# Patient Record
Sex: FEMALE | Race: WHITE | NOT HISPANIC OR LATINO | Employment: FULL TIME | URBAN - METROPOLITAN AREA
[De-identification: names, ages, dates, MRNs, and addresses within clinical notes are randomized per-mention and may not be internally consistent; named-entity substitution may affect disease eponyms.]

---

## 2018-03-08 ENCOUNTER — TRANSFERRED RECORDS (OUTPATIENT)
Dept: HEALTH INFORMATION MANAGEMENT | Facility: CLINIC | Age: 43
End: 2018-03-08

## 2018-03-15 ENCOUNTER — TRANSFERRED RECORDS (OUTPATIENT)
Dept: HEALTH INFORMATION MANAGEMENT | Facility: CLINIC | Age: 43
End: 2018-03-15

## 2018-08-08 ENCOUNTER — HEALTH MAINTENANCE LETTER (OUTPATIENT)
Age: 43
End: 2018-08-08

## 2018-08-15 ENCOUNTER — PRE VISIT (OUTPATIENT)
Dept: NEUROLOGY | Facility: CLINIC | Age: 43
End: 2018-08-15

## 2018-08-15 ENCOUNTER — OFFICE VISIT (OUTPATIENT)
Dept: NEUROLOGY | Facility: CLINIC | Age: 43
End: 2018-08-15
Attending: PSYCHIATRY & NEUROLOGY
Payer: OTHER GOVERNMENT

## 2018-08-15 VITALS
HEIGHT: 68 IN | DIASTOLIC BLOOD PRESSURE: 83 MMHG | SYSTOLIC BLOOD PRESSURE: 126 MMHG | BODY MASS INDEX: 37.28 KG/M2 | WEIGHT: 246 LBS | HEART RATE: 59 BPM

## 2018-08-15 DIAGNOSIS — G93.32 CHRONIC FATIGUE DISORDER: Chronic | ICD-10-CM

## 2018-08-15 DIAGNOSIS — G35 MS (MULTIPLE SCLEROSIS) (H): Primary | ICD-10-CM

## 2018-08-15 PROCEDURE — G0463 HOSPITAL OUTPT CLINIC VISIT: HCPCS | Mod: ZF

## 2018-08-15 RX ORDER — AMANTADINE HYDROCHLORIDE 100 MG/1
100 CAPSULE, GELATIN COATED ORAL 2 TIMES DAILY
Qty: 90 CAPSULE | Refills: 3 | Status: SHIPPED | OUTPATIENT
Start: 2018-08-15 | End: 2018-11-14

## 2018-08-15 RX ORDER — GLATIRAMER 40 MG/ML
INJECTION, SOLUTION SUBCUTANEOUS
COMMUNITY
Start: 2018-04-02 | End: 2018-11-14

## 2018-08-15 RX ORDER — ERGOCALCIFEROL 1.25 MG/1
50000 CAPSULE ORAL WEEKLY
Qty: 30 CAPSULE | Refills: 3 | Status: SHIPPED | OUTPATIENT
Start: 2018-08-15 | End: 2019-09-18

## 2018-08-15 RX ORDER — DESOGESTREL AND ETHINYL ESTRADIOL 0.15-0.03
KIT ORAL
COMMUNITY
End: 2020-01-16

## 2018-08-15 ASSESSMENT — ENCOUNTER SYMPTOMS
HALLUCINATIONS: 0
BRUISES/BLEEDS EASILY: 0
SEIZURES: 0
SWOLLEN GLANDS: 0
BOWEL INCONTINENCE: 1
RECTAL PAIN: 0
WEIGHT LOSS: 0
HEADACHES: 1
INCREASED ENERGY: 1
DIZZINESS: 1
NAUSEA: 0
WEAKNESS: 1
VOMITING: 0
ALTERED TEMPERATURE REGULATION: 0
TINGLING: 1
DECREASED APPETITE: 0
WEIGHT GAIN: 0
LOSS OF CONSCIOUSNESS: 0
BLOOD IN STOOL: 0
POLYDIPSIA: 0
FATIGUE: 1
FEVER: 0
POLYPHAGIA: 0
DIARRHEA: 1
HEARTBURN: 0
CONSTIPATION: 1
NUMBNESS: 1
ABDOMINAL PAIN: 0
MEMORY LOSS: 1
PARALYSIS: 0
NIGHT SWEATS: 1
SPEECH CHANGE: 0
CHILLS: 0
DISTURBANCES IN COORDINATION: 1
JAUNDICE: 0
TREMORS: 0
BLOATING: 0

## 2018-08-15 NOTE — LETTER
Date:August 23, 2018      Patient was self referred, no letter generated. Do not send.        [facility] Health Information

## 2018-08-15 NOTE — MR AVS SNAPSHOT
After Visit Summary   8/15/2018    Chayo Nathan    MRN: [id number]           Patient Information     Date Of Birth          1975        Visit Information        Provider Department      8/15/2018 2:00 PM Maryann Hooks MD [facility] Multiple Sclerosis        Today's Diagnoses     Chronic fatigue disorder    -  1    MS (multiple sclerosis) (H)           Follow-ups after your visit        Follow-up notes from your care team     Return in about 3 months (around 11/15/2018).      Your next 10 appointments already scheduled     Nov 14, 2018 11:00 AM CST   (Arrive by 10:45 AM)   Return Multiple Sclerosis with Maryann Hooks MD   [facility] ([facility] [place])    [address] [place] [address]  [address] [place] [id number] [phone]              Future tests that were ordered for you today     Open Future Orders        Priority Expected Expires Ordered    MRI Cervical spine w & w/o contrast Routine  8/15/2019 8/15/2018    MRI Thoracic spine w & w/o contrast Routine  8/15/2019 8/15/2018    MR Brain w/o Contrast Routine  8/15/2019 8/15/2018            Who to contact     If you have questions or need follow up information about today's clinic visit or your schedule please contact [facility] MULTIPLE SCL[facility]OSIS directly at [phone].  Normal or non-critical lab and imaging results will be communicated to you by MyChart, letter or phone within 4 business days after the clinic has received the results. If you do not hear from us within 7 days, please contact the clinic through riskmethodshart or phone. If you have a critical or abnormal lab result, we will notify you by phone as soon as possible.  Submit refill requests through [employer] or call your pharmacy and they will forward the refill request to us. Please allow 3 business days for your refill to be completed.          Additional Information About Your Visit        [employer]shar[employer] Information     [employer] gives you secure access  "to your electronic health record. If you see a primary care provider, you can also send messages to your care team and make appointments. If you have questions, please call your primary care clinic.  If you do not have a primary care provider, please call [phone] and they will assist you.        Care EveryWhere ID     This is your Care EveryWhere ID. This could be used by other organizations to access your [place] medical records  [id number]        Your Vitals Were     Pulse Height BMI (Body Mass Index)             59 1.727 m (5' 8\") 37.4 kg/m2          Blood Pressure from Last 3 Encounters:   08/15/18 126/83    Weight from Last 3 Encounters:   08/15/18 111.6 kg (246 lb)                 Today's Medication Changes          These changes are accurate as of 8/15/18  3:42 PM.  If you have any questions, ask your nurse or doctor.               Start taking these medicines.        Dose/Directions    amantadine 100 MG capsule   Commonly known as:  SYMMETREL   Used for:  Chronic fatigue disorder, MS (multiple sclerosis) (H)   Started by:  Maryann Hooks MD        Dose:  100 mg   Take 1 capsule (100 mg) by mouth 2 times daily   Quantity:  90 capsule   Refills:  3       ergocalciferol 56225 units capsule   Commonly known as:  ERGOCALCIFEROL   Used for:  MS (multiple sclerosis) (H)   Started by:  Maryann Hooks MD        Dose:  33555 Units   Take 1 capsule (50,000 Units) by mouth once a week   Quantity:  30 capsule   Refills:  3            Where to get your medicines      These medications were sent to [facility] [address] [place] [id number]     Phone:  [phone]     amantadine 100 MG capsule    ergocalciferol 52549 units capsule                Primary Care Provider Fax #    Provider Not In System [phone]                Equal Access to Services     MATEO HUGO AH: Dennis Martin, jessenia ornelas, nicolas raymundo " nikolas workmantoma emanuelpeg guzmánaarashard ah. [facility] [phone].    ATENCIÓN: Si habla gerry, tiene a duke disposición servicios gratuitos de asistencia lingüística. Larissa al [phone].    We comply with applicable federal civil rights laws and [place] laws. We do not discriminate on the basis of race, color, national origin, age, disability, sex, sexual orientation, or gender identity.            Thank you!     Thank you for choosing [facility] MULTIPLE SCLEROSIS  for your care. Our goal is always to provide you with excellent care. Hearing back from our patients is one way we can continue to improve our services. Please take a few minutes to complete the written survey that you may receive in the mail after your visit with us. Thank you!             Your Updated Medication List - Protect others around you: Learn how to safely use, store and throw away your medicines at www.disposemymeds.org.          This list is accurate as of 8/15/18  3:42 PM.  Always use your most recent med list.                   Brand Name Dispense Instructions for use Diagnosis    amantadine 100 MG capsule    SYMMETREL    90 capsule    Take 1 capsule (100 mg) by mouth 2 times daily    Chronic fatigue disorder, MS (multiple sclerosis) (H)       COPAXONE 40 MG/ML Sosy   Generic drug:  Glatiramer Acetate           DESOGEN 0.15-30 MG-MCG per tablet   Generic drug:  desogestrel-ethinyl estradiol           ergocalciferol 31115 units capsule    ERGOCALCIFEROL    30 capsule    Take 1 capsule (50,000 Units) by mouth once a week    MS (multiple sclerosis) (H)

## 2018-08-15 NOTE — LETTER
8/15/2018       RE: Chayo Nathan  [address] [facility] [address]     Dear Colleague,    Thank you for referring your patient, Chayo Nathan, to the [facility]ULT[facility]LE SCL[facility]IS at [facility]. Please see a copy of my visit note below.    THE [facility]LE [facility]CL[facility] CLINIC  NEW PATIENT EVALUATION/CONSULTATION    Referral source:   Self              PRINCIPAL NEUROLOGIC DIAGNOSIS: Multiple Sclerosis    DISEASE SUMMARY  Date of onset: 2013  Date of diagnosis of MS: 2018  Disease course at onset: Relapsing Remitting  Current disease course: Relapsing Remitting  Previous disease therapies: none  Current disease therapy: Copaxone  Most recent MRI brain: 2-18  Most recent MRI cervical spine: 2-18  CSF: OCB +  JCV serology result and date:       HISTORY OF ILLNESS:    An opinion on this 43 year old right handed genetic female  was requested by Dr. Terrell Vasquez for management. The patient was accompanied by no one.       43 YEAR OLD FEMALE WITH NO SIGNIFICANt PMH EXCEPT chronic anemia likely from gastric bypass in 2007 and menometrorrhagia. She was  In her USOH until 2013 when she developed RLE numbness that lasted months and improved some without returning to her normal baseline, it would worsen during walking, she never seeked medical care. In 2014 she developed bilateral vision loss, she thought her contacts were causing an infection, there was significant light sensitivity. She went to the ER and was told it was inflammation that got better with steroid drops. 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     On February 14 she noticed a foot drop, numbness and balance issues, that had never happened before, she was at work in the hospital, by the end of the day it was worse. She saw her PCP who ordered an MRI lumbar and thoracic who she obtained on 2-16, when the results came back she was referred to   [facility] a neurologist who ordered the brain and cervical spine and a spinal tap which was positive for OCB. She underwent a 5 day course of IV solumedrol and saw improvement after 3 days but the side effects were terrible, she could not move, muscle pains, no HTN, insomnia or psychiatric symptoms. She started Copaxone end of March. She was under a lot of stress,     She reports that she does not have any foot weakness now but has balance issues that she thought were from the leg weakness. In march she had a prolonged spasm of the R arm (biceps) that responded to baclofen.    Current Symptoms:  1. Leg numbness bilaterally (toes)   2. Burning in the legs when she gets in water  3. Fatigue        PAST HISTORY:  No past medical history on file.    No past surgical history on file.           Current Outpatient Prescriptions:  Current Outpatient Prescriptions   Medication     Glatiramer Acetate (COPAXONE) 40 MG/ML SOSY     desogestrel-ethinyl estradiol (DESOGEN) 0.15-30 MG-MCG per tablet     No current facility-administered medications for this visit.           ALLERGIES       Allergies   Allergen Reactions     Bees Anaphylaxis     Penicillin G Rash     Sulfa Drugs Rash         Social History    Social History     Social History     Marital status:      Spouse name: N/A     Number of children: N/A     Years of education: N/A     Occupational History     Not on file.     Social History Main Topics     Smoking status: Never Smoker     Smokeless tobacco: Never Used     Alcohol use Not on file     Drug use: Not on file     Sexual activity: Not on file     Other Topics Concern     Not on file     Social History Narrative     No narrative on file         FAMILY HISTORY     No family history on file.      REVIEW OF SYSTEMS:    Comprehensive review of systems otherwise was negative, including constitutional, head and neck, cardiovascular, pulmonary, gastrointestinal, endocrine, urologic, reproductive, rheumatic, hematologic,  immunologic, dermatologic, and psychiatric.    Nutritional concerns: None  Driving issues: None   Safety concerns regarding living situations and safety at home: None  Risk of falls: None  Pain: None    PHYSICAL EXAM:    Hair, skin, nails, and joints were normal. Neck was supple without Lhermitte's phenomenon.  There was no percussion tenderness over the spine.     The patient was alert and oriented to person, place, and time with normal language, attention and concentration, recent and remote memory, praxis, and intellectual function. Affect was normal. The patient did not appear depressed.    Visual acuity:  OD 20/20 OS 20/20   Correction: without    Visual fields were full to confrontation.   Pupils were 3 mm and briskly reactive OU without a relative afferent pupillary defect.  Funduscopic examination was normal without disc edema, erythema, or atrophy.  Extraocular movements: Intact without HORTENCIA  Facial sensation is normal. Normal strength of the muscles of mastication:   Muscles of facial expression were normal  Hearing was normal. Gag reflex and palatal movements were normal. Sternocleidomastoid and trapezius power were normal. Tongue movements were normal. There was no dysarthria.    Motor Examination:   There was no pronator drift.       Motor    Upper      Right Left   Shoulder Abduction 5 5   Elbow Flexion 5 5   Elbow Extension 5 5   Wrist Extension 5 5   Digit Extension 5 5   Digit Flexion 5 5   APB 5 5   Tone 0 0   Lower       Right Left   Hip Flexion 5 5   Knee Extension 5 5   Knee Flexion 5 5   Foot Dorsiflexion 5 5   Foot Plantar Flexion 5 5   EH 5 5   Toe Flexion 5 5   Tone 0 0               Reflexes:     Reflexes       Right  Left   Biceps 1  1   Triceps 1  1   Brachioradialis 1  1   Patellar  1  1   Achilles 1  1   Babinski down  up         Coordination:     Right Left   RRM Normal Normal   LYUDMILA Normal Normal   FTN Normal Normal   RRM Normal mirror movemnts Normal   HKS Normal Normal         Sensory  examination:    Light touch:  Intact in all extremities      Coordination and Gait        Gait Normal   Right Left   Romberg Normal  Heel Normal Normal   Tandem Normal  Toe Normal Normal                   QUANTITATIVE SCORES:    Visual: 0-Normal  Brainstem: 0-Normal  Pyramidal: 1-Signs only  Cerebellar: 0-Normal  Sensory: 2-mild decrease in touch or pain or position sense or moderate decrease in vibration in one or two limbs; and/or mild vibration or figure-writing or temperature decrease alone in more than two limbs  Bladder/Bowel: 0-Normal  Cerebral: 0-Normal  Ambulatory: 0-Unrestricted    EDSS: 1.5- no disability, minimal signs in more than one FS (more than one FS grade 1)            REVIEW OF OUTSIDE RECORDS:  CSF 10 OCB and + increased IgG index    REVIEW OF IMAGING STUDIES:    I personally reviewed the following images:    MRI Brain 2-18 shows only 3-4 lesions atypical for MS in size, shape and location, not enhancing.  MRI cervical spine 2-18 multiple small lesions right, left and ventral, one of them (L) with partial enhancement.  MRI of the thoracic spine also shows multiple lesions, no enhancements     ASSESSMENT:    RRMS predominantly affecting the spinal cord, NMOSD can't be role out given the paucity of brain lesions and  Predominance of spinal cord lesons but smaller but I will review her records and make sure NMO antibody was ordered.    Her records show that her CSF is supportive of the fiagnosis of MS and not NMO     PLAN:    Continue CA for now. Repeat MRIs in November 8 months from starting Copaxone to confirm disease stability from the radiological stand point. She was advised to use [facility] magnet strength and facility for better comparison. She will be started on Amantadine for fatigue, 100 mg po BID    Finally I will follow the patient up in 3 month(s) as long as the patient is doing well. I instructed the patient to call or mychart my office with any concerns or questions.    I spent 75  minutes in this visit, with >50% direct patient time spent counseling about prognosis, treatment options, and coordination of care. She was counseled on the importance of diet, exercise, taking care of herself, decreasing stress and anxiety.    My recommendations will be communicated back to the patient's physician(s) by mail.  Follow-up is expected to be with me.      Maryann Hooks MD  Chief, Multiple Sclerosis Division  Department of Neurology  [facility]      (Chart documentation was completed in part with Dragon voice-recognition software. Even though reviewed, some grammatical, spelling, and word errors may remain.)     Answers for HPI/ROS submitted by the patient on 8/15/2018   General Symptoms: Yes  Skin Symptoms: No  HENT Symptoms: No  EYE SYMPTOMS: No  HEART SYMPTOMS: No  LUNG SYMPTOMS: No  INTESTINAL SYMPTOMS: Yes  URINARY SYMPTOMS: No  GYNECOLOGIC SYMPTOMS: No  BREAST SYMPTOMS: No  SKELETAL SYMPTOMS: No  BLOOD SYMPTOMS: Yes  NERVOUS SYSTEM SYMPTOMS: Yes  MENTAL HEALTH SYMPTOMS: No  Fever: No  Loss of appetite: No  Weight loss: No  Weight gain: No  Fatigue: Yes  Night sweats: Yes  Chills: No  Increased stress: Yes  Excessive hunger: No  Excessive thirst: No  Feeling hot or cold when others believe the temperature is normal: No  Loss of height: No  Post-operative complications: No  Surgical site pain: No  Hallucinations: No  Change in or Loss of Energy: Yes  Hyperactivity: No  Confusion: Yes  Heart burn or indigestion: No  Nausea: No  Vomiting: No  Abdominal pain: No  Bloating: No  Constipation: Yes  Diarrhea: Yes  Blood in stool: No  Black stools: No  Rectal or Anal pain: No  Fecal incontinence: Yes  Yellowing of skin or eyes: No  Vomit with blood: No  Change in stools: No  Anemia: Yes  Swollen glands: No  Easy bleeding or bruising: No  Edema or swelling: Yes  Trouble with coordination: Yes  Dizziness or trouble with balance: Yes  Fainting or black-out spells:  No  Memory loss: Yes  Headache: Yes  Seizures: No  Speech problems: No  Tingling: Yes  Tremor: No  Weakness: Yes  Difficulty walking: Yes  Paralysis: No  Numbness: Yes      Again, thank you for allowing me to participate in the care of your patient.      Sincerely,    Maryann Hooks MD

## 2018-08-15 NOTE — PROGRESS NOTES
THE [facility] MULTIPLE SCLEROSIS CLINIC  NEW PATIENT EVALUATION/CONSULTATION    Referral source:   Self              PRINCIPAL NEUROLOGIC DIAGNOSIS: Multiple Sclerosis    DISEASE SUMMARY  Date of onset: 2013  Date of diagnosis of MS: 2018  Disease course at onset: Relapsing Remitting  Current disease course: Relapsing Remitting  Previous disease therapies: none  Current disease therapy: Copaxone  Most recent MRI brain: 2-18  Most recent MRI cervical spine: 2-18  CSF: OCB +  JCV serology result and date:       HISTORY OF ILLNESS:    An opinion on this 43 year old right handed genetic female  was requested by Dr. Terrell Vasquez for management. The patient was accompanied by no one.       43 YEAR OLD FEMALE WITH NO SIGNIFICANt PMH EXCEPT chronic anemia likely from gastric bypass in 2007 and menometrorrhagia. She was  In her [employer] until 2013 when she developed RLE numbness that lasted months and improved some without returning to her normal baseline, it would worsen during walking, she never seeked medical care. In 2014 she developed bilateral vision loss, she thought her contacts were causing an infection, there was significant light sensitivity. She went to the ER and was told it was inflammation that got better with steroid drops. 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     On February 14 she noticed a foot drop, numbness and balance issues, that had never happened before, she was at work in the hospital, by the end of the day it was worse. She saw her PCP who ordered an MRI lumbar and thoracic who she obtained on 2-16, when the results came back she was referred to Dr.Tran Mensah a neurologist who ordered the brain and cervical spine and a spinal tap which was positive for OCB. She underwent a 5 day course of IV solumedrol and saw improvement after 3 days but the side effects were terrible, she could not move, muscle pains, no HTN, insomnia or psychiatric  symptoms. She started Copaxone end of March. She was under a lot of stress,     She reports that she does not have any foot weakness now but has balance issues that she thought were from the leg weakness. In march she had a prolonged spasm of the R arm (biceps) that responded to baclofen.    Current Symptoms:  1. Leg numbness bilaterally (toes)   2. Burning in the legs when she gets in water  3. Fatigue        PAST HISTORY:  No past medical history on file.    No past surgical history on file.           Current Outpatient Prescriptions:  Current Outpatient Prescriptions   Medication     Glatiramer Acetate (COPAXONE) 40 MG/ML SOSY     desogestrel-ethinyl estradiol (DESOGEN) 0.15-30 MG-MCG per tablet     No current facility-administered medications for this visit.           ALLERGIES       Allergies   Allergen Reactions     Bees Anaphylaxis     Penicillin G Rash     Sulfa Drugs Rash         Social History    Social History     Social History     Marital status:      Spouse name: N/A     Number of children: N/A     Years of education: N/A     Occupational History     Not on file.     Social History Main Topics     Smoking status: Never Smoker     Smokeless tobacco: Never Used     Alcohol use Not on file     Drug use: Not on file     Sexual activity: Not on file     Other Topics Concern     Not on file     Social History Narrative     No narrative on file         FAMILY HISTORY     No family history on file.      REVIEW OF SYSTEMS:    Comprehensive review of systems otherwise was negative, including constitutional, head and neck, cardiovascular, pulmonary, gastrointestinal, endocrine, urologic, reproductive, rheumatic, hematologic, immunologic, dermatologic, and psychiatric.    Nutritional concerns: None  Driving issues: None   Safety concerns regarding living situations and safety at home: None  Risk of falls: None  Pain: None    PHYSICAL EXAM:    Hair, skin, nails, and joints were normal. Neck was supple  without Lhermitte's phenomenon.  There was no percussion tenderness over the spine.     The patient was alert and oriented to person, place, and time with normal language, attention and concentration, recent and remote memory, praxis, and intellectual function. Affect was normal. The patient did not appear depressed.    Visual acuity:  OD 20/20 OS 20/20   Correction: without    Visual fields were full to confrontation.   Pupils were 3 mm and briskly reactive OU without a relative afferent pupillary defect.  Funduscopic examination was normal without disc edema, erythema, or atrophy.  Extraocular movements: Intact without HORTENCIA  Facial sensation is normal. Normal strength of the muscles of mastication:   Muscles of facial expression were normal  Hearing was normal. Gag reflex and palatal movements were normal. Sternocleidomastoid and trapezius power were normal. Tongue movements were normal. There was no dysarthria.    Motor Examination:   There was no pronator drift.       Motor    Upper      Right Left   Shoulder Abduction 5 5   Elbow Flexion 5 5   Elbow Extension 5 5   Wrist Extension 5 5   Digit Extension 5 5   Digit Flexion 5 5   APB 5 5   Tone 0 0   Lower       Right Left   Hip Flexion 5 5   Knee Extension 5 5   Knee Flexion 5 5   Foot Dorsiflexion 5 5   Foot Plantar Flexion 5 5   EH 5 5   Toe Flexion 5 5   Tone 0 0               Reflexes:     Reflexes       Right  Left   Biceps 1  1   Triceps 1  1   Brachioradialis 1  1   Patellar  1  1   Achilles 1  1   Babinski down  up         Coordination:     Right Left   RRM Normal Normal   LYUDMILA Normal Normal   FTN Normal Normal   RRM Normal mirror movemnts Normal   HKS Normal Normal         Sensory examination:    Light touch:  Intact in all extremities      Coordination and Gait        Gait Normal   Right Left   Romberg Normal  Heel Normal Normal   Tandem Normal  Toe Normal Normal                   QUANTITATIVE SCORES:    Visual: 0-Normal  Brainstem: 0-Normal  Pyramidal:  1-Signs only  Cerebellar: 0-Normal  Sensory: 2-mild decrease in touch or pain or position sense or moderate decrease in vibration in one or two limbs; and/or mild vibration or figure-writing or temperature decrease alone in more than two limbs  Bladder/Bowel: 0-Normal  Cerebral: 0-Normal  Ambulatory: 0-Unrestricted    EDSS: 1.5- no disability, minimal signs in more than one FS (more than one FS grade 1)            REVIEW OF OUTSIDE RECORDS:  CSF 10 OCB and + increased IgG index    REVIEW OF IMAGING STUDIES:    I personally reviewed the following images:    MRI Brain 2-18 shows only 3-4 lesions atypical for MS in size, shape and location, not enhancing.  MRI cervical spine 2-18 multiple small lesions right, left and ventral, one of them (L) with partial enhancement.  MRI of the thoracic spine also shows multiple lesions, no enhancements     ASSESSMENT:    RRMS predominantly affecting the spinal cord, NMOSD can't be role out given the paucity of brain lesions and  Predominance of spinal cord lesons but smaller but I will review her records and make sure NMO antibody was ordered.    Her records show that her CSF is supportive of the fiagnosis of MS and not NMO     PLAN:    Continue CA for now. Repeat MRIs in November 8 months from starting Copaxone to confirm disease stability from the radiological stand point. She was advised to use [facility] magnet strength and facility for better comparison. She will be started on Amantadine for fatigue, 100 mg po BID    Finally I will follow the patient up in 3 month(s) as long as the patient is doing well. I instructed the patient to call or mychart my office with any concerns or questions.    I spent 75 minutes in this visit, with >50% direct patient time spent counseling about prognosis, treatment options, and coordination of care. She was counseled on the importance of diet, exercise, taking care of herself, decreasing stress and anxiety.    My recommendations will be communicated  back to the patient's physician(s) by mail.  Follow-up is expected to be with me.      Maryann Hooks MD  Chief, Multiple Sclerosis Division  Department of Neurology  [facility]      (Chart documentation was completed in part with Dragon voice-recognition software. Even though reviewed, some grammatical, spelling, and word errors may remain.)     Answers for HPI/ROS submitted by the patient on 8/15/2018   General Symptoms: Yes  Skin Symptoms: No  HENT Symptoms: No  EYE SYMPTOMS: No  HEART SYMPTOMS: No  LUNG SYMPTOMS: No  INTESTINAL SYMPTOMS: Yes  URINARY SYMPTOMS: No  GYNECOLOGIC SYMPTOMS: No  BREAST SYMPTOMS: No  SKELETAL SYMPTOMS: No  BLOOD SYMPTOMS: Yes  NERVOUS SYSTEM SYMPTOMS: Yes  MENTAL HEALTH SYMPTOMS: No  Fever: No  Loss of appetite: No  Weight loss: No  Weight gain: No  Fatigue: Yes  Night sweats: Yes  Chills: No  Increased stress: Yes  Excessive hunger: No  Excessive thirst: No  Feeling hot or cold when others believe the temperature is normal: No  Loss of height: No  Post-operative complications: No  Surgical site pain: No  Hallucinations: No  Change in or Loss of Energy: Yes  Hyperactivity: No  Confusion: Yes  Heart burn or indigestion: No  Nausea: No  Vomiting: No  Abdominal pain: No  Bloating: No  Constipation: Yes  Diarrhea: Yes  Blood in stool: No  Black stools: No  Rectal or Anal pain: No  Fecal incontinence: Yes  Yellowing of skin or eyes: No  Vomit with blood: No  Change in stools: No  Anemia: Yes  Swollen glands: No  Easy bleeding or bruising: No  Edema or swelling: Yes  Trouble with coordination: Yes  Dizziness or trouble with balance: Yes  Fainting or black-out spells: No  Memory loss: Yes  Headache: Yes  Seizures: No  Speech problems: No  Tingling: Yes  Tremor: No  Weakness: Yes  Difficulty walking: Yes  Paralysis: No  Numbness: Yes

## 2018-10-19 ENCOUNTER — TRANSFERRED RECORDS (OUTPATIENT)
Dept: HEALTH INFORMATION MANAGEMENT | Facility: CLINIC | Age: 43
End: 2018-10-19

## 2018-11-14 ENCOUNTER — OFFICE VISIT (OUTPATIENT)
Dept: NEUROLOGY | Facility: CLINIC | Age: 43
End: 2018-11-14
Attending: PSYCHIATRY & NEUROLOGY
Payer: OTHER GOVERNMENT

## 2018-11-14 VITALS
HEIGHT: 68 IN | HEART RATE: 60 BPM | DIASTOLIC BLOOD PRESSURE: 79 MMHG | WEIGHT: 239.1 LBS | SYSTOLIC BLOOD PRESSURE: 118 MMHG | BODY MASS INDEX: 36.24 KG/M2

## 2018-11-14 DIAGNOSIS — G35 MULTIPLE SCLEROSIS (H): Primary | ICD-10-CM

## 2018-11-14 DIAGNOSIS — G35 MS (MULTIPLE SCLEROSIS) (H): ICD-10-CM

## 2018-11-14 DIAGNOSIS — G93.32 CHRONIC FATIGUE DISORDER: Chronic | ICD-10-CM

## 2018-11-14 PROCEDURE — G0463 HOSPITAL OUTPT CLINIC VISIT: HCPCS | Mod: ZF

## 2018-11-14 RX ORDER — GLATIRAMER 40 MG/ML
40 INJECTION, SOLUTION SUBCUTANEOUS
Qty: 12 SYRINGE | Refills: 11 | Status: SHIPPED | OUTPATIENT
Start: 2018-11-14 | End: 2019-10-04

## 2018-11-14 RX ORDER — AMANTADINE HYDROCHLORIDE 100 MG/1
200 CAPSULE, GELATIN COATED ORAL 2 TIMES DAILY
Qty: 120 CAPSULE | Refills: 3 | Status: SHIPPED | OUTPATIENT
Start: 2018-11-14 | End: 2020-01-16

## 2018-11-14 ASSESSMENT — PAIN SCALES - GENERAL: PAINLEVEL: NO PAIN (0)

## 2018-11-14 NOTE — LETTER
11/14/2018       RE: Chayo Nathan  [address] [place] [address]     Dear Colleague,    Thank you for referring your patient, Chayo Nathan, to the [facility] MULTIPLE SCLEROSIS at [facility]. Please see a copy of my visit note below.    THE [facility] MULTIPLE SCLEROSIS CLINIC  FOLLOW UP VISIT           PRINCIPAL NEUROLOGIC DIAGNOSIS: Multiple Sclerosis          HISTORY OF ILLNESS:    This is a follow visit for this 43 year old right handed genetic male  With a history of MS. Who was last seen on  8-18.   At that time the patient was recommended to start amantadine which she did and feel like it helps but she is still tired so I recommended increasing the dose to 200 mg BID. Since last visit she underwent an MRI of the brain, cervical and thoracic. She complains of her legs getting tired easily but can walk 1/2 mile every morning with her new puppy and does not feel tired, or feels like like her legs get tired.    Current Symptoms:  1. Tingling in her feet.  2. Bilateral leg weakness on exertion  3.              Current Outpatient Prescriptions:  Current Outpatient Prescriptions   Medication     amantadine (SYMMETREL) 100 MG capsule     desogestrel-ethinyl estradiol (DESOGEN) 0.15-30 MG-MCG per tablet     ergocalciferol (ERGOCALCIFEROL) 82238 units capsule     Glatiramer Acetate (COPAXONE) 40 MG/ML SOSY     No current facility-administered medications for this visit.           ALLERGIES       Allergies   Allergen Reactions     Bees Anaphylaxis     Ferumoxytol Difficulty breathing     Penicillin G Rash     Sulfa Drugs Rash           REVIEW OF SYSTEMS:    Comprehensive review of systems otherwise was negative, including constitutional, head and neck, cardiovascular, pulmonary, gastrointestinal, endocrine, urologic, reproductive, rheumatic, hematologic, immunologic, dermatologic, and psychiatric.    Nutritional concerns: None  Driving issues: None    Safety concerns regarding living situations and safety at home: None  Risk of falls: None  Pain: None    PHYSICAL EXAM:    PHYSICAL EXAM:     Hair, skin, nails, and joints were normal. Neck was supple without Lhermitte's phenomenon.  There was no percussion tenderness over the spine.      The patient was alert and oriented to person, place, and time with normal language, attention and concentration, recent and remote memory, praxis, and intellectual function. Affect was normal. The patient did not appear depressed.     Visual acuity:  OD 20/20 OS 20/20   Correction: without     Visual fields were full to confrontation.   Pupils were 3 mm and briskly reactive OU without a relative afferent pupillary defect.  Funduscopic examination was normal without disc edema, erythema, or atrophy.  Extraocular movements: Intact without HORTENCIA  Facial sensation is normal. Normal strength of the muscles of mastication:   Muscles of facial expression were normal  Hearing was normal. Gag reflex and palatal movements were normal. Sternocleidomastoid and trapezius power were normal. Tongue movements were normal. There was no dysarthria.     Motor Examination:   There was no pronator drift.         Motor     Upper         Right Left   Shoulder Abduction 5 5   Elbow Flexion 5 5   Elbow Extension 5 5   Wrist Extension 5 5   Digit Extension 5 5   Digit Flexion 5 5   APB 5 5   Tone 0 0   Lower          Right Left   Hip Flexion 5 5   Knee Extension 5 5   Knee Flexion 5 5   Foot Dorsiflexion 5 5   Foot Plantar Flexion 5 5   EH 5 5   Toe Flexion 5 5   Tone 0 0                    Reflexes:                Reflexes           Right   Left   Biceps 1   1   Triceps 1   1   Brachioradialis 1   1   Patellar  1   1   Achilles 1   1   Babinski down   up            Coordination:       Right Left   RRM Normal Normal   LYUDMILA Normal Normal   FTN Normal Normal   RRM Normal mirror movemnts Normal   HKS Normal Normal            Sensory examination:     Light  touch:  Intact in all extremities        Coordination and Gait           Gait Normal     Right Left   Romberg Normal   Heel Normal Normal   Tandem Normal   Toe Normal Normal                           QUANTITATIVE SCORES:     Visual: 0-Normal  Brainstem: 0-Normal  Pyramidal: 1-Signs only  Cerebellar: 0-Normal  Sensory: 2-mild decrease in touch or pain or position sense or moderate decrease in vibration in one or two limbs; and/or mild vibration or figure-writing or temperature decrease alone in more than two limbs  Bladder/Bowel: 0-Normal  Cerebral: 0-Normal  Ambulatory: 0-Unrestricted     EDSS: 1.5- no disability, minimal signs in more than one FS (more than one FS grade 1)          REVIEW OF OUTSIDE RECORDS:    NMO antibody is negative, complete work up for MS mimics is negative, vitamin levels and zinc are normal, CSF OCB are positive (2).    REVIEW OF IMAGING STUDIES:    I personally reviewed the following images:    MRI of the brain from 10-18 shows 8-10 lesions some typical of MS (PV) some not, no enhancements, unchanged from last MRI from 2-18,    MRI of the C and T spine also show no interval change.    ASSESSMENT:    RRMS clinically and radiologically stable on Copaxone. Fatigue and issue.    PLAN:    Continue Copaxone indefinitely, repeat MRI B, C and T spine in 6 [facility].  Increase Amantadine to 200 mg po BID before breakfast and lunch.  Continue exercise program.    Finally I will follow the patient up in 6 month(s) as long as the patient is doing well. I instructed the patient to call or mychart my office with any concerns or questions.    I spent 45 minutes in this visit, with >50% direct patient time spent counseling about prognosis, treatment options, and coordination of care.     My recommendations will be communicated back to the patient's physician(s) by mail.  Follow-up is expected to be with me.      Maryann Hooks MD  Chief, Multiple Sclerosis Division  Department of  Neurology  [facility]      (Chart documentation was completed in part with Dragon voice-recognition software. Even though reviewed, some grammatical, spelling, and word errors may remain.)

## 2018-11-14 NOTE — PROGRESS NOTES
THE [facility] MULTIPLE SCLEROSIS CLINIC  FOLLOW UP VISIT           PRINCIPAL NEUROLOGIC DIAGNOSIS: Multiple Sclerosis          HISTORY OF ILLNESS:    This is a follow visit for this 43 year old right handed genetic male  With a history of MS. Who was last seen on  8-18.   At that time the patient was recommended to start amantadine which she did and feel like it helps but she is still tired so I recommended increasing the dose to 200 mg BID. Since last visit she underwent an MRI of the brain, cervical and thoracic. She complains of her legs getting tired easily but can walk 1/2 mile every morning with her new puppy and does not feel tired, or feels like like her legs get tired.    Current Symptoms:  1. Tingling in her feet.  2. Bilateral leg weakness on exertion  3.              Current Outpatient Prescriptions:  Current Outpatient Prescriptions   Medication     amantadine (SYMMETREL) 100 MG capsule     desogestrel-ethinyl estradiol (DESOGEN) 0.15-30 MG-MCG per tablet     ergocalciferol (ERGOCALCIFEROL) 45036 units capsule     Glatiramer Acetate (COPAXONE) 40 MG/ML SOSY     No current facility-administered medications for this visit.           ALLERGIES       Allergies   Allergen Reactions     Bees Anaphylaxis     Ferumoxytol Difficulty breathing     Penicillin G Rash     Sulfa Drugs Rash           REVIEW OF SYSTEMS:    Comprehensive review of systems otherwise was negative, including constitutional, head and neck, cardiovascular, pulmonary, gastrointestinal, endocrine, urologic, reproductive, rheumatic, hematologic, immunologic, dermatologic, and psychiatric.    Nutritional concerns: None  Driving issues: None   Safety concerns regarding living situations and safety at home: None  Risk of falls: None  Pain: None    PHYSICAL EXAM:    PHYSICAL EXAM:     Hair, skin, nails, and joints were normal. Neck was supple without Lhermitte's phenomenon.  There was no percussion tenderness over the spine.       The patient was alert and oriented to person, place, and time with normal language, attention and concentration, recent and remote memory, praxis, and intellectual function. Affect was normal. The patient did not appear depressed.     Visual acuity:  OD 20/20 OS 20/20   Correction: without     Visual fields were full to confrontation.   Pupils were 3 mm and briskly reactive OU without a relative afferent pupillary defect.  Funduscopic examination was normal without disc edema, erythema, or atrophy.  Extraocular movements: Intact without HORTENCIA  Facial sensation is normal. Normal strength of the muscles of mastication:   Muscles of facial expression were normal  Hearing was normal. Gag reflex and palatal movements were normal. Sternocleidomastoid and trapezius power were normal. Tongue movements were normal. There was no dysarthria.     Motor Examination:   There was no pronator drift.         Motor     Upper         Right Left   Shoulder Abduction 5 5   Elbow Flexion 5 5   Elbow Extension 5 5   Wrist Extension 5 5   Digit Extension 5 5   Digit Flexion 5 5   APB 5 5   Tone 0 0   Lower          Right Left   Hip Flexion 5 5   Knee Extension 5 5   Knee Flexion 5 5   Foot Dorsiflexion 5 5   Foot Plantar Flexion 5 5   EH 5 5   Toe Flexion 5 5   Tone 0 0                    Reflexes:                Reflexes           Right   Left   Biceps 1   1   Triceps 1   1   Brachioradialis 1   1   Patellar  1   1   Achilles 1   1   Babinski down   up            Coordination:       Right Left   RRM Normal Normal   LYUDMILA Normal Normal   FTN Normal Normal   RRM Normal mirror movemnts Normal   HKS Normal Normal            Sensory examination:     Light touch:  Intact in all extremities        Coordination and Gait           Gait Normal     Right Left   Romberg Normal   Heel Normal Normal   Tandem Normal   Toe Normal Normal                           QUANTITATIVE SCORES:     Visual: 0-Normal  Brainstem: 0-Normal  Pyramidal: 1-Signs  only  Cerebellar: 0-Normal  Sensory: 2-mild decrease in touch or pain or position sense or moderate decrease in vibration in one or two limbs; and/or mild vibration or figure-writing or temperature decrease alone in more than two limbs  Bladder/Bowel: 0-Normal  Cerebral: 0-Normal  Ambulatory: 0-Unrestricted     EDSS: 1.5- no disability, minimal signs in more than one FS (more than one FS grade 1)          REVIEW OF OUTSIDE RECORDS:    NMO antibody is negative, complete work up for MS mimics is negative, vitamin levels and zinc are normal, CSF OCB are positive (2).    REVIEW OF IMAGING STUDIES:    I personally reviewed the following images:    MRI of the brain from 10-18 shows 8-10 lesions some typical of MS (PV) some not, no enhancements, unchanged from last MRI from 2-18,    MRI of the C and T spine also show no interval change.    ASSESSMENT:    RRMS clinically and radiologically stable on Copaxone. Fatigue and issue.    PLAN:    Continue Copaxone indefinitely, repeat MRI B, C and T spine in 6 months at the [facility].  Increase Amantadine to 200 mg po BID before breakfast and lunch.  Continue exercise program.    Finally I will follow the patient up in 6 month(s) as long as the patient is doing well. I instructed the patient to call or mychart my office with any concerns or questions.    I spent 45 minutes in this visit, with >50% direct patient time spent counseling about prognosis, treatment options, and coordination of care.     My recommendations will be communicated back to the patient's physician(s) by mail.  Follow-up is expected to be with me.      Maryann Hooks MD  Chief, Multiple Sclerosis Division  Department of Neurology  [facility]      (Chart documentation was completed in part with Dragon voice-recognition software. Even though reviewed, some grammatical, spelling, and word errors may remain.)

## 2019-05-08 ENCOUNTER — ANCILLARY PROCEDURE (OUTPATIENT)
Dept: MRI IMAGING | Facility: CLINIC | Age: 44
End: 2019-05-08
Attending: PSYCHIATRY & NEUROLOGY
Payer: OTHER GOVERNMENT

## 2019-05-08 DIAGNOSIS — G35 MS (MULTIPLE SCLEROSIS) (H): ICD-10-CM

## 2019-05-08 RX ORDER — GADOBUTROL 604.72 MG/ML
10 INJECTION INTRAVENOUS ONCE
Status: COMPLETED | OUTPATIENT
Start: 2019-05-08 | End: 2019-05-08

## 2019-05-08 RX ADMIN — GADOBUTROL 10 ML: 604.72 INJECTION INTRAVENOUS at 10:54

## 2019-05-08 NOTE — DISCHARGE INSTRUCTIONS
MRI Contrast Discharge Instructions    The IV contrast you received today will pass out of your body in your  urine. This will happen in the next 24 hours. You will not feel this process.  Your urine will not change color.    Drink at least 4 extra glasses of water or juice today (unless your doctor  has restricted your fluids). This reduces the stress on your kidneys.  You may take your regular medicines.    If you are on dialysis: It is best to have dialysis today.    If you have a reaction: Most reactions happen right away. If you have  any new symptoms after leaving the hospital (such as hives or swelling),  call your hospital at the correct number below. Or call your family doctor.  If you have breathing distress or wheezing, call 911.    Special instructions: ***    I have read and understand the above information.    Signature:______________________________________ Date:___________    Staff:__________________________________________ Date:___________     Time:__________    [place] [facility] Departments:    ___[place]: [phone]  ___[facility]: [phone]  ___[place]: [phone] ___[facility]: [phone]  ___[facility]: [phone]  ___[facility]: [phone]  ___[phone]  ___[facility]: [phone]  ___[phone]

## 2019-05-08 NOTE — DISCHARGE INSTRUCTIONS
MRI Contrast Discharge Instructions    The IV contrast you received today will pass out of your body in your  urine. This will happen in the next 24 hours. You will not feel this process.  Your urine will not change color.    Drink at least 4 extra glasses of water or juice today (unless your doctor  has restricted your fluids). This reduces the stress on your kidneys.  You may take your regular medicines.    If you are on dialysis: It is best to have dialysis today.    If you have a reaction: Most reactions happen right away. If you have  any new symptoms after leaving the hospital (such as hives or swelling),  call your hospital at the correct number below. Or call your family doctor.  If you have breathing distress or wheezing, call 911.    Special instructions: ***    I have read and understand the above information.    Signature:______________________________________ Date:___________    Staff:__________________________________________ Date:___________     Time:__________    [facility] Departments:    ___[place]: [phone]  ___[facility]: [phone]  ___[place]: [phone] ___[facility]: [phone]  ___[facility]: [phone]  ___[facility]: [phone]  ___[phone]  ___[facility]: [phone]  ___[phone]

## 2019-05-15 ENCOUNTER — OFFICE VISIT (OUTPATIENT)
Dept: NEUROLOGY | Facility: CLINIC | Age: 44
End: 2019-05-15
Attending: PSYCHIATRY & NEUROLOGY
Payer: OTHER GOVERNMENT

## 2019-05-15 VITALS
DIASTOLIC BLOOD PRESSURE: 80 MMHG | BODY MASS INDEX: 38.07 KG/M2 | HEART RATE: 80 BPM | HEIGHT: 68 IN | WEIGHT: 251.2 LBS | SYSTOLIC BLOOD PRESSURE: 125 MMHG

## 2019-05-15 DIAGNOSIS — G35 MULTIPLE SCLEROSIS (H): Primary | ICD-10-CM

## 2019-05-15 PROCEDURE — G0463 HOSPITAL OUTPT CLINIC VISIT: HCPCS | Mod: ZF

## 2019-05-15 ASSESSMENT — MIFFLIN-ST. JEOR: SCORE: 1842.94

## 2019-05-15 ASSESSMENT — PAIN SCALES - GENERAL: PAINLEVEL: NO PAIN (0)

## 2019-05-15 NOTE — LETTER
"5/15/2019       RE: Chayo Nathan  [address] [facility] [address]     Dear Colleague,    Thank you for referring your patient, Chayo Nathan, to the [facility] MULTIPLE SCLEROSIS at [facility]. Please see a copy of my visit note below.    THE [facility] MULTIPLE SCLEROSIS CLINIC  FOLLOW UP VISIT       PRINCIPAL NEUROLOGIC DIAGNOSIS: Multiple Sclerosis      HISTORY OF ILLNESS:    This is a follow visit for this 44 year old right handed genetic female  With a history of MS. Who was last seen on  11-18.   At that time the patient was recommended to continue Copaxone. Since last visit she developed R flank numbness a few weeks ago but 2 weeks ago it seemed more intense and extensive. About a week ago she started getting \"zaps\" from the foot to the mild calf and then her foot feels week, this happened twice during a 30 min walk with her dogs, during the last 15 min, the foot feels heavier but she does not drag. 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 Numbness in her R calf and foot has been present for some time and stable but the weakness is new. Her L foot is also always numb but she thinks is worse. R hand weakness mostly tip of the fingers x 2 weeks also new. She started 0.5 ml BID of CBD oil from [employer] for spasticity and she thinks it helps. She feels stiff one she has been sitting for a while, mostly her legs, this has been going on for a while, she is now back on baclofen.    Current Symptoms:  1. R foot weaknss  2. R finger numbness  3. Spasticity      Current Outpatient Prescriptions:  Current Outpatient Medications   Medication     amantadine (SYMMETREL) 100 MG capsule     desogestrel-ethinyl estradiol (DESOGEN) 0.15-30 MG-MCG per tablet     Glatiramer Acetate (COPAXONE) 40 MG/ML SOSY     ergocalciferol (ERGOCALCIFEROL) 35536 units capsule     No current " facility-administered medications for this visit.        ALLERGIES    Allergies   Allergen Reactions     Bees Anaphylaxis     Ferumoxytol Difficulty breathing     Penicillin G Rash     Sulfa Drugs Rash       REVIEW OF SYSTEMS:    Comprehensive review of systems otherwise was negative, including constitutional, head and neck, cardiovascular, pulmonary, gastrointestinal, endocrine, urologic, reproductive, rheumatic, hematologic, immunologic, dermatologic, and psychiatric.    Nutritional concerns: None  Driving issues: None   Safety concerns regarding living situations and safety at home: None  Risk of falls: None  Pain: None    PHYSICAL EXAM:     Hair, skin, nails, and joints were normal. Neck was supple without Lhermitte's phenomenon.  There was no percussion tenderness over the spine.      The patient was alert and oriented to person, place, and time with normal language, attention and concentration, recent and remote memory, praxis, and intellectual function. Affect was normal. The patient did not appear depressed.     Visual acuity:  OD 20/20 OS 20/20   Correction: without     Visual fields were full to confrontation.   Pupils were 3 mm and briskly reactive OU without a relative afferent pupillary defect.  Funduscopic examination was normal without disc edema, erythema, or atrophy.  Extraocular movements: Intact without HORTENCIA  Facial sensation is normal. Normal strength of the muscles of mastication:   Muscles of facial expression were normal  Hearing was normal. Gag reflex and palatal movements were normal. Sternocleidomastoid and trapezius power were normal. Tongue movements were normal. There was no dysarthria.     Motor Examination:   There was no pronator drift.         Motor     Upper         Right Left   Shoulder Abduction 5 5   Elbow Flexion 5 5   Elbow Extension 5 5   Wrist Extension 5 5   Digit Extension 5 5   Digit Flexion 5 5   APB 5 5   Tone 0 0   Lower          Right Left   Hip Flexion 5 5   Knee  Extension 5 5   Knee Flexion 5 5   Foot Dorsiflexion 5 5   Foot Plantar Flexion 5 5   EH 5 5   Toe Flexion 5 5   Tone 0 0             Reflexes:                Reflexes           Right   Left   Biceps 1   1   Triceps 1   1   Brachioradialis 1   1   Patellar  1   1   Achilles 1   1   Babinski down   up            Coordination:       Right Left   RRM Normal Normal   LYUDMILA Normal Normal   FTN Normal Normal   RRM Normal mirror movemnts Normal   HKS Normal Normal         Sensory examination:     Light touch:  Intact in all extremities        Coordination and Gait     Gait Normal     Right Left   Romberg Normal   Heel Normal Normal   Tandem Normal   Toe Normal Normal         QUANTITATIVE SCORES:     Visual: 0-Normal  Brainstem: 0-Normal  Pyramidal: 1-Signs only  Cerebellar: 0-Normal  Sensory: 2-mild decrease in touch or pain or position sense or moderate decrease in vibration in one or two limbs; and/or mild vibration or figure-writing or temperature decrease alone in more than two limbs  Bladder/Bowel: 0-Normal  Cerebral: 0-Normal  Ambulatory: 0-Unrestricted     EDSS: 1.5- no disability, minimal signs in more than one FS (more than one FS grade 1)    REVIEW OF IMAGING STUDIES:    I personally reviewed the following images:    MRI B, C and T spine from 5-8-19 were compared to 10-18 and show no interval change in size or number of lesions nor enhacements    ASSESSMENT:    RRMS clinically and radiologically stable on Copaxone.      PLAN:     Continue Copaxone indefinitely, start Ampyra or 4 AP to improve ambulation capacity and decrease exertional R foot drop  Continue exercise program.    Finally I will follow the patient up in 6 month(s) as long as the patient is doing well. I instructed the patient to call or mychart my office with any concerns or questions.  I spent 45 minutes in this visit, with >50% direct patient time spent reviewing and comparing images, counseling about prognosis, treatment options, and coordination  of care.     My recommendations will be communicated back to the patient's physician(s) by mail.  Follow-up is expected to be with me.    Maryann Hooks MD  Chief, Multiple Sclerosis Division  Department of Neurology  [facility] [place]    (Chart documentation was completed in part with Dragon voice-recognition software. Even though reviewed, some grammatical, spelling, and word errors may remain.)

## 2019-05-15 NOTE — PROGRESS NOTES
"THE [facility] MULTIPLE SCLEROSIS CLINIC  FOLLOW UP VISIT           PRINCIPAL NEUROLOGIC DIAGNOSIS: Multiple Sclerosis        HISTORY OF ILLNESS:    This is a follow visit for this 44 year old right handed genetic female  With a history of MS. Who was last seen on  11-18.   At that time the patient was recommended to continue Copaxone. Since last visit she developed R flank numbness a few weeks ago but 2 weeks ago it seemed more intense and extensive. About a week ago she started getting \"zaps\" from the foot to the mild calf and then her foot feels week, this happened twice during a 30 min walk with her dogs, during the last 15 min, the foot feels heavier but she does not drag. 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 Numbness in her R calf and foot has been present for some time and stable but the weakness is new. Her L foot is also always numb but she thinks is worse. R hand weakness mostly tip of the fingers x 2 weeks also new. She started 0.5 ml BID of CBD oil from [employer] for spasticity and she thinks it helps. She feels stiff one she has been sitting for a while, mostly her legs, this has been going on for a while, she is now back on baclofen.    Current Symptoms:  1. R foot weaknss  2. R finger numbness  3. Spasticity      Current Outpatient Prescriptions:  Current Outpatient Medications   Medication     amantadine (SYMMETREL) 100 MG capsule     desogestrel-ethinyl estradiol (DESOGEN) 0.15-30 MG-MCG per tablet     Glatiramer Acetate (COPAXONE) 40 MG/ML SOSY     ergocalciferol (ERGOCALCIFEROL) 82935 units capsule     No current facility-administered medications for this visit.           ALLERGIES       Allergies   Allergen Reactions     Bees Anaphylaxis     Ferumoxytol Difficulty breathing     Penicillin G Rash     Sulfa Drugs Rash           REVIEW OF SYSTEMS:    Comprehensive review of systems otherwise was " negative, including constitutional, head and neck, cardiovascular, pulmonary, gastrointestinal, endocrine, urologic, reproductive, rheumatic, hematologic, immunologic, dermatologic, and psychiatric.    Nutritional concerns: None  Driving issues: None   Safety concerns regarding living situations and safety at home: None  Risk of falls: None  Pain: None    PHYSICAL EXAM:     Hair, skin, nails, and joints were normal. Neck was supple without Lhermitte's phenomenon.  There was no percussion tenderness over the spine.      The patient was alert and oriented to person, place, and time with normal language, attention and concentration, recent and remote memory, praxis, and intellectual function. Affect was normal. The patient did not appear depressed.     Visual acuity:  OD 20/20 OS 20/20   Correction: without     Visual fields were full to confrontation.   Pupils were 3 mm and briskly reactive OU without a relative afferent pupillary defect.  Funduscopic examination was normal without disc edema, erythema, or atrophy.  Extraocular movements: Intact without HORTENCIA  Facial sensation is normal. Normal strength of the muscles of mastication:   Muscles of facial expression were normal  Hearing was normal. Gag reflex and palatal movements were normal. Sternocleidomastoid and trapezius power were normal. Tongue movements were normal. There was no dysarthria.     Motor Examination:   There was no pronator drift.         Motor     Upper         Right Left   Shoulder Abduction 5 5   Elbow Flexion 5 5   Elbow Extension 5 5   Wrist Extension 5 5   Digit Extension 5 5   Digit Flexion 5 5   APB 5 5   Tone 0 0   Lower          Right Left   Hip Flexion 5 5   Knee Extension 5 5   Knee Flexion 5 5   Foot Dorsiflexion 5 5   Foot Plantar Flexion 5 5   EH 5 5   Toe Flexion 5 5   Tone 0 0             Reflexes:                Reflexes           Right   Left   Biceps 1   1   Triceps 1   1   Brachioradialis 1   1   Patellar  1   1   Achilles 1   1    Babinski down   up            Coordination:       Right Left   RRM Normal Normal   LYUDMILA Normal Normal   FTN Normal Normal   RRM Normal mirror movemnts Normal   HKS Normal Normal            Sensory examination:     Light touch:  Intact in all extremities        Coordination and Gait           Gait Normal     Right Left   Romberg Normal   Heel Normal Normal   Tandem Normal   Toe Normal Normal            QUANTITATIVE SCORES:     Visual: 0-Normal  Brainstem: 0-Normal  Pyramidal: 1-Signs only  Cerebellar: 0-Normal  Sensory: 2-mild decrease in touch or pain or position sense or moderate decrease in vibration in one or two limbs; and/or mild vibration or figure-writing or temperature decrease alone in more than two limbs  Bladder/Bowel: 0-Normal  Cerebral: 0-Normal  Ambulatory: 0-Unrestricted     EDSS: 1.5- no disability, minimal signs in more than one FS (more than one FS grade 1)          REVIEW OF IMAGING STUDIES:    I personally reviewed the following images:    MRI B, C and T spine from 5-8-19 were compared to 10-18 and show no interval change in size or number of lesions nor enhacements    ASSESSMENT:       RRMS clinically and radiologically stable on Copaxone.      PLAN:     Continue Copaxone indefinitely, start Ampyra or 4 AP to improve ambulation capacity and decrease exertional R foot drop  Continue exercise program.      Finally I will follow the patient up in 6 month(s) as long as the patient is doing well. I instructed the patient to call or mychart my office with any concerns or questions.    I spent 45 minutes in this visit, with >50% direct patient time spent reviewing and comparing images, counseling about prognosis, treatment options, and coordination of care.     My recommendations will be communicated back to the patient's physician(s) by mail.  Follow-up is expected to be with me.      Maryann Hooks MD  Chief, Multiple Sclerosis Division  Department of Neurology  [facility]      (Chart documentation was completed in part with Dragon voice-recognition software. Even though reviewed, some grammatical, spelling, and word errors may remain.)

## 2019-05-17 ENCOUNTER — TELEPHONE (OUTPATIENT)
Dept: NEUROLOGY | Facility: CLINIC | Age: 44
End: 2019-05-17

## 2019-05-17 NOTE — TELEPHONE ENCOUNTER
Patient was seen by Dr. Hooks on 5/15 and the decision was made to start her on Ampyra or 4-AP, whatever is cheaper. Start form for Ampyra signed by patient; pending Dr. Hooks's signature. No creatinine level was checked, nor was a 25-foot timed walk done.   Start form is in Dr. Hooks's folder.     In the meantime, a prescription for 4-AP 10 mg BID (q12h), #60 with 5 refills called into [facility] (phone: [phone]). Called patient and left [facility] requesting a call back to discuss this.

## 2019-09-18 DIAGNOSIS — G35 MS (MULTIPLE SCLEROSIS) (H): ICD-10-CM

## 2019-09-19 NOTE — TELEPHONE ENCOUNTER
Rx Authorization:    Requested Medication/ Dose vitamin D2 (ERGOCALCIFEROL) 78575 units (1250 mcg) capsule    Date last refill ordered: 08/15/18    Quantity ordered: 30    # refills: 3    Date of last clinic visit with ordering provider: 05/15/19    Date of next clinic visit with ordering provider: None Scheduled     All pertinent protocol data (lab date/result):     Include pertinent information from patients message:

## 2019-09-19 NOTE — TELEPHONE ENCOUNTER
Dr. Hooks, high dose vitamin D is not on our MS protocol. Do you want the patient to continue this?

## 2019-09-25 RX ORDER — ERGOCALCIFEROL 1.25 MG/1
CAPSULE, LIQUID FILLED ORAL
Qty: 30 CAPSULE | Refills: 0 | Status: SHIPPED | OUTPATIENT
Start: 2019-09-25 | End: 2019-11-20

## 2019-10-04 ENCOUNTER — TELEPHONE (OUTPATIENT)
Dept: PHARMACY | Facility: CLINIC | Age: 44
End: 2019-10-04

## 2019-10-04 ENCOUNTER — TELEPHONE (OUTPATIENT)
Dept: NEUROLOGY | Facility: CLINIC | Age: 44
End: 2019-10-04

## 2019-10-04 DIAGNOSIS — G35 MULTIPLE SCLEROSIS (H): ICD-10-CM

## 2019-10-04 RX ORDER — GLATIRAMER 40 MG/ML
40 INJECTION, SOLUTION SUBCUTANEOUS
Qty: 36 SYRINGE | Refills: 3 | Status: SHIPPED | OUTPATIENT
Start: 2019-10-04 | End: 2019-11-20 | Stop reason: SINTOL

## 2019-10-04 NOTE — TELEPHONE ENCOUNTER
Health Call Center    Phone Message    May a detailed message be left on voicemail: yes    Reason for Call: Medication Refill Request    Has the patient contacted the pharmacy for the refill? Yes   Name of medication being requested: Glatiramer Acetate (COPAXONE) 40 MG/ML SOSY  Provider who prescribed the medication: Dr Maryann Hooks  Pharmacy: [facility] - [address]  Date medication is needed: Asap    Pt needs a rx for 90 days supply. Only has as couple injections left and needs to have the rx delivered and signed for to receive. Please process asap.     Action Taken: Message routed to:  [facility]

## 2019-10-04 NOTE — TELEPHONE ENCOUNTER
Patient is scheduled to see Dr. Hooks on 11/27/19.   Dr. Hooks, did you want to get MRI imaging prior to this appointment?     Patient had B, C and T imaging on 5/8/19.

## 2019-10-04 NOTE — TELEPHONE ENCOUNTER
Received refill request for Copaxone from patient call; Patient was last seen in May and has follow up appointment in November with Dr. Hooks. Refilled for 1 year per MS refill protocol.    Sharmin BATRES

## 2019-10-04 NOTE — TELEPHONE ENCOUNTER
Health Call Center    Phone Message    May a detailed message be left on voicemail: yes    Reason for Call: Order(s): Other:   Reason for requested: MRI's  Date needed: on or [id number]  Provider name: Dr Maryann Hooks    Current MRI orders .       Action Taken: Message routed to:  [facility] ([facility]  Neurology

## 2019-10-04 NOTE — TELEPHONE ENCOUNTER
Rx Authorization:    Requested Medication/ Dose Glatiramer Acetate (COPAXONE) 40 MG/ML SOSY    Date last refill ordered: 11/14/18    Quantity ordered: 12    # refills: 11    Date of last clinic visit with ordering provider: 05/15/19    Date of next clinic visit with ordering provider: 11/27/19    All pertinent protocol data (lab date/result):     Include pertinent information from patients message:

## 2019-10-31 ENCOUNTER — TELEPHONE (OUTPATIENT)
Dept: NEUROLOGY | Facility: CLINIC | Age: 44
End: 2019-10-31

## 2019-10-31 DIAGNOSIS — G35 MULTIPLE SCLEROSIS (H): Primary | ICD-10-CM

## 2019-10-31 NOTE — TELEPHONE ENCOUNTER
Health Call Center    Phone Message    May a detailed message be left on voicemail: yes    Reason for Call: Symptoms or Concerns     If patient has red-flag symptoms, warm transfer to triage line    Current symptom or concern: Right side numbness and fell (twisted ankle)    Symptoms have been present for:  1 week(s)    Has patient previously been seen for this? Yes    By : Dr Maryann Hooks    Date: 5/15/19    Are there any new or worsening symptoms? Yes: pt fell last Tuesday and the numbness has gotten worse since. Pt is wondering if she needs to have a MRI Scan? Steriods? Please call pt to advised.       Action Taken: Message routed to:  [facility]

## 2019-11-01 RX ORDER — PREDNISONE 50 MG/1
1250 TABLET ORAL DAILY
Qty: 125 TABLET | Refills: 0 | Status: SHIPPED | OUTPATIENT
Start: 2019-11-01 | End: 2019-11-07

## 2019-11-01 NOTE — TELEPHONE ENCOUNTER
I called and spoke with patient. For the last week she has had an increase in symptoms. She developed numbness and weakness on the right side of her body from the breast down to her toes. She actually had a fall several days ago sustaining an ankle sprain. She also reports urinary and bowel urgency and had an episode of fecal incontinence 3 weeks ago. Her left side is unchanged from baseline. Upper extremities are unaffected. No vision changes.   She denies recent illness or s/s of UTI. She is scheduled to see Dr. Hooks on 11/13.     I called and spoke with Dr. Hooks by phone and she recommends the patient present to [facility] ED for urgent evaluation. Called the patient and informed her of this and she became very emotional. Her  is out of town for a week and she has 4 kids at home that that she is unsure she can get help with. She's going to make some calls and I will call her back in about an hour.

## 2019-11-01 NOTE — TELEPHONE ENCOUNTER
Spoke with patient and she says there's no way she can come to the hospital and would like an alternative plan.     I called Dr. Hooks and she would like to treat the patient with prednisone 1250 mg daily x 5 days and have her come in and see Yaneth next Thursday.   Rx sent to [facility]. Called and confirmed dose.     Called patient, she is agreeable to plan. We discussed side effects of steroids and to take with food to protect her stomach.   She will be scheduled to see Yaneth 11/7 at 0800.

## 2019-11-04 NOTE — TELEPHONE ENCOUNTER
Called and spoke with patient to check; she took her 3rd dose of high dose prednisone today and she reports improvement in her symptoms. Her balance is better and she reports less sensory symptoms in the lower body.   She will continue to plan on attending her appointment on Thursday with Yaneth. I encouraged her to call if questions or concerns come up prior to then.

## 2019-11-07 ENCOUNTER — OFFICE VISIT (OUTPATIENT)
Dept: NEUROLOGY | Facility: CLINIC | Age: 44
End: 2019-11-07
Attending: NURSE PRACTITIONER
Payer: OTHER GOVERNMENT

## 2019-11-07 VITALS
WEIGHT: 263.1 LBS | BODY MASS INDEX: 39.87 KG/M2 | DIASTOLIC BLOOD PRESSURE: 86 MMHG | SYSTOLIC BLOOD PRESSURE: 134 MMHG | HEART RATE: 64 BPM | HEIGHT: 68 IN

## 2019-11-07 DIAGNOSIS — G35 MULTIPLE SCLEROSIS (H): ICD-10-CM

## 2019-11-07 DIAGNOSIS — E55.9 VITAMIN D DEFICIENCY: Primary | ICD-10-CM

## 2019-11-07 DIAGNOSIS — G35 MS (MULTIPLE SCLEROSIS) (H): ICD-10-CM

## 2019-11-07 LAB
ALBUMIN UR-MCNC: NEGATIVE MG/DL
APPEARANCE UR: CLEAR
BACTERIA #/AREA URNS HPF: ABNORMAL /HPF
BILIRUB UR QL STRIP: NEGATIVE
COLOR UR AUTO: YELLOW
GLUCOSE UR STRIP-MCNC: NEGATIVE MG/DL
HGB UR QL STRIP: ABNORMAL
KETONES UR STRIP-MCNC: NEGATIVE MG/DL
LEUKOCYTE ESTERASE UR QL STRIP: NEGATIVE
MUCOUS THREADS #/AREA URNS LPF: PRESENT /LPF
NITRATE UR QL: NEGATIVE
PH UR STRIP: 6 PH (ref 5–7)
RBC #/AREA URNS AUTO: 1 /HPF (ref 0–2)
SOURCE: ABNORMAL
SP GR UR STRIP: 1.02 (ref 1–1.03)
SQUAMOUS #/AREA URNS AUTO: 1 /HPF (ref 0–1)
UROBILINOGEN UR STRIP-MCNC: 0 MG/DL (ref 0–2)
WBC #/AREA URNS AUTO: 2 /HPF (ref 0–5)

## 2019-11-07 PROCEDURE — 81001 URINALYSIS AUTO W/SCOPE: CPT | Performed by: NURSE PRACTITIONER

## 2019-11-07 PROCEDURE — G0463 HOSPITAL OUTPT CLINIC VISIT: HCPCS

## 2019-11-07 ASSESSMENT — ENCOUNTER SYMPTOMS
DISTURBANCES IN COORDINATION: 1
HEADACHES: 0
PARALYSIS: 0
SWOLLEN GLANDS: 1
ARTHRALGIAS: 0
MEMORY LOSS: 0
LOSS OF CONSCIOUSNESS: 0
DYSURIA: 0
TREMORS: 0
HEMATURIA: 0
SEIZURES: 0
NECK PAIN: 0
BACK PAIN: 0
NERVOUS/ANXIOUS: 0
INSOMNIA: 1
DIFFICULTY URINATING: 1
MUSCLE CRAMPS: 1
DIZZINESS: 1
DEPRESSION: 1
FLANK PAIN: 0
BRUISES/BLEEDS EASILY: 0
SPEECH CHANGE: 0
MYALGIAS: 0
DECREASED CONCENTRATION: 1
MUSCLE WEAKNESS: 1
WEAKNESS: 1
STIFFNESS: 1
NUMBNESS: 1
TINGLING: 1
JOINT SWELLING: 0

## 2019-11-07 ASSESSMENT — MIFFLIN-ST. JEOR: SCORE: 1896.91

## 2019-11-07 ASSESSMENT — PAIN SCALES - GENERAL: PAINLEVEL: NO PAIN (0)

## 2019-11-07 NOTE — PATIENT INSTRUCTIONS
1. Check urine today to r/o an infection.     2. MRI Brain and spine in 2 weeks.     3. Continue Copaxone for now.     4. Start taking Vit D 64367 international unit(s) every other day.     5. [facility] at  [facility].             [facility]       [address], [place] [address]       Phone: [phone]

## 2019-11-07 NOTE — LETTER
11/7/2019       RE: Chayo Nathan  [address] [facility] [address]     Dear Colleague,    Thank you for referring your patient, Chayo Nathan, to the [facility] MULTIPLE SCLEROSIS at [facility]. Please see a copy of my visit note below.    [facility] OUTPATIENT MULTIPLE SCLEROSIS CLINIC VISIT NOTE      REASON FOR VISIT: Chayo is a 44 year old right handed female who presents to the clinic today to discuss some new symptoms. She was most recently seen by Dr. Hooks on 5/15/2019. She presents to the evaluation alone.     HISTORY OF PRESENT ILLNESS: Patient started to notice right lower extremity numbness in 2013 and she did not seek medical care at that time.  She thought that it was from a pinched nerve.  Then in 2014, patient had bilateral vision loss which lasted for 3 days along with eye pain.  She was in [place] at that time.  She was seen at an ER but unfortunately no imaging was done at that time.  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  She also had some numbness and balance issues.  Imaging was done at that time which was consistent with a demyelinating disease.  She also had a spinal tap which was positive for oligoclonal bands.  She underwent 5-day course of IV Solu-Medrol with some improvement of her symptoms.  Patient was started on Copaxone in March 2018 and remains on that medication.    INTERVAL HISTORY SINCE LAST VISIT: Patient was seen by Dr. Hooks on 5/15/2019.  Then on 10/31/2019, patient contacted the clinic with some new symptoms.  Per patient, few days prior to that she tripped and fell while she was walking her dog in the park.  She started to notice weakness and numbness in her right leg.  She also noticed increased balance problems.  Patient was told to go to the ED when she contacted the clinic but unfortunately patient's dad was in the  [facility] and she had to take care of her children at home. Also her  was traveling. She was treated with 5 days of 1250 mg p.o. prednisone for a probable MS relapse.  She completed the last dose yesterday.  Per patient she noticed increased \"jitteriness\", insomnia and fatigue while she was on high-dose steroids.  Her general health has been stable. No fever or cough.     For MS, she is currently on Copaxone 3 times a week.  She uses 4-5 different injection sites.  Except the pain at the injection sites, no major side effects from Copaxone.  She reports left leg numbness, left foot drop and mild balance issues as residual symptoms.    Patient denies any new changes in vision, speech, swallowing or hearing.  She is right-handed.  Denies any numbness, tingling or weakness in her upper extremities.  Lower extremity symptoms as described above.  She continues to be able to climb stairs.  Her most recent fall was last week when she called the clinic.  She does not use any assistive devices.    Overall her mood is pretty stable.  She rates her fatigue as moderate.  She was given a prescription for amantadine during the past visit but patient tells me that she has never filled that prescription.  She sleeps 6 hours at night.  Reports some spasticity that wakes her up at night.  She has urinary urgency and mild hesitancy.  No incontinence issues.  She also reports some bowel urgency and had an episode of fecal incontinence in the past 2 months.     PAST MEDICAL/SURGICAL HISTORY:   1.  Relapsing remitting multiple sclerosis  2.  Gastric bypass  3.  Cholecystectomy  4.  Thyroidectomy  5.   x1  6.  Bilateral Lasix surgery    FAMILY HISTORY: Negative for multiple sclerosis.    SOCIAL HISTORY: She is , they have 4 children.  She works part-time as a medical surgical RN.  She denies smoking and recreational drug use.  Reports occasional alcohol use.    2019 MRI Brain:  Impression:   1. The study " "demonstrates 8 foci of T2-hyperintensity within the  cerebral white matter consistent with the clinical suspicion of  demyelinating disease.  2. There is no evidence for interval or active demyelination.       5/2019 MRI Cervical/ Thoracic spine:  Impression:   1. There are multiple foci of T2 hyperintense signal scattered  throughout the cervical spine which are nonsignificantly changed since  10/19/2018 and likely represent chronic demyelinating plaque.  2. There are several foci of T2 hyperintense signal scattered  throughout the thoracic spinal cord. No comparison is available for  review  3. There is no evidence for interval or active demyelination.  4. No definite spinal canal or neural foraminal stenosis at any level.    Vit D: 1000 international unit(s) daily.     PHYSICAL EXAMINATION:   VITAL SIGNS: /86 (BP Location: Right arm, Patient Position: Chair, Cuff Size: Adult Large)   Pulse 64   Ht 1.727 m (5' 8\")   Wt 119.3 kg (263 lb 1.6 oz)   BMI 40.00 kg/m      MENTAL STATUS: Alert,awake and oriented times four.   CRANIAL NERVES:  Visual fields are full to confrontation. The pupils are equal, round and react to light and there is no Mark Jane pupil. Funduscopic examination demonstrates no optic pallor, papilledema or retinal hemorrhage/exudate. Extraocular movements are intact with no internuclear ophthalmoplegia. No nystagmus. Facial strength and sensation are normal. Hearing is normal. Palate elevation and tongue protrusion are  normal.   POWER: Strength is as follows in the following muscles/groups (Right/Left,MRC scale): Shoulder abduction 5/5, elbow flexion 5/5, elbow extension 5/5, wrist extension 5/5, digit extension 5/5, digit flexion 5/5, Hip flexion 4+/4-, knee extension 5/5, knee flexion 4+/5, foot dorsiflexion 4-/4+.   SENSORY: Subjective report of increased sensitivity to light touch in her right lower extremity.   REFLEXES: Reflexes are normal, present and symmetric at biceps, " triceps, brachioradialis. Asymmetric knee reflexes, right > left.   MOTOR/CEREBELLAR: There are no tremors, myoclonus or other abnormal movements. Tone is within normal limits in the limbs. There is no appendicular ataxia on finger-to-nose testing and rapid alternating movements are normal in the extremities. There is no pronator drift. Romberg positive.   GAIT: Gait is slightly wide-based. Tandem walking and heel walking somewhat difficult to perform, but ultimately did very slowly. Normal toe walking. When she started to do the walking, she almost lost her balance and leaned towards the right side.     ASSESSMENT/ PLAN:   1. Relapsing Remitting MS, on Copaxone: Initial symptoms in 2013 but unfortunately her diagnosis was not made until 2018.  Patient has been on Copaxone since March 2018.  Today, patient presenting with new right lower extremity weakness and numbness/tingling along with worsening balance issues since 10/31.  Today's exam is somewhat worse compared to her prior exam in May 2019.  She was treated with a 5-day course of high-dose steroids orally and she had her last dose yesterday.  We will get imaging, MRI brain and spine to rule out new or enhancing lesions.  She will continue Copaxone for now.  I told the patient that if she actually has any new or enhancing lesions in her MRI, she may have to change her treatment from Copaxone to a different DMT.  But I will leave this decision to Dr. Hooks.  I will follow-up with her when we have the imaging results available.  Also discussed physical therapy evaluation at [facility] and patient is interested.  Referral order was placed in the chart and patient to call the clinic and schedule this appointment. We will also check her urine to r/o an urinary tract infection.    2.  Vitamin D level: Patient currently takes 1000 international unit(s) daily.  Her most recent level from April 2019 was somewhat low at 22.  I started her on vitamin D3 10,000  international units every other day.  Patient has a history of kidney stones in the past.  We will recheck her vitamin D level in 6 months.    3. Fatigue: Patient reports mild to moderate fatigue.  She was prescribed amantadine in the past by Dr. Hooks.  Patient never filled this prescription up.  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    4. Please contact us with questions or concerns.     Yaneth Luna CNP  [facility] [place] [facility]    I spent 55 minutes with this patient today, greater than 50% of which was spent in counseling and coordination of care.     (Chart documentation was completed in part with Dragon voice-recognition software. Even though reviewed, some grammatical, spelling, and word errors may remain.)

## 2019-11-07 NOTE — PROGRESS NOTES
"[facility] OUTPATIENT MULTIPLE SCLEROSIS CLINIC VISIT NOTE      REASON FOR VISIT: Chayo is a 44 year old right handed female who presents to the clinic today to discuss some new symptoms. She was most recently seen by Dr. Hooks on 5/15/2019. She presents to the evaluation alone.     HISTORY OF PRESENT ILLNESS: Patient started to notice right lower extremity numbness in 2013 and she did not seek medical care at that time.  She thought that it was from a pinched nerve.  Then in 2014, patient had bilateral vision loss which lasted for 3 days along with eye pain.  She was in [place] at that time.  She was seen at an ER but unfortunately no imaging was done at that time.  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  She also had some numbness and balance issues.  Imaging was done at that time which was consistent with a demyelinating disease.  She also had a spinal tap which was positive for oligoclonal bands.  She underwent 5-day course of IV Solu-Medrol with some improvement of her symptoms.  Patient was started on Copaxone in March 2018 and remains on that medication.    INTERVAL HISTORY SINCE LAST VISIT: Patient was seen by Dr. Hooks on 5/15/2019.  Then on 10/31/2019, patient contacted the clinic with some new symptoms.  Per patient, few days prior to that she tripped and fell while she was walking her dog in the park.  She started to notice weakness and numbness in her right leg.  She also noticed increased balance problems.  Patient was told to go to the ED when she contacted the clinic but unfortunately patient's dad was in the hospital and she had to take care of her children at home. Also her  was traveling. She was treated with 5 days of 1250 mg p.o. prednisone for a probable MS relapse.  She completed the last dose yesterday.  Per patient she noticed increased \"jitteriness\", insomnia and fatigue while " she was on high-dose steroids.  Her general health has been stable. No fever or cough.     For MS, she is currently on Copaxone 3 times a week.  She uses 4-5 different injection sites.  Except the pain at the injection sites, no major side effects from Copaxone.  She reports left leg numbness, left foot drop and mild balance issues as residual symptoms.    Patient denies any new changes in vision, speech, swallowing or hearing.  She is right-handed.  Denies any numbness, tingling or weakness in her upper extremities.  Lower extremity symptoms as described above.  She continues to be able to climb stairs.  Her most recent fall was last week when she called the clinic.  She does not use any assistive devices.    Overall her mood is pretty stable.  She rates her fatigue as moderate.  She was given a prescription for amantadine during the past visit but patient tells me that she has never filled that prescription.  She sleeps 6 hours at night.  Reports some spasticity that wakes her up at night.  She has urinary urgency and mild hesitancy.  No incontinence issues.  She also reports some bowel urgency and had an episode of fecal incontinence in the past 2 months.     PAST MEDICAL/SURGICAL HISTORY:   1.  Relapsing remitting multiple sclerosis  2.  Gastric bypass  3.  Cholecystectomy  4.  Thyroidectomy  5.   x1  6.  Bilateral Lasix surgery    FAMILY HISTORY: Negative for multiple sclerosis.    SOCIAL HISTORY: She is , they have 4 children.  She works part-time as a medical surgical RN.  She denies smoking and recreational drug use.  Reports occasional alcohol use.    2019 MRI Brain:  Impression:   1. The study demonstrates 8 foci of T2-hyperintensity within the  cerebral white matter consistent with the clinical suspicion of  demyelinating disease.  2. There is no evidence for interval or active demyelination.       2019 MRI Cervical/ Thoracic spine:  Impression:   1. There are multiple foci of T2  "hyperintense signal scattered  throughout the cervical spine which are nonsignificantly changed since  10/19/2018 and likely represent chronic demyelinating plaque.  2. There are several foci of T2 hyperintense signal scattered  throughout the thoracic spinal cord. No comparison is available for  review  3. There is no evidence for interval or active demyelination.  4. No definite spinal canal or neural foraminal stenosis at any level.    Vit D: 1000 international unit(s) daily.     PHYSICAL EXAMINATION:   VITAL SIGNS: /86 (BP Location: Right arm, Patient Position: Chair, Cuff Size: Adult Large)   Pulse 64   Ht 1.727 m (5' 8\")   Wt 119.3 kg (263 lb 1.6 oz)   BMI 40.00 kg/m     MENTAL STATUS: Alert,awake and oriented times four.   CRANIAL NERVES:  Visual fields are full to confrontation. The pupils are equal, round and react to light and there is no Mark Jane pupil. Funduscopic examination demonstrates no optic pallor, papilledema or retinal hemorrhage/exudate. Extraocular movements are intact with no internuclear ophthalmoplegia. No nystagmus. Facial strength and sensation are normal. Hearing is normal. Palate elevation and tongue protrusion are  normal.   POWER: Strength is as follows in the following muscles/groups (Right/Left,MRC scale): Shoulder abduction 5/5, elbow flexion 5/5, elbow extension 5/5, wrist extension 5/5, digit extension 5/5, digit flexion 5/5, Hip flexion 4+/4-, knee extension 5/5, knee flexion 4+/5, foot dorsiflexion 4-/4+.   SENSORY: Subjective report of increased sensitivity to light touch in her right lower extremity.   REFLEXES: Reflexes are normal, present and symmetric at biceps, triceps, brachioradialis. Asymmetric knee reflexes, right > left.   MOTOR/CEREBELLAR: There are no tremors, myoclonus or other abnormal movements. Tone is within normal limits in the limbs. There is no appendicular ataxia on finger-to-nose testing and rapid alternating movements are normal in the " extremities. There is no pronator drift. Romberg positive.   GAIT: Gait is slightly wide-based. Tandem walking and heel walking somewhat difficult to perform, but ultimately did very slowly. Normal toe walking. When she started to do the walking, she almost lost her balance and leaned towards the right side.     ASSESSMENT/ PLAN:   1. Relapsing Remitting MS, on Copaxone: Initial symptoms in 2013 but unfortunately her diagnosis was not made until 2018.  Patient has been on Copaxone since March 2018.  Today, patient presenting with new right lower extremity weakness and numbness/tingling along with worsening balance issues since 10/31.  Today's exam is somewhat worse compared to her prior exam in May 2019.  She was treated with a 5-day course of high-dose steroids orally and she had her last dose yesterday.  We will get imaging, MRI brain and spine to rule out new or enhancing lesions.  She will continue Copaxone for now.  I told the patient that if she actually has any new or enhancing lesions in her MRI, she may have to change her treatment from Copaxone to a different DMT.  But I will leave this decision to Dr. Hooks.  I will follow-up with her when we have the imaging results available.  Also discussed physical therapy evaluation at [facility] and patient is interested.  Referral order was placed in the chart and patient to call the clinic and schedule this appointment. We will also check her urine to r/o an urinary tract infection.    2.  Vitamin D level: Patient currently takes 1000 international unit(s) daily.  Her most recent level from April 2019 was somewhat low at 22.  I started her on vitamin D3 10,000 international units every other day.  Patient has a history of kidney stones in the past.  We will recheck her vitamin D level in 6 months.    3. Fatigue: Patient reports mild to moderate fatigue.  She was prescribed amantadine in the past by Dr. Hooks.  Patient never filled this prescription  up.  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    4. Please contact us with questions or concerns.     Yaneth Luna CNP  [facility]          I spent 55 minutes with this patient today, greater than 50% of which was spent in counseling and coordination of care.     (Chart documentation was completed in part with Dragon voice-recognition software. Even though reviewed, some grammatical, spelling, and word errors may remain.)

## 2019-11-17 ENCOUNTER — ANCILLARY PROCEDURE (OUTPATIENT)
Dept: MRI IMAGING | Facility: CLINIC | Age: 44
End: 2019-11-17
Attending: NURSE PRACTITIONER
Payer: OTHER GOVERNMENT

## 2019-11-17 DIAGNOSIS — G35 MULTIPLE SCLEROSIS (H): ICD-10-CM

## 2019-11-17 RX ORDER — GADOBUTROL 604.72 MG/ML
10 INJECTION INTRAVENOUS ONCE
Status: COMPLETED | OUTPATIENT
Start: 2019-11-17 | End: 2019-11-17

## 2019-11-17 RX ADMIN — GADOBUTROL 10 ML: 604.72 INJECTION INTRAVENOUS at 15:11

## 2019-11-17 NOTE — DISCHARGE INSTRUCTIONS
MRI Contrast Discharge Instructions    The IV contrast you received today will pass out of your body in your  urine. This will happen in the next 24 hours. You will not feel this process.  Your urine will not change color.    Drink at least 4 extra glasses of water or juice today (unless your doctor  has restricted your fluids). This reduces the stress on your kidneys.  You may take your regular medicines.    If you are on dialysis: It is best to have dialysis today.    If you have a reaction: Most reactions happen right away. If you have  any new symptoms after leaving the hospital (such as hives or swelling),  call your hospital at the correct number below. Or call your family doctor.  If you have breathing distress or wheezing, call 911.    Special instructions: ***    I have read and understand the above information.    Signature:______________________________________ Date:___________    Staff:__________________________________________ Date:___________     Time:__________    [place] [facility] Departments:    ___[place]: [phone]  ___[facility]: [phone]  ___[place]: [phone] ___[facility]: [phone]  ___[facility]: [phone]  ___[facility]: [phone]  ___[phone]  ___[facility]: [phone]  ___[phone]

## 2019-11-17 NOTE — DISCHARGE INSTRUCTIONS
MRI Contrast Discharge Instructions    The IV contrast you received today will pass out of your body in your  urine. This will happen in the next 24 hours. You will not feel this process.  Your urine will not change color.    Drink at least 4 extra glasses of water or juice today (unless your doctor  has restricted your fluids). This reduces the stress on your kidneys.  You may take your regular medicines.    If you are on dialysis: It is best to have dialysis today.    If you have a reaction: Most reactions happen right away. If you have  any new symptoms after leaving the hospital (such as hives or swelling),  call your hospital at the correct number below. Or call your family doctor.  If you have breathing distress or wheezing, call 911.    Special instructions: ***    I have read and understand the above information.    Signature:______________________________________ Date:___________    Staff:__________________________________________ Date:___________     Time:__________    [place] [facility] Departments:    ___[place]: [phone]  ___[facility]: [phone]  ___[place]: [phone] ___[facility]: [phone]  ___[facility]: [phone]  ___[facility]: [phone]  ___[phone]  ___[facility]: [phone]  ___[phone]

## 2019-11-20 ENCOUNTER — OFFICE VISIT (OUTPATIENT)
Dept: NEUROLOGY | Facility: CLINIC | Age: 44
End: 2019-11-20
Attending: PSYCHIATRY & NEUROLOGY
Payer: OTHER GOVERNMENT

## 2019-11-20 VITALS
DIASTOLIC BLOOD PRESSURE: 85 MMHG | SYSTOLIC BLOOD PRESSURE: 127 MMHG | WEIGHT: 261 LBS | HEART RATE: 73 BPM | BODY MASS INDEX: 39.56 KG/M2 | HEIGHT: 68 IN

## 2019-11-20 DIAGNOSIS — G35 MULTIPLE SCLEROSIS (H): Primary | ICD-10-CM

## 2019-11-20 DIAGNOSIS — G35 MULTIPLE SCLEROSIS (H): ICD-10-CM

## 2019-11-20 LAB
ALBUMIN SERPL-MCNC: 3.2 G/DL (ref 3.4–5)
ALP SERPL-CCNC: 80 U/L (ref 40–150)
ALT SERPL W P-5'-P-CCNC: 23 U/L (ref 0–50)
ANION GAP SERPL CALCULATED.3IONS-SCNC: 6 MMOL/L (ref 3–14)
AST SERPL W P-5'-P-CCNC: 15 U/L (ref 0–45)
BASOPHILS # BLD AUTO: 0.1 10E9/L (ref 0–0.2)
BASOPHILS NFR BLD AUTO: 1.2 %
BILIRUB SERPL-MCNC: 0.2 MG/DL (ref 0.2–1.3)
BUN SERPL-MCNC: 10 MG/DL (ref 7–30)
CALCIUM SERPL-MCNC: 8.6 MG/DL (ref 8.5–10.1)
CHLORIDE SERPL-SCNC: 107 MMOL/L (ref 94–109)
CO2 SERPL-SCNC: 25 MMOL/L (ref 20–32)
CREAT SERPL-MCNC: 0.67 MG/DL (ref 0.52–1.04)
DIFFERENTIAL METHOD BLD: ABNORMAL
EOSINOPHIL # BLD AUTO: 0.2 10E9/L (ref 0–0.7)
EOSINOPHIL NFR BLD AUTO: 2.2 %
ERYTHROCYTE [DISTWIDTH] IN BLOOD BY AUTOMATED COUNT: 16.1 % (ref 10–15)
GFR SERPL CREATININE-BSD FRML MDRD: >90 ML/MIN/{1.73_M2}
GLUCOSE SERPL-MCNC: 85 MG/DL (ref 70–99)
HCT VFR BLD AUTO: 35.6 % (ref 35–47)
HGB BLD-MCNC: 10.5 G/DL (ref 11.7–15.7)
IMM GRANULOCYTES # BLD: 0 10E9/L (ref 0–0.4)
IMM GRANULOCYTES NFR BLD: 0.4 %
LYMPHOCYTES # BLD AUTO: 2 10E9/L (ref 0.8–5.3)
LYMPHOCYTES NFR BLD AUTO: 28.8 %
MCH RBC QN AUTO: 22.5 PG (ref 26.5–33)
MCHC RBC AUTO-ENTMCNC: 29.5 G/DL (ref 31.5–36.5)
MCV RBC AUTO: 76 FL (ref 78–100)
MONOCYTES # BLD AUTO: 0.8 10E9/L (ref 0–1.3)
MONOCYTES NFR BLD AUTO: 11.2 %
NEUTROPHILS # BLD AUTO: 3.9 10E9/L (ref 1.6–8.3)
NEUTROPHILS NFR BLD AUTO: 56.2 %
NRBC # BLD AUTO: 0 10*3/UL
NRBC BLD AUTO-RTO: 0 /100
PLATELET # BLD AUTO: 254 10E9/L (ref 150–450)
POTASSIUM SERPL-SCNC: 3.8 MMOL/L (ref 3.4–5.3)
PROT SERPL-MCNC: 7.1 G/DL (ref 6.8–8.8)
RBC # BLD AUTO: 4.66 10E12/L (ref 3.8–5.2)
SODIUM SERPL-SCNC: 139 MMOL/L (ref 133–144)
WBC # BLD AUTO: 6.9 10E9/L (ref 4–11)

## 2019-11-20 PROCEDURE — G0463 HOSPITAL OUTPT CLINIC VISIT: HCPCS

## 2019-11-20 PROCEDURE — 80053 COMPREHEN METABOLIC PANEL: CPT | Performed by: PSYCHIATRY & NEUROLOGY

## 2019-11-20 PROCEDURE — 82306 VITAMIN D 25 HYDROXY: CPT | Performed by: PSYCHIATRY & NEUROLOGY

## 2019-11-20 PROCEDURE — 36415 COLL VENOUS BLD VENIPUNCTURE: CPT | Performed by: PSYCHIATRY & NEUROLOGY

## 2019-11-20 PROCEDURE — 40000975 ZZHCL STATISTIC JC VIR AB INDEX INHIB: Performed by: PSYCHIATRY & NEUROLOGY

## 2019-11-20 PROCEDURE — 85025 COMPLETE CBC W/AUTO DIFF WBC: CPT | Performed by: PSYCHIATRY & NEUROLOGY

## 2019-11-20 RX ORDER — BACLOFEN 10 MG/1
10 TABLET ORAL AT BEDTIME
Qty: 30 TABLET | Refills: 3 | Status: SHIPPED | OUTPATIENT
Start: 2019-11-20 | End: 2022-10-23

## 2019-11-20 ASSESSMENT — PAIN SCALES - GENERAL: PAINLEVEL: NO PAIN (0)

## 2019-11-20 ASSESSMENT — MIFFLIN-ST. JEOR: SCORE: 1887.39

## 2019-11-20 NOTE — PROGRESS NOTES
THE [facility] MULTIPLE SCLEROSIS CLINIC  FOLLOW UP VISIT           PRINCIPAL NEUROLOGIC DIAGNOSIS: Multiple Sclerosis      HISTORY OF ILLNESS:    This is a follow visit for this 44 year old right handed genetic female  With a history of MS RR. Who was last seen on  5-19. At that time the patient was recommended to start 4 AP for fatigue. Since last visit she did not take it because it was difficult to  from the pharmacy. She vacationed in [place] (six noon[place]) in June and only had balance issues when hot outside. She did well until October when she felt exhausted and started getting weaker on her R leg, fell at the dog park, and injured her ankle after this she noticed numbness starting in her R leg, at some point it was at the level of her bra, she also noticed she was stumbling a lot. She called first week in November and I asked her to take 5 days of PO prednisone, she saw Yaneth on 11-7 and was still off balance, an MRI was ordered and a new lesion was seen, Yaneth reported this to her and she understands she needs a stronger DMT, Copaxone is no longer an option. She is back to her previous baseline 100%, balance is better.     Current Symptoms:  1. Night cramps      Current Outpatient Prescriptions:  Current Outpatient Medications   Medication     amantadine (SYMMETREL) 100 MG capsule     desogestrel-ethinyl estradiol (DESOGEN) 0.15-30 MG-MCG per tablet     Glatiramer Acetate (COPAXONE) 40 MG/ML SOSY     vitamin D3 (CHOLECALCIFEROL) 39317 units (250 mcg) capsule     No current facility-administered medications for this visit.           ALLERGIES       Allergies   Allergen Reactions     Bees Anaphylaxis     Ferumoxytol Difficulty breathing     Penicillin G Rash     Sulfa Drugs Rash           REVIEW OF SYSTEMS:    Comprehensive review of systems otherwise was negative, including constitutional, head and neck, cardiovascular, pulmonary, gastrointestinal, endocrine, urologic, reproductive,  rheumatic, hematologic, immunologic, dermatologic, and psychiatric.    Nutritional concerns: None  Driving issues: None   Safety concerns regarding living situations and safety at home: None  Risk of falls: None  Pain: None    PHYSICAL EXAM:     Hair, skin, nails, and joints were normal. Neck was supple without Lhermitte's phenomenon.  There was no percussion tenderness over the spine.      The patient was alert and oriented to person, place, and time with normal language, attention and concentration, recent and remote memory, praxis, and intellectual function. Affect was normal. The patient did not appear depressed.     Visual acuity:  OD 20/20 OS 20/20   Correction: without     Visual fields were full to confrontation.   Pupils were 3 mm and briskly reactive OU without a relative afferent pupillary defect.  Funduscopic examination was normal without disc edema, erythema, or atrophy.  Extraocular movements: Intact without HORTENCIA  Facial sensation is normal. Normal strength of the muscles of mastication:   Muscles of facial expression were normal  Hearing was normal. Gag reflex and palatal movements were normal. Sternocleidomastoid and trapezius power were normal. Tongue movements were normal. There was no dysarthria.     Motor Examination:   There was no pronator drift.         Motor     Upper         Right Left   Shoulder Abduction 5 5   Elbow Flexion 5 5   Elbow Extension 5 5   Wrist Extension 5 5   Digit Extension 5 5   Digit Flexion 5 5   APB 5 5   Tone 0 0   Lower          Right Left   Hip Flexion 5 5   Knee Extension 5 5   Knee Flexion 5 5   Foot Dorsiflexion 5 5   Foot Plantar Flexion 5 5   EH 5 5   Toe Flexion 5 5   Tone 0 0              Reflexes:                Reflexes           Right   Left   Biceps 1   1   Triceps 1   1   Brachioradialis 1   1   Patellar  1   1   Achilles 1   1   Babinski down   up            Coordination:       Right Left   RRM Normal Normal   LYUDMILA Normal Normal   FTN Normal Normal   RRM  Normal mirror movemnts Normal   HKS Normal Normal            Sensory examination:     Light touch:  Intact in all extremities        Coordination and Gait           Gait Normal     Right Left   Romberg Normal   Heel Normal Normal   Tandem Normal   Toe Normal Normal            QUANTITATIVE SCORES:     Visual: 0-Normal  Brainstem: 0-Normal  Pyramidal: 1-Signs only  Cerebellar: 0-Normal  Sensory: 2-mild decrease in touch or pain or position sense or moderate decrease in vibration in one or two limbs; and/or mild vibration or figure-writing or temperature decrease alone in more than two limbs  Bladder/Bowel: 0-Normal  Cerebral: 0-Normal  Ambulatory: 0-Unrestricted     EDSS: 1.5- no disability, minimal signs in more than one FS (more than one FS grade 1)         REVIEW OF IMAGING STUDIES:    I personally reviewed the following images: MRI B, C and T spine [id number], B and C spine unchanged, T spine:    Multilevel abnormal T2 hyperintense signal in cervical and thoracic spinal cord, consistent with demyelinating plaques. Small new plaque in the left hemicord at T3-4 demonstrates associated mild contrast enhancement suggesting active demyelination.      ASSESSMENT:    RRMS S/P relapse in 11-19 while on Copaxone, (no compliance issues) with return to previous baseline. This implies Copaxone failure    PLAN:    Today we discussed alternative therapies such as Gilenya and Tysabri, efficacy, side effects and monitoring needs as well as risk for PML were discussed and she will be started on Tysabri, she has a significant lesion load in the spinal cord. Currently she has 1 enhancing lesion in the L hemicord despite taking 5 days of PO prednisone, this is not the lesion that caused the relapse since it is on the opposite side of the cord which suggests significant inflammatory activity despite steroids.    We will sign the touch form and we will obtain baseline blood work CBC, CMP. JCV, Vit D    Baclofen 10 mg po at  bedtime  Magnesium 200 mg po at bedtime.    Finally I will follow the patient up in 2 month(s) as long as the patient is doing well. I instructed the patient to call or mychart my office with any concerns or questions.    I spent 45 minutes in this visit, with >50% direct patient time spent counseling about prognosis, treatment options, and coordination of care.     My recommendations will be communicated back to the patient's physician(s) by mail.  Follow-up is expected to be with me.      Maryann Hooks MD  Chief, Multiple Sclerosis Division  Department of Neurology  [facility]      (Chart documentation was completed in part with Dragon voice-recognition software. Even though reviewed, some grammatical, spelling, and word errors may remain.)

## 2019-11-20 NOTE — PATIENT INSTRUCTIONS
RRMS s/p exacerbation which implies Copaxone failure  Plan is to switch to Tysabri since she has a significant lesion load in the spinal cord. Currently she has 1 enhancing lesion despite taking 5 days of PO prednisone, this is not the lesion that caused the relapse since it is on the opposite side of the cord.    Today we will sign the touch form and we will obtain baseline blood work CBC, CMP. JCV, Vit D    Baclofen 10 mg po at bedtime  Magnesium 200 mg po at bedtime.

## 2019-11-20 NOTE — LETTER
11/20/2019       RE: Chayo Nathan  [address] [facility] [address]     Dear Colleague,    Thank you for referring your patient, Chayo Nathan, to the [facility] MULTIPLE SCLEROSIS at [facility]. Please see a copy of my visit note below.    THE [facility] MULTIPLE SCLEROSIS CLINIC  FOLLOW UP VISIT       PRINCIPAL NEUROLOGIC DIAGNOSIS: Multiple Sclerosis      HISTORY OF ILLNESS:    This is a follow visit for this 44 year old right handed genetic female  With a history of MS RR. Who was last seen on  5-19. At that time the patient was recommended to start 4 AP for fatigue. Since last visit she did not take it because it was difficult to  from the pharmacy. She vacationed in [place] (six noon[place]) in June and only had balance issues when hot outside. She did well until October when she felt exhausted and started getting weaker on her R leg, fell at the dog park, and injured her ankle after this she noticed numbness starting in her R leg, at some point it was at the level of her bra, she also noticed she was stumbling a lot. She called first week in November and I asked her to take 5 days of PO prednisone, she saw Yaneth on 11-7 and was still off balance, an MRI was ordered and a new lesion was seen, Yaneth reported this to her and she understands she needs a stronger DMT, Copaxone is no longer an option. She is back to her previous baseline 100%, balance is better.     Current Symptoms:  1. Night cramps      Current Outpatient Prescriptions:  Current Outpatient Medications   Medication     amantadine (SYMMETREL) 100 MG capsule     desogestrel-ethinyl estradiol (DESOGEN) 0.15-30 MG-MCG per tablet     Glatiramer Acetate (COPAXONE) 40 MG/ML SOSY     vitamin D3 (CHOLECALCIFEROL) 48307 units (250 mcg) capsule     No current facility-administered medications for this visit.          ALLERGIES    Allergies   Allergen Reactions     Bees Anaphylaxis      Ferumoxytol Difficulty breathing     Penicillin G Rash     Sulfa Drugs Rash         REVIEW OF SYSTEMS:    Comprehensive review of systems otherwise was negative, including constitutional, head and neck, cardiovascular, pulmonary, gastrointestinal, endocrine, urologic, reproductive, rheumatic, hematologic, immunologic, dermatologic, and psychiatric.    Nutritional concerns: None  Driving issues: None   Safety concerns regarding living situations and safety at home: None  Risk of falls: None  Pain: None    PHYSICAL EXAM:     Hair, skin, nails, and joints were normal. Neck was supple without Lhermitte's phenomenon.  There was no percussion tenderness over the spine.      The patient was alert and oriented to person, place, and time with normal language, attention and concentration, recent and remote memory, praxis, and intellectual function. Affect was normal. The patient did not appear depressed.     Visual acuity:  OD 20/20 OS 20/20   Correction: without     Visual fields were full to confrontation.   Pupils were 3 mm and briskly reactive OU without a relative afferent pupillary defect.  Funduscopic examination was normal without disc edema, erythema, or atrophy.  Extraocular movements: Intact without HORTENCIA  Facial sensation is normal. Normal strength of the muscles of mastication:   Muscles of facial expression were normal  Hearing was normal. Gag reflex and palatal movements were normal. Sternocleidomastoid and trapezius power were normal. Tongue movements were normal. There was no dysarthria.     Motor Examination:   There was no pronator drift.         Motor     Upper         Right Left   Shoulder Abduction 5 5   Elbow Flexion 5 5   Elbow Extension 5 5   Wrist Extension 5 5   Digit Extension 5 5   Digit Flexion 5 5   APB 5 5   Tone 0 0   Lower          Right Left   Hip Flexion 5 5   Knee Extension 5 5   Knee Flexion 5 5   Foot Dorsiflexion 5 5   Foot Plantar Flexion 5 5   EH 5 5   Toe Flexion 5 5   Tone 0 0               Reflexes:                Reflexes           Right   Left   Biceps 1   1   Triceps 1   1   Brachioradialis 1   1   Patellar  1   1   Achilles 1   1   Babinski down   up            Coordination:       Right Left   RRM Normal Normal   LYUDMILA Normal Normal   FTN Normal Normal   RRM Normal mirror movemnts Normal   HKS Normal Normal            Sensory examination:     Light touch:  Intact in all extremities        Coordination and Gait           Gait Normal     Right Left   Romberg Normal   Heel Normal Normal   Tandem Normal   Toe Normal Normal            QUANTITATIVE SCORES:     Visual: 0-Normal  Brainstem: 0-Normal  Pyramidal: 1-Signs only  Cerebellar: 0-Normal  Sensory: 2-mild decrease in touch or pain or position sense or moderate decrease in vibration in one or two limbs; and/or mild vibration or figure-writing or temperature decrease alone in more than two limbs  Bladder/Bowel: 0-Normal  Cerebral: 0-Normal  Ambulatory: 0-Unrestricted     EDSS: 1.5- no disability, minimal signs in more than one FS (more than one FS grade 1)      REVIEW OF IMAGING STUDIES:    I personally reviewed the following images: MRI B, C and T spine [id number], B and C spine unchanged, T spine:    Multilevel abnormal T2 hyperintense signal in cervical and thoracic spinal cord, consistent with demyelinating plaques. Small new plaque in the left hemicord at T3-4 demonstrates associated mild contrast enhancement suggesting active demyelination.    ASSESSMENT:    RRMS S/P relapse in 11-19 while on Copaxone, (no compliance issues) with return to previous baseline. This implies Copaxone failure    PLAN:    Today we discussed alternative therapies such as Gilenya and Tysabri, efficacy, side effects and monitoring needs as well as risk for PML were discussed and she will be started on Tysabri, she has a significant lesion load in the spinal cord. Currently she has 1 enhancing lesion in the L hemicord despite taking 5 days of PO prednisone,  this is not the lesion that caused the relapse since it is on the opposite side of the cord which suggests significant inflammatory activity despite steroids.    We will sign the touch form and we will obtain baseline blood work CBC, CMP. JCV, Vit D    Baclofen 10 mg po at bedtime  Magnesium 200 mg po at bedtime.    Finally I will follow the patient up in 2 month(s) as long as the patient is doing well. I instructed the patient to call or mychart my office with any concerns or questions.  I spent 45 minutes in this visit, with >50% direct patient time spent counseling about prognosis, treatment options, and coordination of care.     My recommendations will be communicated back to the patient's physician(s) by mail.  Follow-up is expected to be with me.    Maryann Hooks MD  Chief, Multiple Sclerosis Division  Department of Neurology  [facility]    (Chart documentation was completed in part with Dragon voice-recognition software. Even though reviewed, some grammatical, spelling, and word errors may remain.)

## 2019-11-21 ENCOUNTER — TELEPHONE (OUTPATIENT)
Dept: NEUROLOGY | Facility: CLINIC | Age: 44
End: 2019-11-21

## 2019-11-21 DIAGNOSIS — G35 MULTIPLE SCLEROSIS (H): ICD-10-CM

## 2019-11-21 LAB — DEPRECATED CALCIDIOL+CALCIFEROL SERPL-MC: 39 UG/L (ref 20–75)

## 2019-11-21 NOTE — TELEPHONE ENCOUNTER
Patient was seen by Dr. Hooks yesterday and the decision was made to start her on Tysabri. Baseline labs pending. Start form signed by patient and placed in Dr. Hooks's folder for signature.     Therapy plan entered and routed to Dr. Hooks. Once signed, will submit PAJarod Peters message sent to patient inquiring where she would like to infuse.

## 2019-11-27 RX ORDER — HEPARIN SODIUM (PORCINE) LOCK FLUSH IV SOLN 100 UNIT/ML 100 UNIT/ML
5 SOLUTION INTRAVENOUS
Status: CANCELLED | OUTPATIENT
Start: 2019-11-27

## 2019-11-27 RX ORDER — HEPARIN SODIUM,PORCINE 10 UNIT/ML
5 VIAL (ML) INTRAVENOUS
Status: CANCELLED | OUTPATIENT
Start: 2019-11-27

## 2019-11-29 ENCOUNTER — TELEPHONE (OUTPATIENT)
Dept: NEUROLOGY | Facility: CLINIC | Age: 44
End: 2019-11-29

## 2019-11-29 LAB — LAB SCANNED RESULT: ABNORMAL

## 2019-11-29 NOTE — TELEPHONE ENCOUNTER
Prior Authorization Infusion/Clinic Administered Request    Location: [facility]  Diagnosis and ICD: Relapsing Remitting Multiple Sclerosis G35  Drug/Therapy: Tysabri 300 mg every 4 weeks     Previously Tried and Failed Therapies: Copaxone    Date of provider note with supporting information: 11/20/19    Urgency (When is the patient scheduled?):     Would you like to include any research articles? na        If yes please call [phone] for further instructions about sending that information

## 2019-12-05 ENCOUNTER — HOSPITAL ENCOUNTER (OUTPATIENT)
Dept: PHYSICAL THERAPY | Facility: CLINIC | Age: 44
Setting detail: THERAPIES SERIES
End: 2019-12-05
Attending: NURSE PRACTITIONER
Payer: OTHER GOVERNMENT

## 2019-12-05 DIAGNOSIS — G35 MULTIPLE SCLEROSIS (H): ICD-10-CM

## 2019-12-05 PROCEDURE — 97112 NEUROMUSCULAR REEDUCATION: CPT | Mod: GP | Performed by: PHYSICAL THERAPIST

## 2019-12-05 PROCEDURE — 97161 PT EVAL LOW COMPLEX 20 MIN: CPT | Mod: GP | Performed by: PHYSICAL THERAPIST

## 2019-12-05 PROCEDURE — 97110 THERAPEUTIC EXERCISES: CPT | Mod: GP | Performed by: PHYSICAL THERAPIST

## 2019-12-05 NOTE — PROGRESS NOTES
12/05/19 0900   Quick Adds   Type of Visit Initial OP PT Evaluation   General Information   Start of Care Date 12/05/19   Referring Physician Yaneth Luna MD   Orders Evaluate and Treat as Indicated   Order Date 11/07/19   Medical Diagnosis Multiple sclerosis   Onset of illness/injury or Date of Surgery 02/14/18   Surgical/Medical history reviewed Yes   Pertinent history of current problem (include personal factors and/or comorbidities that impact the POC) Dx'd with MS in 2018. Recent flare up 2 weeks ago, R side with more weakness and L side tingling, she took predisone for 1 week and now feels that she is closer to baseline. More falls recently, feels like she's tipping. Most recent fall was last Friday. Mainly R sided weakness but occassionally has numbness in L leg. Feels off balance, like she is listing to the side.   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 Yes   Have you fallen and had an injury in the past year? Yes  (Twisted ankles)   Timed Up and Go score (seconds) Not tested, see FGA   Is patient a fall risk? Yes   Abuse Screen (yes response referral indicated)   Feels Unsafe at Home or Work/School no   Feels Threatened by Someone no   Does  "Anyone Try to Keep You From Having Contact with Others or Doing Things Outside Your Home? no   Physical Signs of Abuse Present no   Pain   Pain comments None reported, some \"soreness\" in L ribs from fall   Cognitive Status Examination   Orientation orientation to person, place and time   Level of Consciousness alert   Follows Commands and Answers Questions 100% of the time   Personal Safety and Judgment intact   Memory intact   Integumentary   Integumentary Comments increased swelling in ankles B   Posture   Posture Comments mild forward shoulder posture    Range of Motion (ROM)   ROM Comment heel cord tightness B    Strength   Strength Comments 4/5 hip flexor strength, otherwise, 5/5 in major muscle groups; sit<>stand without UE support from chair    Bed Mobility   Bed Mobility Comments no issues    Transfer Skills   Transfer Comments sit<>stand with and without UEs    Gait   Gait Comments ambulates with adequate B foot clearance, arm swing, forward gaze    Gait Special Tests   Gait Special Tests 25 FOOT TIMED WALK;FUNCTIONAL GAIT ASSESSMENT   Gait Special Tests 25 Foot Timed Walk   Seconds 5.9   Steps 11 Steps   Gait Special Tests Functional Gait Assessment Score out of 30   Score out of 30 23   Comments Difficulty with NBOS and eyes closed portions   Balance Special Tests   Balance Special Tests Sit to stand reps;Single leg stance right;Single leg stance left;Modified CTSIB Conditions;Sharpened Romberg   Balance Special Tests Single Leg Stance Right,   Right, seconds 6 Seconds   Balance Special Tests Single Leg Stance Left   Left, seconds 12 Seconds   Balance Special Tests Modified CTSIB Conditions   Condition 1, seconds 30 Seconds   Condition 2, seconds 30 Seconds   Condition 4, seconds 30 Seconds   Condition 5, seconds 4 Seconds   Modified CTSIB Comments increased sway with eyes closed and on foam, visually dependent with her balance    Balance Special Tests Sharpened Romberg   Seconds 15 Seconds   Balance " Special Tests Sit to Stand Reps in 30 Seconds   Reps in 30 seconds 13   Height 18   Sensory Examination   Sensory Perception Comments Numbness in feet into calves (L>R)   Planned Therapy Interventions   Planned Therapy Interventions IADL retraining;balance training;gait training;ROM;strengthening;stretching;transfer training   Clinical Impression   Criteria for Skilled Therapeutic Interventions Met yes, treatment indicated   PT Diagnosis weakness and balance impairments    Influenced by the following impairments decreased strength, impaired balance, decreased activity tolerance, hx of falls    Functional limitations due to impairments 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, unable to walk dogs on dog path, unable to participate in workout classes with friends    Clinical Presentation Stable/Uncomplicated   Clinical Presentation Rationale Medically stable, PT impairments, clinical judgement   Clinical Decision Making (Complexity) Low complexity   Therapy Frequency other (see comments)  (Follow up in 2-3 weeks)   Predicted Duration of Therapy Intervention (days/wks) Up to 6 weeks   Risk & Benefits of therapy have been explained Yes   Patient, Family & other staff in agreement with plan of care Yes   Clinical Impression Comments Patient presents with weakness and balance impairments secondary to decreased strength, impaired balance, and decreased activity tolerance. This impacts her ability to work safely as a nurse, walk her dogs at the dog park, and participate in exercise classes with friends. Patient would benefit from skilled PT services to increase strength and improve balance strategies.   GOALS   PT Eval Goals 1;2   Goal 1   Goal Identifier FGA   Goal Description Chayo will improve her FGA score to >24/30 in order to demonstrate improved balance to work safely as a nurse with decreased risk of falls.   Target Date 01/15/20   Goal 2   Goal Identifier HEP   Goal Description Chayo will demonstrate  independence and compliance with a strengthening, ROM, and balance HEP to improve her strength and balance in order to participate in exercise classes with her friends.   Target Date 01/15/20   Total Evaluation Time   PT Eval, Low Complexity Minutes (48868) 03

## 2019-12-05 NOTE — PROGRESS NOTES
Functional Gait Assessment (FGA): The FGA assesses postural stability during various walking tasks.   Gait assistive device used: None    Patient Score: 23/30  Scores of <22/30 have been correlated with predicting falls in community-dwelling older adults according to Flavia & Donovan 2010.   Scores of <18/30 have been correlated with increased risk for falls in patients with Parkinsons Disease according to Janak Marquez Zhou et al 2014.  Minimal Detectable Change for patients with acute/chronic stroke = 4.2 according to Zion & Shayyschel 2009  Minimal Detectable Change for patients with vestibular disorder = 8 according to Flavia & Donovan 2010    Assessment (rationale for performing, application to patient s function & care plan): Used to assess dynamic balance during gait. Patient scored a 23/30 which indicates she is at a higher risk of falls. Patient had difficulty walking with a NBOS and with eyes closed. Patient would benefit from skilled PT to improve balance strategies in order to decrease risk of falls.    Minutes billed as physical performance test: 10     12/05/19 0900   Signing Clinician's Name / Credentials   Signing clinician's name / credentials ZULEIKA Bender   Functional Gait Assessment (KATHERINE Alejandro, LALO Brock., et al. (2004))   1. GAIT LEVEL SURFACE 3   2. CHANGE IN GAIT SPEED 3   3. GAIT WITH HORIZONTAL HEAD TURNS 2   4. GAIT WITH VERTICAL HEAD TURNS 2   5. GAIT AND PIVOT TURN 3   6. STEP OVER OBSTACLE 3   7. GAIT WITH NARROW BASE OF SUPPORT 1   8. GAIT WITH EYES CLOSED 1   9. AMBULATING BACKWARDS 2   10. STEPS 3   Total Functional Gait Assessment Score   TOTAL SCORE: (MAXIMUM SCORE 30) 23

## 2020-01-03 NOTE — PROGRESS NOTES
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   Target Date 01/15/20   Date Met      Progress:     Goal Identifier HEP   Goal Description Chayo will demonstrate independence and compliance with a strengthening, ROM, and balance HEP to improve her strength and balance in order to participate in exercise classes with her friends.   Target Date 01/15/20   Date Met      Progress:     Progress Toward Goals:   Patient seen for PT evaluation and treatment provided during the session. She was given strengthening and lengthening exercises as well as balance. She was close to her baseline when seen for PT appointment and called back about three weeks later and cancelled her remaining appointment and she reported she was back to 100% and did not think she needed to come in. Please refer back to PT if appropriate in the future.     Plan:  Discharge from therapy.    Discharge:    Reason for Discharge: unable to re-test and patient only seen for 1 session.    Equipment Issued: none    Discharge Plan: Patient to continue home program.

## 2020-01-16 ENCOUNTER — OFFICE VISIT (OUTPATIENT)
Dept: NEUROLOGY | Facility: CLINIC | Age: 45
End: 2020-01-16
Attending: PSYCHIATRY & NEUROLOGY
Payer: OTHER GOVERNMENT

## 2020-01-16 ENCOUNTER — INFUSION THERAPY VISIT (OUTPATIENT)
Dept: INFUSION THERAPY | Facility: CLINIC | Age: 45
End: 2020-01-16
Payer: OTHER GOVERNMENT

## 2020-01-16 VITALS
SYSTOLIC BLOOD PRESSURE: 135 MMHG | BODY MASS INDEX: 39.89 KG/M2 | HEIGHT: 68 IN | DIASTOLIC BLOOD PRESSURE: 82 MMHG | WEIGHT: 263.2 LBS | HEART RATE: 86 BPM

## 2020-01-16 VITALS
TEMPERATURE: 97.9 F | HEART RATE: 65 BPM | SYSTOLIC BLOOD PRESSURE: 124 MMHG | OXYGEN SATURATION: 100 % | BODY MASS INDEX: 39.84 KG/M2 | RESPIRATION RATE: 16 BRPM | DIASTOLIC BLOOD PRESSURE: 82 MMHG | WEIGHT: 262 LBS

## 2020-01-16 DIAGNOSIS — G35 MULTIPLE SCLEROSIS (H): Primary | ICD-10-CM

## 2020-01-16 PROCEDURE — 99207 ZZC NO CHARGE LOS: CPT

## 2020-01-16 PROCEDURE — G0463 HOSPITAL OUTPT CLINIC VISIT: HCPCS

## 2020-01-16 PROCEDURE — 96413 CHEMO IV INFUSION 1 HR: CPT | Performed by: NURSE PRACTITIONER

## 2020-01-16 RX ORDER — GLATIRAMER ACETATE 40 MG/ML
INJECTION, SOLUTION SUBCUTANEOUS
COMMUNITY
Start: 2019-10-04 | End: 2020-01-16

## 2020-01-16 RX ORDER — HEPARIN SODIUM,PORCINE 10 UNIT/ML
5 VIAL (ML) INTRAVENOUS
Status: CANCELLED | OUTPATIENT
Start: 2020-02-13

## 2020-01-16 RX ORDER — HEPARIN SODIUM (PORCINE) LOCK FLUSH IV SOLN 100 UNIT/ML 100 UNIT/ML
5 SOLUTION INTRAVENOUS
Status: CANCELLED | OUTPATIENT
Start: 2020-02-13

## 2020-01-16 RX ADMIN — Medication 250 ML: at 09:01

## 2020-01-16 ASSESSMENT — PAIN SCALES - GENERAL: PAINLEVEL: NO PAIN (0)

## 2020-01-16 ASSESSMENT — MIFFLIN-ST. JEOR: SCORE: 1892.37

## 2020-01-16 NOTE — PROGRESS NOTES
Infusion Nursing Note:   Chayo Nathan presents today for New Tysabri.    Patient seen by provider today: No   present during visit today: Not Applicable.    Note: Patient orientated to infusion area and staff. All questions answered and patient tolerated first infusion without any issues.  States her sister in law also has MS and was on Tysabri for many months. She is aware of the medications and potential side effects.        Intravenous Access:  Peripheral IV placed.    Treatment Conditions:  Tysabri pre-infusion checklist completed via touch program.      Post Infusion Assessment:  Patient tolerated infusion without incident.  Patient observed for 60 minutes post Tysabri per protocol.  Site patent and intact, free from redness, edema or discomfort.  No evidence of extravasations.  Access discontinued per protocol.       Discharge Plan:   Discharge instructions reviewed with: Patient.  Patient and/or family verbalized understanding of discharge instructions and all questions answered.  Patient discharged in stable condition accompanied by: self.  Departure Mode: Ambulatory.    Pauline Rendon RN

## 2020-01-16 NOTE — LETTER
1/16/2020       RE: Chayo Nathan  [address] [facility] [address]     Dear Colleague,    Thank you for referring your patient, Chayo Nathan, to the [facility] MULTIPLE SCLEROSIS at [facility]. Please see a copy of my visit note below.    THE [facility] CLINIC  FOLLOW UP VISIT           PRINCIPAL NEUROLOGIC DIAGNOSIS: Multiple Sclerosis        HISTORY OF ILLNESS:    This is a follow visit for this 45 year old right handed genetic female  With a history of MS . Who was last seen on  11-19.   At that time the patient was recommended to:  Switch to Tysabri due to one enhancing lesion in the cord     Since last visit she started Tysabri today and feels fine. Today she complains that over the last 2 months she has bowel urgency and 2 accidents (major). She did PT and worked on balance and thinks it has improved, L foot weakness has improved.    Current Symptoms:  1. Bowel urgency      Current Outpatient Prescriptions:  Current Outpatient Medications   Medication     sodium chloride 0.9 % SOLN 100 mL with natalizumab 300 MG/15ML CONC 300 mg     amantadine (SYMMETREL) 100 MG capsule     baclofen (LIORESAL) 10 MG tablet     desogestrel-ethinyl estradiol (DESOGEN) 0.15-30 MG-MCG per tablet     vitamin D3 (CHOLECALCIFEROL) 18997 units (250 mcg) capsule     No current facility-administered medications for this visit.           ALLERGIES       Allergies   Allergen Reactions     Bees Anaphylaxis     Ferumoxytol Difficulty breathing     Penicillin G Rash     Sulfa Drugs Rash           REVIEW OF SYSTEMS:    Comprehensive review of systems otherwise was negative, including constitutional, head and neck, cardiovascular, pulmonary, gastrointestinal, endocrine, urologic, reproductive, rheumatic, hematologic, immunologic, dermatologic, and psychiatric.    Nutritional concerns: None  Driving issues: None   Safety concerns regarding living situations  and safety at home: None  Risk of falls: None  Pain: None    PHYSICAL EXAM:    Hair, skin, nails, and joints were normal. Neck was supple without Lhermitte's phenomenon.  There was no percussion tenderness over the spine.     The patient was alert and oriented to person, place, and time with normal language, attention and concentration, recent and remote memory, praxis, and intellectual function. Affect was normal. The patient did not appear depressed.    Visual acuity:  OD 20/20  OS 20/20    Correction: without    Visual fields were full to confrontation.   Pupils were 3 mm and briskly reactive OU without a relative afferent pupillary defect.  Funduscopic examination was normal without disc edema, erythema, or atrophy.  Extraocular movements: Intact without HORTENCIA  Facial sensation is normal. Normal strength of the muscles of mastication:   Muscles of facial expression were normal  Hearing was normal. Gag reflex and palatal movements were normal. Sternocleidomastoid and trapezius power were normal. Tongue movements were normal. There was no dysarthria.    Motor Examination:   There was no pronator drift.       Motor    Upper      Right Left   Shoulder Abduction 5 5   Elbow Flexion 5 5   Elbow Extension 5 5   Wrist Extension 5 5   Digit Extension 5 5   Digit Flexion 5 5   APB 5 5   Tone 0 0   Lower       Right Left   Hip Flexion 5 5   Knee Extension 5 5   Knee Flexion 5 5   Foot Dorsiflexion 5 4   Foot Plantar Flexion 5 5   EH 5 5   Toe Flexion 5 5   Tone 0 0               Reflexes:     Reflexes       Right  Left   Biceps 1  1   Triceps 1  1   Brachioradialis 1  1   Patellar  brisk  brisk   Achilles 1  2   Babinski down  down         Coordination:     Right Left   RRM Normal Normal   LYUDMILA Normal Normal   FTN Normal Normal   RRM Normal Normal   HKS Normal Normal         Sensory examination:    Light touch:  Intact in all extremities      Coordination and Gait        Gait Normal   Right Left   Romberg Normal  Heel Normal  Normal   Tandem {Normal  Toe Normal Normal                   QUANTITATIVE SCORES:    Visual: 0-Normal  Brainstem: 0-Normal  Pyramidal: 2-minimal disability: patient complains of motor-fatigability or reduced performance in strenuous motor tasks (motor performance grade 1) and/or BMRC grade 4 in one or two muscle groups  Cerebellar: 0-Normal  Sensory: 0-Normal  Bladder/Bowel: 2<-2moderate urinary hesitancy/retention and / or moderate urinary urgency/incontinence and /or moderate bowel disfunction  Cerebral: 0-Normal  Ambulatory: 1-Fully ambulatory    EDSS: 2.5- minimal disability in two FS (two FS grade 2, others 0 or 1)        ASSESSMENT/PLAN:    Impression:    RRMS s/p relapse in November with switch to Tysabri  1st infusion today, tolerated well  JCV indeterminate  Bowel urgency an issue.    Plan:  Cont Tysabri  Repeat MRI's in 6 months to confirm stability from the radiological stand point  Repeat JCV in 6 months  Start probiotic and fiber  Consider re-starting MG (not glycinate)  Check hormone levels with OB-GYN   Hepatic panel in 3 month    Finally I will follow the patient up in 6 month(s) as long as the patient is doing well. I instructed the patient to call or mychart my office with any concerns or questions.    I spent 30 minutes in this visit, with >50% direct patient time spent counseling about prognosis, treatment options, and coordination of care.     My recommendations will be communicated back to the patient's physician(s) by mail.  Follow-up is expected to be with me.    Maryann Hooks MD  Chief, Multiple Sclerosis Division  Department of Neurology  [facility] [place]    (Chart documentation was completed in part with Dragon voice-recognition software. Even though reviewed, some grammatical, spelling, and word errors may remain.)

## 2020-01-16 NOTE — PROGRESS NOTES
THE [facility] MULTIPLE SCLEROSIS CLINIC  FOLLOW UP VISIT           PRINCIPAL NEUROLOGIC DIAGNOSIS: Multiple Sclerosis        HISTORY OF ILLNESS:    This is a follow visit for this 45 year old right handed genetic female  With a history of MS . Who was last seen on  11-19.   At that time the patient was recommended to:  Switch to Tysabri due to one enhancing lesion in the cord     Since last visit she started Tysabri today and feels fine. Today she complains that over the last 2 months she has bowel urgency and 2 accidents (major). She did PT and worked on balance and thinks it has improved, L foot weakness has improved.    Current Symptoms:  1. Bowel urgency      Current Outpatient Prescriptions:  Current Outpatient Medications   Medication     sodium chloride 0.9 % SOLN 100 mL with natalizumab 300 MG/15ML CONC 300 mg     amantadine (SYMMETREL) 100 MG capsule     baclofen (LIORESAL) 10 MG tablet     desogestrel-ethinyl estradiol (DESOGEN) 0.15-30 MG-MCG per tablet     vitamin D3 (CHOLECALCIFEROL) 41952 units (250 mcg) capsule     No current facility-administered medications for this visit.           ALLERGIES       Allergies   Allergen Reactions     Bees Anaphylaxis     Ferumoxytol Difficulty breathing     Penicillin G Rash     Sulfa Drugs Rash           REVIEW OF SYSTEMS:    Comprehensive review of systems otherwise was negative, including constitutional, head and neck, cardiovascular, pulmonary, gastrointestinal, endocrine, urologic, reproductive, rheumatic, hematologic, immunologic, dermatologic, and psychiatric.    Nutritional concerns: None  Driving issues: None   Safety concerns regarding living situations and safety at home: None  Risk of falls: None  Pain: None    PHYSICAL EXAM:    Hair, skin, nails, and joints were normal. Neck was supple without Lhermitte's phenomenon.  There was no percussion tenderness over the spine.     The patient was alert and oriented to person, place, and time  with normal language, attention and concentration, recent and remote memory, praxis, and intellectual function. Affect was normal. The patient did not appear depressed.    Visual acuity:  OD 20/20  OS 20/20    Correction: without    Visual fields were full to confrontation.   Pupils were 3 mm and briskly reactive OU without a relative afferent pupillary defect.  Funduscopic examination was normal without disc edema, erythema, or atrophy.  Extraocular movements: Intact without HORTENCIA  Facial sensation is normal. Normal strength of the muscles of mastication:   Muscles of facial expression were normal  Hearing was normal. Gag reflex and palatal movements were normal. Sternocleidomastoid and trapezius power were normal. Tongue movements were normal. There was no dysarthria.    Motor Examination:   There was no pronator drift.       Motor    Upper      Right Left   Shoulder Abduction 5 5   Elbow Flexion 5 5   Elbow Extension 5 5   Wrist Extension 5 5   Digit Extension 5 5   Digit Flexion 5 5   APB 5 5   Tone 0 0   Lower       Right Left   Hip Flexion 5 5   Knee Extension 5 5   Knee Flexion 5 5   Foot Dorsiflexion 5 4   Foot Plantar Flexion 5 5   EH 5 5   Toe Flexion 5 5   Tone 0 0               Reflexes:     Reflexes       Right  Left   Biceps 1  1   Triceps 1  1   Brachioradialis 1  1   Patellar  brisk  brisk   Achilles 1  2   Babinski down  down         Coordination:     Right Left   RRM Normal Normal   LYUDMILA Normal Normal   FTN Normal Normal   RRM Normal Normal   HKS Normal Normal         Sensory examination:    Light touch:  Intact in all extremities      Coordination and Gait        Gait Normal   Right Left   Romberg Normal  Heel Normal Normal   Tandem {Normal  Toe Normal Normal                   QUANTITATIVE SCORES:    Visual: 0-Normal  Brainstem: 0-Normal  Pyramidal: 2-minimal disability: patient complains of motor-fatigability or reduced performance in strenuous motor tasks (motor performance grade 1) and/or BMRC  grade 4 in one or two muscle groups  Cerebellar: 0-Normal  Sensory: 0-Normal  Bladder/Bowel: 2<-2moderate urinary hesitancy/retention and / or moderate urinary urgency/incontinence and /or moderate bowel disfunction  Cerebral: 0-Normal  Ambulatory: 1-Fully ambulatory    EDSS: 2.5- minimal disability in two FS (two FS grade 2, others 0 or 1)        ASSESSMENT/PLAN:    Impression:    RRMS s/p relapse in November with switch to Tysabri  1st infusion today, tolerated well  JCV indeterminate  Bowel urgency an issue.    Plan:  Cont Tysabri  Repeat MRI's in 6 months to confirm stability from the radiological stand point  Repeat JCV in 6 months  Start probiotic and fiber  Consider re-starting MG (not glycinate)  Check hormone levels with OB-GYN   Hepatic panel in 3 month    Finally I will follow the patient up in 6 month(s) as long as the patient is doing well. I instructed the patient to call or mychart my office with any concerns or questions.    I spent 30 minutes in this visit, with >50% direct patient time spent counseling about prognosis, treatment options, and coordination of care.     My recommendations will be communicated back to the patient's physician(s) by mail.  Follow-up is expected to be with me.      Maryann Hooks MD  Chief, Multiple Sclerosis Division  Department of Neurology  [facility] [place]note 2[facility] [place]      (Chart documentation was completed in part with Dragon voice-recognition software. Even though reviewed, some grammatical, spelling, and word errors may remain.)

## 2020-01-16 NOTE — PATIENT INSTRUCTIONS
Impression:    RRMS s/p relapse in November with switch to Tysabri  1st infusion today, tolerated well  JCV indeterminate  Bowel urgency an issue.    Plan:  Cont Tysabri  Repeat MRI's in 6 months to confirm stability from the radiological stand point  Repeat JCV in 6 months  Start probiotic and fiber  Consider re-starting MG (not glycinate)  Check hormone levels with OB-GYN   Hepatic panel in 3 month

## 2020-02-14 ENCOUNTER — INFUSION THERAPY VISIT (OUTPATIENT)
Dept: INFUSION THERAPY | Facility: CLINIC | Age: 45
End: 2020-02-14
Payer: OTHER GOVERNMENT

## 2020-02-14 VITALS
OXYGEN SATURATION: 100 % | HEART RATE: 86 BPM | SYSTOLIC BLOOD PRESSURE: 135 MMHG | BODY MASS INDEX: 41.27 KG/M2 | TEMPERATURE: 98.5 F | WEIGHT: 271.4 LBS | DIASTOLIC BLOOD PRESSURE: 75 MMHG

## 2020-02-14 DIAGNOSIS — G35 MULTIPLE SCLEROSIS (H): Primary | ICD-10-CM

## 2020-02-14 PROCEDURE — 96413 CHEMO IV INFUSION 1 HR: CPT | Performed by: NURSE PRACTITIONER

## 2020-02-14 PROCEDURE — 99207 ZZC NO CHARGE NURSE ONLY: CPT

## 2020-02-14 RX ORDER — HEPARIN SODIUM (PORCINE) LOCK FLUSH IV SOLN 100 UNIT/ML 100 UNIT/ML
5 SOLUTION INTRAVENOUS
Status: CANCELLED | OUTPATIENT
Start: 2020-03-12

## 2020-02-14 RX ORDER — HEPARIN SODIUM,PORCINE 10 UNIT/ML
5 VIAL (ML) INTRAVENOUS
Status: CANCELLED | OUTPATIENT
Start: 2020-03-12

## 2020-02-14 RX ADMIN — Medication 250 ML: at 13:57

## 2020-02-14 ASSESSMENT — PAIN SCALES - GENERAL: PAINLEVEL: NO PAIN (0)

## 2020-02-14 NOTE — PROGRESS NOTES
Infusion Nursing Note:  Chayo Nathan presents today for Tysabri.    Patient seen by provider today: No   present during visit today: Not Applicable.    Note: Pt denies any new medical complaints, see flow sheet for assessment.  States she tolerated her 1st tysabri w/o difficulty last infusion.      Intravenous Access:  Peripheral IV placed.    Treatment Conditions:  Tysabri pre-infusion checklist completed via touch program.      Post Infusion Assessment:  Patient tolerated infusion without incident.  Patient observed for 60 minutes post Tysabri per protocol.  Blood return noted pre and post infusion.  Site patent and intact, free from redness, edema or discomfort.  No evidence of extravasations.  Access discontinued per protocol.       Discharge Plan:   Patient discharged in stable condition accompanied by: self.  Departure Mode: Ambulatory.  Pt will RTC 3/13/20 for Tysabri.    Ousmane Castellano RN

## 2020-03-11 ENCOUNTER — HEALTH MAINTENANCE LETTER (OUTPATIENT)
Age: 45
End: 2020-03-11

## 2020-03-13 ENCOUNTER — INFUSION THERAPY VISIT (OUTPATIENT)
Dept: INFUSION THERAPY | Facility: CLINIC | Age: 45
End: 2020-03-13
Payer: OTHER GOVERNMENT

## 2020-03-13 VITALS
TEMPERATURE: 98 F | SYSTOLIC BLOOD PRESSURE: 119 MMHG | RESPIRATION RATE: 16 BRPM | BODY MASS INDEX: 40.76 KG/M2 | OXYGEN SATURATION: 97 % | WEIGHT: 268.1 LBS | HEART RATE: 72 BPM | DIASTOLIC BLOOD PRESSURE: 77 MMHG

## 2020-03-13 DIAGNOSIS — G35 MULTIPLE SCLEROSIS (H): Primary | ICD-10-CM

## 2020-03-13 PROCEDURE — 96413 CHEMO IV INFUSION 1 HR: CPT | Performed by: PHYSICIAN ASSISTANT

## 2020-03-13 PROCEDURE — 99207 ZZC NO CHARGE LOS: CPT

## 2020-03-13 RX ORDER — HEPARIN SODIUM,PORCINE 10 UNIT/ML
5 VIAL (ML) INTRAVENOUS
Status: CANCELLED | OUTPATIENT
Start: 2020-04-10

## 2020-03-13 RX ORDER — HEPARIN SODIUM (PORCINE) LOCK FLUSH IV SOLN 100 UNIT/ML 100 UNIT/ML
5 SOLUTION INTRAVENOUS
Status: CANCELLED | OUTPATIENT
Start: 2020-04-10

## 2020-03-13 RX ADMIN — Medication 250 ML: at 10:52

## 2020-03-13 ASSESSMENT — PAIN SCALES - GENERAL: PAINLEVEL: NO PAIN (0)

## 2020-03-13 NOTE — PROGRESS NOTES
Infusion Nursing Note:  Chayo CARMICHAEL Jac presents today for Tysabri.    Patient seen by provider today: No   present during visit today: Not Applicable.    Note: N/A.    Intravenous Access:  Peripheral IV placed.    Treatment Conditions:  Tysabri pre-infusion checklist completed via touch program.      Post Infusion Assessment:  Patient tolerated infusion without incident.  Patient observed for 60 minutes post Tysabri per protocol.  Site patent and intact, free from redness, edema or discomfort.  No evidence of extravasations.  Access discontinued per protocol.       Discharge Plan:   Patient directed to scheduling.  Patient discharged in stable condition accompanied by: self.  Departure Mode: Ambulatory.    Shirley Crawford RN

## 2020-04-15 ENCOUNTER — INFUSION THERAPY VISIT (OUTPATIENT)
Dept: INFUSION THERAPY | Facility: CLINIC | Age: 45
End: 2020-04-15
Payer: OTHER GOVERNMENT

## 2020-04-15 VITALS
RESPIRATION RATE: 18 BRPM | OXYGEN SATURATION: 100 % | DIASTOLIC BLOOD PRESSURE: 65 MMHG | TEMPERATURE: 97.1 F | BODY MASS INDEX: 40.54 KG/M2 | WEIGHT: 266.6 LBS | SYSTOLIC BLOOD PRESSURE: 112 MMHG | HEART RATE: 55 BPM

## 2020-04-15 DIAGNOSIS — G35 MULTIPLE SCLEROSIS (H): Primary | ICD-10-CM

## 2020-04-15 LAB
ALBUMIN SERPL-MCNC: 3.6 G/DL (ref 3.4–5)
ALP SERPL-CCNC: 82 U/L (ref 40–150)
ALT SERPL W P-5'-P-CCNC: 25 U/L (ref 0–50)
AST SERPL W P-5'-P-CCNC: 25 U/L (ref 0–45)
BILIRUB DIRECT SERPL-MCNC: <0.1 MG/DL (ref 0–0.2)
BILIRUB SERPL-MCNC: 0.2 MG/DL (ref 0.2–1.3)
PROT SERPL-MCNC: 6.9 G/DL (ref 6.8–8.8)

## 2020-04-15 PROCEDURE — 96413 CHEMO IV INFUSION 1 HR: CPT | Performed by: INTERNAL MEDICINE

## 2020-04-15 PROCEDURE — 80076 HEPATIC FUNCTION PANEL: CPT | Performed by: INTERNAL MEDICINE

## 2020-04-15 PROCEDURE — 99207 ZZC NO CHARGE LOS: CPT | Performed by: INTERNAL MEDICINE

## 2020-04-15 RX ORDER — HEPARIN SODIUM,PORCINE 10 UNIT/ML
5 VIAL (ML) INTRAVENOUS
Status: CANCELLED | OUTPATIENT
Start: 2020-05-08

## 2020-04-15 RX ORDER — HEPARIN SODIUM (PORCINE) LOCK FLUSH IV SOLN 100 UNIT/ML 100 UNIT/ML
5 SOLUTION INTRAVENOUS
Status: CANCELLED | OUTPATIENT
Start: 2020-05-08

## 2020-04-15 RX ORDER — MULTIVIT WITH MINERALS/LUTEIN
1000 TABLET ORAL DAILY
COMMUNITY

## 2020-04-15 RX ADMIN — Medication 250 ML: at 14:24

## 2020-04-15 ASSESSMENT — PAIN SCALES - GENERAL: PAINLEVEL: NO PAIN (0)

## 2020-04-15 NOTE — PROGRESS NOTES
Infusion Nursing Note:  Chayo Nathan presents today for Tysabri.    Patient seen by provider today: No   present during visit today: Not Applicable.    Note: No new symptoms or concerns. Denies pain, VSS.     Intravenous Access:  Peripheral IV placed.    Treatment Conditions:  Tysabri pre-infusion checklist completed via touch program.      Post Infusion Assessment:  Patient tolerated infusion without incident.  Patient observed for 60 minutes post Tysabri per protocol.  Site patent and intact, free from redness, edema or discomfort.  No evidence of extravasations.  Peripheral IV access discontinued per protocol.       Discharge Plan:   Patient and/or family verbalized understanding of discharge instructions and all questions answered. Will return 5/14 for next infusion.  Patient discharged in stable condition accompanied by: self.  Departure Mode: Ambulatory.    Meena Jennings RN

## 2020-05-14 ENCOUNTER — INFUSION THERAPY VISIT (OUTPATIENT)
Dept: INFUSION THERAPY | Facility: CLINIC | Age: 45
End: 2020-05-14
Payer: OTHER GOVERNMENT

## 2020-05-14 VITALS
HEART RATE: 79 BPM | TEMPERATURE: 97.8 F | SYSTOLIC BLOOD PRESSURE: 119 MMHG | OXYGEN SATURATION: 99 % | WEIGHT: 267.8 LBS | RESPIRATION RATE: 18 BRPM | DIASTOLIC BLOOD PRESSURE: 71 MMHG | BODY MASS INDEX: 39.66 KG/M2 | HEIGHT: 69 IN

## 2020-05-14 DIAGNOSIS — G35 MULTIPLE SCLEROSIS (H): Primary | ICD-10-CM

## 2020-05-14 PROCEDURE — 96413 CHEMO IV INFUSION 1 HR: CPT | Performed by: NURSE PRACTITIONER

## 2020-05-14 PROCEDURE — 99207 ZZC NO CHARGE LOS: CPT

## 2020-05-14 RX ORDER — HEPARIN SODIUM,PORCINE 10 UNIT/ML
5 VIAL (ML) INTRAVENOUS
Status: CANCELLED | OUTPATIENT
Start: 2020-06-10

## 2020-05-14 RX ORDER — HEPARIN SODIUM (PORCINE) LOCK FLUSH IV SOLN 100 UNIT/ML 100 UNIT/ML
5 SOLUTION INTRAVENOUS
Status: CANCELLED | OUTPATIENT
Start: 2020-06-10

## 2020-05-14 RX ADMIN — Medication 250 ML: at 11:52

## 2020-05-14 ASSESSMENT — MIFFLIN-ST. JEOR: SCORE: 1929.11

## 2020-05-14 ASSESSMENT — PAIN SCALES - GENERAL: PAINLEVEL: NO PAIN (0)

## 2020-05-14 NOTE — PATIENT INSTRUCTIONS
Pt to return on 06/11/20 for Tysabri. Copies of medication list and upcoming appointments given prior to discharge.

## 2020-05-14 NOTE — PROGRESS NOTES
Infusion Nursing Note:  Chayo Nathan presents today for Tysabri.    Patient seen by provider today: No   present during visit today: Not Applicable.    Note: Patient has no new complaints today, tolerates infusion well.    Intravenous Access:  Peripheral IV placed.    Treatment Conditions:  Tysabri pre-infusion checklist completed via touch program.      Post Infusion Assessment:  Patient tolerated infusion without incident.  Patient observed for 60 minutes post Tysabri per protocol.  Blood return noted pre and post infusion.  Site patent and intact, free from redness, edema or discomfort.  No evidence of extravasations.  Access discontinued per protocol.       Discharge Plan:   Discharge instructions reviewed with: Patient.  Patient and/or family verbalized understanding of discharge instructions and all questions answered.  Patient discharged in stable condition accompanied by: self.  Departure Mode: Ambulatory.    Mirna Martinez RN

## 2020-06-11 ENCOUNTER — INFUSION THERAPY VISIT (OUTPATIENT)
Dept: INFUSION THERAPY | Facility: CLINIC | Age: 45
End: 2020-06-11
Payer: OTHER GOVERNMENT

## 2020-06-11 VITALS
TEMPERATURE: 98.4 F | RESPIRATION RATE: 16 BRPM | OXYGEN SATURATION: 99 % | BODY MASS INDEX: 39.13 KG/M2 | WEIGHT: 265 LBS | DIASTOLIC BLOOD PRESSURE: 79 MMHG | HEART RATE: 73 BPM | SYSTOLIC BLOOD PRESSURE: 124 MMHG

## 2020-06-11 DIAGNOSIS — G35 MULTIPLE SCLEROSIS (H): Primary | ICD-10-CM

## 2020-06-11 PROCEDURE — 96413 CHEMO IV INFUSION 1 HR: CPT | Performed by: NURSE PRACTITIONER

## 2020-06-11 PROCEDURE — 99207 ZZC NO CHARGE NURSE ONLY: CPT

## 2020-06-11 RX ORDER — HEPARIN SODIUM,PORCINE 10 UNIT/ML
5 VIAL (ML) INTRAVENOUS
Status: CANCELLED | OUTPATIENT
Start: 2020-07-09

## 2020-06-11 RX ORDER — HEPARIN SODIUM (PORCINE) LOCK FLUSH IV SOLN 100 UNIT/ML 100 UNIT/ML
5 SOLUTION INTRAVENOUS
Status: CANCELLED | OUTPATIENT
Start: 2020-07-09

## 2020-06-11 RX ADMIN — Medication 250 ML: at 08:41

## 2020-06-11 ASSESSMENT — PAIN SCALES - GENERAL: PAINLEVEL: NO PAIN (0)

## 2020-06-11 NOTE — PROGRESS NOTES
Infusion Nursing Note:  Chayo Nathan presents today for Tysabri.    Patient seen by provider today: No   present during visit today: Not Applicable.    Note: Patient declined observation today, she has benadryl and an epi-pen at home and knows what to watch out for.    Intravenous Access:  Peripheral IV placed.    Treatment Conditions:  Tysabri pre-infusion checklist completed via touch program.    Post Infusion Assessment:  Patient tolerated infusion without incident.  Blood return noted pre and post infusion.  Site patent and intact, free from redness, edema or discomfort.  No evidence of extravasations.  Access discontinued per protocol.       Discharge Plan:   Patient will return in 4 weeks for next appointment.   Patient discharged in stable condition accompanied by: self.  Departure Mode: Ambulatory.    Daisy Camarillo RN-BSN, PHN, OCN  [facility]

## 2020-07-14 ENCOUNTER — ANCILLARY PROCEDURE (OUTPATIENT)
Dept: MRI IMAGING | Facility: CLINIC | Age: 45
End: 2020-07-14
Attending: PSYCHIATRY & NEUROLOGY
Payer: OTHER GOVERNMENT

## 2020-07-14 DIAGNOSIS — G35 MULTIPLE SCLEROSIS (H): ICD-10-CM

## 2020-07-14 RX ORDER — GADOBUTROL 604.72 MG/ML
15 INJECTION INTRAVENOUS ONCE
Status: COMPLETED | OUTPATIENT
Start: 2020-07-14 | End: 2020-07-14

## 2020-07-14 RX ADMIN — GADOBUTROL 12 ML: 604.72 INJECTION INTRAVENOUS at 14:16

## 2020-07-14 NOTE — DISCHARGE INSTRUCTIONS
MRI Contrast Discharge Instructions    The IV contrast you received today will pass out of your body in your  urine. This will happen in the next 24 hours. You will not feel this process.  Your urine will not change color.    Drink at least 4 extra glasses of water or juice today (unless your doctor  has restricted your fluids). This reduces the stress on your kidneys.  You may take your regular medicines.    If you are on dialysis: It is best to have dialysis today.    If you have a reaction: Most reactions happen right away. If you have  any new symptoms after leaving the hospital (such as hives or swelling),  call your hospital at the correct number below. Or call your family doctor.  If you have breathing distress or wheezing, call 911.    Special instructions: ***    I have read and understand the above information.    Signature:______________________________________ Date:___________    Staff:__________________________________________ Date:___________     Time:__________    [facility] Departments:    ___[place]: [phone]  ___[facility]: [phone]  ___[place]: [phone] ___[facility]: [phone]  ___[facility]: [phone]  ___[facility]: [phone]  ___[phone]  ___[facility]: [phone]  ___[phone]

## 2020-07-14 NOTE — DISCHARGE INSTRUCTIONS
MRI Contrast Discharge Instructions    The IV contrast you received today will pass out of your body in your  urine. This will happen in the next 24 hours. You will not feel this process.  Your urine will not change color.    Drink at least 4 extra glasses of water or juice today (unless your doctor  has restricted your fluids). This reduces the stress on your kidneys.  You may take your regular medicines.    If you are on dialysis: It is best to have dialysis today.    If you have a reaction: Most reactions happen right away. If you have  any new symptoms after leaving the hospital (such as hives or swelling),  call your hospital at the correct number below. Or call your family doctor.  If you have breathing distress or wheezing, call 911.    Special instructions: ***    I have read and understand the above information.    Signature:______________________________________ Date:___________    Staff:__________________________________________ Date:___________     Time:__________    [place] [facility] Departments:    ___[place]: [phone]  ___[facility]: [phone]  ___[place]: [phone] ___[facility]: [phone]  ___[facility]: [phone]  ___[facility]: [phone]  ___[phone]  ___[facility]: [phone]  ___[phone]

## 2020-07-16 ENCOUNTER — TELEPHONE (OUTPATIENT)
Dept: NEUROLOGY | Facility: CLINIC | Age: 45
End: 2020-07-16

## 2020-07-16 ENCOUNTER — VIRTUAL VISIT (OUTPATIENT)
Dept: NEUROLOGY | Facility: CLINIC | Age: 45
End: 2020-07-16
Attending: PSYCHIATRY & NEUROLOGY
Payer: OTHER GOVERNMENT

## 2020-07-16 DIAGNOSIS — G35 MULTIPLE SCLEROSIS (H): Primary | ICD-10-CM

## 2020-07-16 NOTE — PROGRESS NOTES
"Chayo Nathan is a 45 year old female who is being evaluated via a billable video visit.      The patient has been notified of following:     \"This video visit will be conducted via a call between you and your physician/provider. We have found that certain health care needs can be provided without the need for an in-person physical exam.  This service lets us provide the care you need with a video conversation.  If a prescription is necessary we can send it directly to your pharmacy.  If lab work is needed we can place an order for that and you can then stop by our lab to have the test done at a later time.    Video visits are billed at different rates depending on your insurance coverage.  Please reach out to your insurance provider with any questions.    If during the course of the call the physician/provider feels a video visit is not appropriate, you will not be charged for this service.\"    Patient has given verbal consent for Video visit? Yes  How would you like to obtain your AVS? MyChart  If you are dropped from the video visit, the video invite should be resent to: Other e-mail: [employer]  Will anyone else be joining your video visit? No        Video-Visit Details    Type of service:  Video Visit    Video Start Time: 1:37 PM  Video End Time: 1:57    Originating Location (pt. Location): Home    Distant Location (provider location):  [facility] MULTIPLE SCLEROSIS     Platform used for Video Visit: Other: amwell and doximity     THE [facility] MULTIPLE SCLEROSIS CLINIC  FOLLOW UP VISIT           PRINCIPAL NEUROLOGIC DIAGNOSIS: Multiple Sclerosis          HISTORY OF ILLNESS:    This is a follow visit for this 45 year old right handed genetic female  With a history of MS. Who was last seen on  1-16-20.   At that time the patient was recommended to:    Cont Tysabri  Repeat MRI's in 6 months to confirm disease stability from the radiological stand point  Repeat JCV in 6 months  Start probiotic and " fiber  Consider re-starting MG (not glycinate)  Check hormone levels with OB-GYN   Hepatic panel in 3 month     Since last visit:     Current Symptoms:  1. None MS related  2. menometrorrhagia         Current Outpatient Prescriptions:  Current Outpatient Medications   Medication     baclofen (LIORESAL) 10 MG tablet     sodium chloride 0.9 % SOLN 100 mL with natalizumab 300 MG/15ML CONC 300 mg     vitamin C (ASCORBIC ACID) 1000 MG TABS     vitamin D3 (CHOLECALCIFEROL) 29594 units (250 mcg) capsule     No current facility-administered medications for this visit.           ALLERGIES       Allergies   Allergen Reactions     Bees Anaphylaxis     Ferumoxytol Difficulty breathing     Penicillin G Rash     Sulfa Drugs Rash           REVIEW OF SYSTEMS:    Comprehensive review of systems otherwise was negative, including constitutional, head and neck, cardiovascular, pulmonary, gastrointestinal, endocrine, urologic, reproductive, rheumatic, hematologic, immunologic, dermatologic, and psychiatric.    Nutritional concerns: None  Driving issues: None   Safety concerns regarding living situations and safety at home: None  Risk of falls: None  Pain: None      REVIEW OF IMAGING STUDIES:    I personally reviewed the following images:    MRI of the brain cervical and thoracic spine with and without contrast from July 14, 2020 were compared against the same images obtained November 2019.  The current images were obtained in a 1.5 Monika magnet and the previous ones in a 3 Monika magnet so it is possible that some of the lesions look less conspicuous currently but that is likely related to the magnet strength and the signal-to-noise ratio.    There are no new or enlarged nor enhancements lesions compared to the previous MRI so she is considered stable from the radiological standpoint.    ASSESSMENT:    Relapsing remitting multiple sclerosis, status post 6 infusions of Tysabri with very good clinical response.  The patient states that she  feels better than ever, has more energy, and even her thoughts phenomenons have decreased in intensity since being on Tysabri, especially when at the beach.    PLAN:    Plan is to repeat a CBC, CMP and LILIANE virus as well as iron studies due to menometrorrhagia.    Finally I will follow the patient up in 6 month(s) as long as the patient is doing well. I instructed the patient to call or mychart my office with any concerns or questions.    I spent 20 minutes in this visit, with >50% direct patient time spent counseling about prognosis, treatment options, and coordination of care.     My recommendations will be communicated back to the patient's physician(s) by mail.  Follow-up is expected to be with me.      Maryann Hooks MD  Chief, Multiple Sclerosis Division  Department of Neurology  [facility] [place]

## 2020-07-16 NOTE — LETTER
7/16/2020       RE: Chayo Nathan  [address] [place] [address]     Dear Colleague,    Thank you for referring your patient, Chayo Nathan, to the [facility] MULTIPLE SCLEROSIS at [facility]. Please see a copy of my visit note below.    Chayo Nathan is a 45 year old female who is being evaluated via a billable video visit.            Video-Visit Details    Type of service:  Video Visit    Video Start Time: 1:37 PM  Video End Time: 1:57    Originating Location (pt. Location): Home    Distant Location (provider location):   [facility] MULTIPLE SCLEROSIS     Platform used for Video Visit: Other: amwell and doximity     THE [facility] MULTIPLE SCLEROSIS CLINIC  FOLLOW UP VISIT           PRINCIPAL NEUROLOGIC DIAGNOSIS: Multiple Sclerosis          HISTORY OF ILLNESS:    This is a follow visit for this 45 year old right handed genetic female  With a history of MS. Who was last seen on  1-16-20.   At that time the patient was recommended to:    Cont Tysabri  Repeat MRI's in 6 months to confirm disease stability from the radiological stand point  Repeat JCV in 6 months  Start probiotic and fiber  Consider re-starting MG (not glycinate)  Check hormone levels with OB-GYN   Hepatic panel in 3 month     Since last visit:     Current Symptoms:  1. None MS related  2. menometrorrhagia         Current Outpatient Prescriptions:  Current Outpatient Medications   Medication     baclofen (LIORESAL) 10 MG tablet     sodium chloride 0.9 % SOLN 100 mL with natalizumab 300 MG/15ML CONC 300 mg     vitamin C (ASCORBIC ACID) 1000 MG TABS     vitamin D3 (CHOLECALCIFEROL) 50542 units (250 mcg) capsule     No current facility-administered medications for this visit.           ALLERGIES       Allergies   Allergen Reactions     Bees Anaphylaxis     Ferumoxytol Difficulty breathing     Penicillin G Rash     Sulfa Drugs Rash           REVIEW OF SYSTEMS:    Comprehensive review of  systems otherwise was negative, including constitutional, head and neck, cardiovascular, pulmonary, gastrointestinal, endocrine, urologic, reproductive, rheumatic, hematologic, immunologic, dermatologic, and psychiatric.    Nutritional concerns: None  Driving issues: None   Safety concerns regarding living situations and safety at home: None  Risk of falls: None  Pain: None      REVIEW OF IMAGING STUDIES:    I personally reviewed the following images:    MRI of the brain cervical and thoracic spine with and without contrast from July 14, 2020 were compared against the same images obtained November 2019.  The current images were obtained in a 1.5 Monika magnet and the previous ones in a 3 Monika magnet so it is possible that some of the lesions look less conspicuous currently but that is likely related to the magnet strength and the signal-to-noise ratio.    There are no new or enlarged nor enhancements lesions compared to the previous MRI so she is considered stable from the radiological standpoint.    ASSESSMENT:    Relapsing remitting multiple sclerosis, status post 6 infusions of Tysabri with very good clinical response.  The patient states that she feels better than ever, has more energy, and even her thoughts phenomenons have decreased in intensity since being on Tysabri, especially when at the beach.    PLAN:    Plan is to repeat a CBC, CMP and LILIANE virus as well as iron studies due to menometrorrhagia.    Finally I will follow the patient up in 6 month(s) as long as the patient is doing well. I instructed the patient to call or mychart my office with any concerns or questions.    I spent 20 minutes in this visit, with >50% direct patient time spent counseling about prognosis, treatment options, and coordination of care.     My recommendations will be communicated back to the patient's physician(s) by mail.  Follow-up is expected to be with me.      Maryann Hooks MD  Chief, Multiple Sclerosis Division  Department  of Neurology  [facility]

## 2020-07-16 NOTE — TELEPHONE ENCOUNTER
Patient would like to have her lab drawn when she has her next infusion done which is scheduled for July 21st at [facility].    She would like to have her JCV Virus- CBC and liver function test drawn along with other labs the provider feels is appropriate at this time    Please advise    Ludy Bustillos MA

## 2020-07-21 ENCOUNTER — INFUSION THERAPY VISIT (OUTPATIENT)
Dept: INFUSION THERAPY | Facility: CLINIC | Age: 45
End: 2020-07-21
Payer: OTHER GOVERNMENT

## 2020-07-21 VITALS
HEART RATE: 70 BPM | OXYGEN SATURATION: 100 % | DIASTOLIC BLOOD PRESSURE: 84 MMHG | SYSTOLIC BLOOD PRESSURE: 125 MMHG | TEMPERATURE: 98.2 F | BODY MASS INDEX: 39.72 KG/M2 | WEIGHT: 269 LBS | RESPIRATION RATE: 18 BRPM

## 2020-07-21 DIAGNOSIS — G35 MULTIPLE SCLEROSIS (H): Primary | ICD-10-CM

## 2020-07-21 DIAGNOSIS — G35 MULTIPLE SCLEROSIS (H): ICD-10-CM

## 2020-07-21 LAB
ALBUMIN SERPL-MCNC: 3.4 G/DL (ref 3.4–5)
ALP SERPL-CCNC: 82 U/L (ref 40–150)
ALT SERPL W P-5'-P-CCNC: 24 U/L (ref 0–50)
ANION GAP SERPL CALCULATED.3IONS-SCNC: 5 MMOL/L (ref 3–14)
AST SERPL W P-5'-P-CCNC: 20 U/L (ref 0–45)
BASOPHILS # BLD AUTO: 0.1 10E9/L (ref 0–0.2)
BASOPHILS NFR BLD AUTO: 1.1 %
BILIRUB SERPL-MCNC: 0.2 MG/DL (ref 0.2–1.3)
BUN SERPL-MCNC: 13 MG/DL (ref 7–30)
CALCIUM SERPL-MCNC: 8.7 MG/DL (ref 8.5–10.1)
CHLORIDE SERPL-SCNC: 110 MMOL/L (ref 94–109)
CO2 SERPL-SCNC: 26 MMOL/L (ref 20–32)
CREAT SERPL-MCNC: 0.65 MG/DL (ref 0.52–1.04)
DIFFERENTIAL METHOD BLD: ABNORMAL
EOSINOPHIL # BLD AUTO: 0.4 10E9/L (ref 0–0.7)
EOSINOPHIL NFR BLD AUTO: 4.6 %
ERYTHROCYTE [DISTWIDTH] IN BLOOD BY AUTOMATED COUNT: 17.6 % (ref 10–15)
GFR SERPL CREATININE-BSD FRML MDRD: >90 ML/MIN/{1.73_M2}
GLUCOSE SERPL-MCNC: 100 MG/DL (ref 70–99)
HCT VFR BLD AUTO: 30.1 % (ref 35–47)
HGB BLD-MCNC: 8.7 G/DL (ref 11.7–15.7)
IMM GRANULOCYTES # BLD: 0.1 10E9/L (ref 0–0.4)
IMM GRANULOCYTES NFR BLD: 0.5 %
IRON SERPL-MCNC: 13 UG/DL (ref 35–180)
LYMPHOCYTES # BLD AUTO: 3.8 10E9/L (ref 0.8–5.3)
LYMPHOCYTES NFR BLD AUTO: 41.2 %
MCH RBC QN AUTO: 18.9 PG (ref 26.5–33)
MCHC RBC AUTO-ENTMCNC: 28.9 G/DL (ref 31.5–36.5)
MCV RBC AUTO: 65 FL (ref 78–100)
MONOCYTES # BLD AUTO: 1.1 10E9/L (ref 0–1.3)
MONOCYTES NFR BLD AUTO: 11.6 %
NEUTROPHILS # BLD AUTO: 3.8 10E9/L (ref 1.6–8.3)
NEUTROPHILS NFR BLD AUTO: 41 %
PLATELET # BLD AUTO: 316 10E9/L (ref 150–450)
POTASSIUM SERPL-SCNC: 4 MMOL/L (ref 3.4–5.3)
PROT SERPL-MCNC: 6.7 G/DL (ref 6.8–8.8)
RBC # BLD AUTO: 4.6 10E12/L (ref 3.8–5.2)
SODIUM SERPL-SCNC: 141 MMOL/L (ref 133–144)
WBC # BLD AUTO: 9.2 10E9/L (ref 4–11)

## 2020-07-21 PROCEDURE — 40000975 ZZHCL STATISTIC JC VIR AB INDEX INHIB: Mod: 90 | Performed by: PSYCHIATRY & NEUROLOGY

## 2020-07-21 PROCEDURE — 96413 CHEMO IV INFUSION 1 HR: CPT | Performed by: NURSE PRACTITIONER

## 2020-07-21 PROCEDURE — 99000 SPECIMEN HANDLING OFFICE-LAB: CPT | Performed by: PSYCHIATRY & NEUROLOGY

## 2020-07-21 PROCEDURE — 80053 COMPREHEN METABOLIC PANEL: CPT | Performed by: PSYCHIATRY & NEUROLOGY

## 2020-07-21 PROCEDURE — 36415 COLL VENOUS BLD VENIPUNCTURE: CPT | Performed by: PSYCHIATRY & NEUROLOGY

## 2020-07-21 PROCEDURE — 99207 ZZC NO CHARGE LOS: CPT

## 2020-07-21 PROCEDURE — 85025 COMPLETE CBC W/AUTO DIFF WBC: CPT | Performed by: PSYCHIATRY & NEUROLOGY

## 2020-07-21 PROCEDURE — 83540 ASSAY OF IRON: CPT | Performed by: PSYCHIATRY & NEUROLOGY

## 2020-07-21 RX ORDER — HEPARIN SODIUM,PORCINE 10 UNIT/ML
5 VIAL (ML) INTRAVENOUS
Status: CANCELLED | OUTPATIENT
Start: 2020-08-06

## 2020-07-21 RX ORDER — HEPARIN SODIUM (PORCINE) LOCK FLUSH IV SOLN 100 UNIT/ML 100 UNIT/ML
5 SOLUTION INTRAVENOUS
Status: CANCELLED | OUTPATIENT
Start: 2020-08-06

## 2020-07-21 RX ADMIN — Medication 250 ML: at 14:07

## 2020-07-21 NOTE — PROGRESS NOTES
Infusion Nursing Note:  Chayo Nathan presents today for Tysabri.    Patient seen by provider today: No   present during visit today: Not Applicable.    Note: N/A.    Intravenous Access:  Peripheral IV placed.    Treatment Conditions:  Tysabri pre-infusion checklist completed via touch program.      Post Infusion Assessment:  Patient tolerated infusion without incident.  Chayo declined the observation period post Tysabri.       Discharge Plan:   Patient discharged in stable condition accompanied by: self.    Vianey Foley RN

## 2020-07-28 LAB — LAB SCANNED RESULT: ABNORMAL

## 2020-08-18 ENCOUNTER — INFUSION THERAPY VISIT (OUTPATIENT)
Dept: INFUSION THERAPY | Facility: CLINIC | Age: 45
End: 2020-08-18
Payer: OTHER GOVERNMENT

## 2020-08-18 VITALS
SYSTOLIC BLOOD PRESSURE: 126 MMHG | RESPIRATION RATE: 18 BRPM | DIASTOLIC BLOOD PRESSURE: 66 MMHG | OXYGEN SATURATION: 99 % | HEART RATE: 82 BPM | TEMPERATURE: 97.6 F

## 2020-08-18 DIAGNOSIS — G35 MULTIPLE SCLEROSIS (H): Primary | ICD-10-CM

## 2020-08-18 PROCEDURE — 96413 CHEMO IV INFUSION 1 HR: CPT | Performed by: NURSE PRACTITIONER

## 2020-08-18 PROCEDURE — 99207 ZZC NO CHARGE NURSE ONLY: CPT

## 2020-08-18 RX ORDER — HEPARIN SODIUM,PORCINE 10 UNIT/ML
5 VIAL (ML) INTRAVENOUS
Status: CANCELLED | OUTPATIENT
Start: 2020-09-15

## 2020-08-18 RX ORDER — HEPARIN SODIUM (PORCINE) LOCK FLUSH IV SOLN 100 UNIT/ML 100 UNIT/ML
5 SOLUTION INTRAVENOUS
Status: CANCELLED | OUTPATIENT
Start: 2020-09-15

## 2020-08-18 RX ADMIN — Medication 250 ML: at 13:53

## 2020-08-18 ASSESSMENT — PAIN SCALES - GENERAL: PAINLEVEL: NO PAIN (0)

## 2020-09-16 ENCOUNTER — INFUSION THERAPY VISIT (OUTPATIENT)
Dept: INFUSION THERAPY | Facility: CLINIC | Age: 45
End: 2020-09-16
Payer: OTHER GOVERNMENT

## 2020-09-16 VITALS
HEART RATE: 61 BPM | DIASTOLIC BLOOD PRESSURE: 82 MMHG | BODY MASS INDEX: 39.75 KG/M2 | WEIGHT: 269.2 LBS | TEMPERATURE: 98 F | OXYGEN SATURATION: 100 % | SYSTOLIC BLOOD PRESSURE: 138 MMHG

## 2020-09-16 DIAGNOSIS — G35 MULTIPLE SCLEROSIS (H): Primary | ICD-10-CM

## 2020-09-16 PROCEDURE — 96413 CHEMO IV INFUSION 1 HR: CPT | Performed by: NURSE PRACTITIONER

## 2020-09-16 PROCEDURE — 99207 ZZC NO CHARGE NURSE ONLY: CPT

## 2020-09-16 RX ORDER — HEPARIN SODIUM,PORCINE 10 UNIT/ML
5 VIAL (ML) INTRAVENOUS
Status: CANCELLED | OUTPATIENT
Start: 2020-10-13

## 2020-09-16 RX ORDER — HEPARIN SODIUM (PORCINE) LOCK FLUSH IV SOLN 100 UNIT/ML 100 UNIT/ML
5 SOLUTION INTRAVENOUS
Status: CANCELLED | OUTPATIENT
Start: 2020-10-13

## 2020-09-16 RX ADMIN — Medication 250 ML: at 13:59

## 2020-09-16 ASSESSMENT — PAIN SCALES - GENERAL: PAINLEVEL: NO PAIN (0)

## 2020-09-16 NOTE — PROGRESS NOTES
Infusion Nursing Note:  Chayo CARMICHAEL Jac presents today for Tysabri.    Patient seen by provider today: No   present during visit today: Not Applicable.    Note: Pt denies any new medical concerns, see flow sheet for assessment.    Intravenous Access:  Peripheral IV placed.    Treatment Conditions:  Tysabri pre-infusion checklist completed via touch program.      Post Infusion Assessment:  Patient tolerated infusion without incident.  Patient did not want to be observed for 60 minutes post Tysabri per protocol.  Blood return noted pre and post infusion.  Site patent and intact, free from redness, edema or discomfort.  No evidence of extravasations.  Access discontinued per protocol.       Discharge Plan:   Patient discharged in stable condition accompanied by: self.  Departure Mode: Ambulatory.  Pt will RTC 10/15/20 for Tysabri.    Ousmane Castellano RN

## 2020-10-15 ENCOUNTER — INFUSION THERAPY VISIT (OUTPATIENT)
Dept: INFUSION THERAPY | Facility: CLINIC | Age: 45
End: 2020-10-15
Payer: OTHER GOVERNMENT

## 2020-10-15 VITALS
OXYGEN SATURATION: 100 % | DIASTOLIC BLOOD PRESSURE: 78 MMHG | BODY MASS INDEX: 40.11 KG/M2 | TEMPERATURE: 98.2 F | SYSTOLIC BLOOD PRESSURE: 117 MMHG | RESPIRATION RATE: 14 BRPM | HEART RATE: 75 BPM | WEIGHT: 271.6 LBS

## 2020-10-15 DIAGNOSIS — G35 MULTIPLE SCLEROSIS (H): Primary | ICD-10-CM

## 2020-10-15 PROCEDURE — 96413 CHEMO IV INFUSION 1 HR: CPT | Performed by: PHYSICIAN ASSISTANT

## 2020-10-15 PROCEDURE — 99207 PR NO CHARGE LOS: CPT

## 2020-10-15 RX ORDER — MULTIPLE VITAMINS W/ MINERALS TAB 9MG-400MCG
1 TAB ORAL DAILY
COMMUNITY

## 2020-10-15 RX ORDER — HEPARIN SODIUM (PORCINE) LOCK FLUSH IV SOLN 100 UNIT/ML 100 UNIT/ML
5 SOLUTION INTRAVENOUS
Status: CANCELLED | OUTPATIENT
Start: 2020-11-11

## 2020-10-15 RX ORDER — HEPARIN SODIUM,PORCINE 10 UNIT/ML
5 VIAL (ML) INTRAVENOUS
Status: CANCELLED | OUTPATIENT
Start: 2020-11-11

## 2020-10-15 RX ADMIN — Medication 250 ML: at 13:00

## 2020-10-15 ASSESSMENT — PAIN SCALES - GENERAL: PAINLEVEL: NO PAIN (1)

## 2020-10-15 NOTE — PROGRESS NOTES
Infusion Nursing Note:  Chayo CARMICHAEL Jac presents today for Tysabri infusion.    Patient seen by provider today: No   present during visit today: Not Applicable.    Note: N/A.    Intravenous Access:  Peripheral IV placed.    Treatment Conditions:  Tysabri pre-infusion checklist completed via touch program.      Post Infusion Assessment:  Patient tolerated infusion without incident.  Blood return noted pre and post infusion.  Site patent and intact, free from redness, edema or discomfort.  No evidence of extravasations.  Access discontinued per protocol.       Discharge Plan:   Patient will return 11/13/2020 for next appointment.   Patient discharged in stable condition accompanied by: self.  Departure Mode: Ambulatory.    Argenis Ruiz RN

## 2020-11-13 ENCOUNTER — INFUSION THERAPY VISIT (OUTPATIENT)
Dept: INFUSION THERAPY | Facility: CLINIC | Age: 45
End: 2020-11-13
Payer: OTHER GOVERNMENT

## 2020-11-13 VITALS
SYSTOLIC BLOOD PRESSURE: 125 MMHG | HEART RATE: 76 BPM | WEIGHT: 269 LBS | TEMPERATURE: 98.4 F | OXYGEN SATURATION: 99 % | DIASTOLIC BLOOD PRESSURE: 79 MMHG | RESPIRATION RATE: 18 BRPM | BODY MASS INDEX: 39.72 KG/M2

## 2020-11-13 DIAGNOSIS — G35 MULTIPLE SCLEROSIS (H): Primary | ICD-10-CM

## 2020-11-13 PROCEDURE — 99207 PR NO CHARGE NURSE ONLY: CPT

## 2020-11-13 PROCEDURE — 96413 CHEMO IV INFUSION 1 HR: CPT | Performed by: INTERNAL MEDICINE

## 2020-11-13 RX ORDER — HEPARIN SODIUM,PORCINE 10 UNIT/ML
5 VIAL (ML) INTRAVENOUS
Status: CANCELLED | OUTPATIENT
Start: 2020-12-10

## 2020-11-13 RX ORDER — HEPARIN SODIUM (PORCINE) LOCK FLUSH IV SOLN 100 UNIT/ML 100 UNIT/ML
5 SOLUTION INTRAVENOUS
Status: CANCELLED | OUTPATIENT
Start: 2020-12-10

## 2020-11-13 RX ADMIN — Medication 250 ML: at 09:56

## 2020-11-13 NOTE — PROGRESS NOTES
Infusion Nursing Note:  Chayo JANY Nathan presents today for Tysabri.    Patient seen by provider today: No   present during visit today: Not Applicable.    Note: N/A.    Intravenous Access:  Peripheral IV placed.    Treatment Conditions:  Tysabri pre-infusion checklist completed via touch program.      Post Infusion Assessment:  Patient tolerated infusion without incident.  Blood return noted pre and post infusion.  Site patent and intact, free from redness, edema or discomfort.  Access discontinued per protocol.       Discharge Plan:   Patient will return 12/14/20 for next appointment.   Patient discharged in stable condition accompanied by: self.  Departure Mode: Ambulatory.    Sobia Portillo RN

## 2020-12-10 ENCOUNTER — TELEPHONE (OUTPATIENT)
Dept: NEUROLOGY | Facility: CLINIC | Age: 45
End: 2020-12-10

## 2020-12-10 RX ORDER — HEPARIN SODIUM,PORCINE 10 UNIT/ML
5 VIAL (ML) INTRAVENOUS
Status: CANCELLED | OUTPATIENT
Start: 2020-12-10

## 2020-12-10 RX ORDER — HEPARIN SODIUM (PORCINE) LOCK FLUSH IV SOLN 100 UNIT/ML 100 UNIT/ML
5 SOLUTION INTRAVENOUS
Status: CANCELLED | OUTPATIENT
Start: 2020-12-10

## 2020-12-10 NOTE — TELEPHONE ENCOUNTER
We received notification from the infusion center that patient needs updated Tysabri orders; These have been placed per [place] refill protocol for Dr. Hooks to cosign; Patient is scheduled for Monday.    Bonnie Frias MS RN Care Coordinator

## 2020-12-14 ENCOUNTER — INFUSION THERAPY VISIT (OUTPATIENT)
Dept: INFUSION THERAPY | Facility: CLINIC | Age: 45
End: 2020-12-14
Payer: OTHER GOVERNMENT

## 2020-12-14 VITALS
DIASTOLIC BLOOD PRESSURE: 77 MMHG | TEMPERATURE: 97 F | OXYGEN SATURATION: 100 % | HEART RATE: 69 BPM | BODY MASS INDEX: 39.58 KG/M2 | SYSTOLIC BLOOD PRESSURE: 124 MMHG | WEIGHT: 268 LBS | RESPIRATION RATE: 18 BRPM

## 2020-12-14 DIAGNOSIS — G35 MULTIPLE SCLEROSIS (H): Primary | ICD-10-CM

## 2020-12-14 PROCEDURE — 99207 PR NO CHARGE LOS: CPT

## 2020-12-14 PROCEDURE — 96413 CHEMO IV INFUSION 1 HR: CPT | Performed by: NURSE PRACTITIONER

## 2020-12-14 RX ORDER — HEPARIN SODIUM (PORCINE) LOCK FLUSH IV SOLN 100 UNIT/ML 100 UNIT/ML
5 SOLUTION INTRAVENOUS
Status: CANCELLED | OUTPATIENT
Start: 2021-01-11

## 2020-12-14 RX ORDER — HEPARIN SODIUM,PORCINE 10 UNIT/ML
5 VIAL (ML) INTRAVENOUS
Status: CANCELLED | OUTPATIENT
Start: 2021-01-11

## 2020-12-14 RX ADMIN — Medication 250 ML: at 10:03

## 2020-12-14 ASSESSMENT — PAIN SCALES - GENERAL: PAINLEVEL: NO PAIN (0)

## 2020-12-14 NOTE — PROGRESS NOTES
Infusion Nursing Note:  Chayo Nathan presents today for tysabri monthly.    Patient seen by provider today: No   present during visit today: Not Applicable.    Note: Pt denies any problems with her infusions, states she has noticed increase in symptoms if she is late on her therapy - States she doesn't stay for the observation time.    Intravenous Access:  Peripheral IV placed.    Treatment Conditions:  Tysabri pre-infusion checklist completed via touch program.      Post Infusion Assessment:  Patient tolerated infusion without incident.  Blood return noted pre and post infusion.  Site patent and intact, free from redness, edema or discomfort.  No evidence of extravasations.  Access discontinued per protocol.       Discharge Plan:   Return 01/11/2021 and 02/08/2021 for Tysabri.  Discharge instructions reviewed with: Patient.  Patient and/or family verbalized understanding of discharge instructions and all questions answered.  Patient discharged in stable condition accompanied by: self.  Departure Mode: Ambulatory.    Marsha Son RN

## 2021-01-11 ENCOUNTER — INFUSION THERAPY VISIT (OUTPATIENT)
Dept: INFUSION THERAPY | Facility: CLINIC | Age: 46
End: 2021-01-11
Payer: OTHER GOVERNMENT

## 2021-01-11 VITALS
DIASTOLIC BLOOD PRESSURE: 64 MMHG | WEIGHT: 267 LBS | OXYGEN SATURATION: 98 % | BODY MASS INDEX: 39.43 KG/M2 | TEMPERATURE: 98 F | SYSTOLIC BLOOD PRESSURE: 104 MMHG | HEART RATE: 70 BPM | RESPIRATION RATE: 16 BRPM

## 2021-01-11 DIAGNOSIS — G35 MULTIPLE SCLEROSIS (H): Primary | ICD-10-CM

## 2021-01-11 PROCEDURE — 96413 CHEMO IV INFUSION 1 HR: CPT | Performed by: NURSE PRACTITIONER

## 2021-01-11 PROCEDURE — 99207 PR NO CHARGE LOS: CPT

## 2021-01-11 RX ORDER — HEPARIN SODIUM (PORCINE) LOCK FLUSH IV SOLN 100 UNIT/ML 100 UNIT/ML
5 SOLUTION INTRAVENOUS
Status: CANCELLED | OUTPATIENT
Start: 2021-02-08

## 2021-01-11 RX ORDER — HEPARIN SODIUM,PORCINE 10 UNIT/ML
5 VIAL (ML) INTRAVENOUS
Status: CANCELLED | OUTPATIENT
Start: 2021-02-08

## 2021-01-11 RX ADMIN — Medication 250 ML: at 09:53

## 2021-01-11 ASSESSMENT — PAIN SCALES - GENERAL: PAINLEVEL: NO PAIN (0)

## 2021-01-11 NOTE — PROGRESS NOTES
Infusion Nursing Note:  Chayo J Jac presents today for Tysabri.    Patient seen by provider today: No   present during visit today: Not Applicable.    Note: N/A.    Intravenous Access:  Peripheral IV placed.    Treatment Conditions:  Tysabri pre-infusion checklist completed via touch program.      Post Infusion Assessment:  Patient tolerated infusion without incident.   Patient declined 60 minute observation post Tysabri.    Discharge Plan:   Patient will return 2/8 for next appointment.   Patient discharged in stable condition accompanied by: self.  Departure Mode: Ambulatory.    Shirley Crawford RN

## 2021-02-08 ENCOUNTER — INFUSION THERAPY VISIT (OUTPATIENT)
Dept: INFUSION THERAPY | Facility: CLINIC | Age: 46
End: 2021-02-08
Payer: OTHER GOVERNMENT

## 2021-02-08 VITALS
OXYGEN SATURATION: 98 % | SYSTOLIC BLOOD PRESSURE: 109 MMHG | WEIGHT: 270 LBS | BODY MASS INDEX: 39.87 KG/M2 | TEMPERATURE: 98.2 F | HEART RATE: 69 BPM | RESPIRATION RATE: 16 BRPM | DIASTOLIC BLOOD PRESSURE: 69 MMHG

## 2021-02-08 DIAGNOSIS — G35 MULTIPLE SCLEROSIS (H): Primary | ICD-10-CM

## 2021-02-08 PROCEDURE — 99207 PR NO CHARGE LOS: CPT

## 2021-02-08 PROCEDURE — 96413 CHEMO IV INFUSION 1 HR: CPT | Performed by: NURSE PRACTITIONER

## 2021-02-08 RX ORDER — HEPARIN SODIUM (PORCINE) LOCK FLUSH IV SOLN 100 UNIT/ML 100 UNIT/ML
5 SOLUTION INTRAVENOUS
Status: CANCELLED | OUTPATIENT
Start: 2021-03-08

## 2021-02-08 RX ORDER — HEPARIN SODIUM,PORCINE 10 UNIT/ML
5 VIAL (ML) INTRAVENOUS
Status: CANCELLED | OUTPATIENT
Start: 2021-03-08

## 2021-02-08 RX ADMIN — Medication 250 ML: at 09:47

## 2021-02-08 ASSESSMENT — PAIN SCALES - GENERAL: PAINLEVEL: NO PAIN (0)

## 2021-02-08 NOTE — PROGRESS NOTES
Infusion Nursing Note:  Chayo JANY FergusonJac presents today for Tysabri.    Patient seen by provider today: No   present during visit today: Not Applicable.    Note: N/A.    Intravenous Access:  Peripheral IV placed.    Treatment Conditions:  Tysabri pre-infusion checklist completed via touch program.      Post Infusion Assessment:  Patient tolerated infusion without incident.  Site patent and intact, free from redness, edema or discomfort.  No evidence of extravasations.  Access discontinued per protocol.  Patient declined staying for 60 minute observation.       Discharge Plan:   Patient will return 3/8 for next appointment.   Patient discharged in stable condition accompanied by: self.  Departure Mode: Ambulatory.    Shirley Crawford RN

## 2021-03-08 ENCOUNTER — INFUSION THERAPY VISIT (OUTPATIENT)
Dept: INFUSION THERAPY | Facility: CLINIC | Age: 46
End: 2021-03-08
Payer: OTHER GOVERNMENT

## 2021-03-08 VITALS
WEIGHT: 270 LBS | SYSTOLIC BLOOD PRESSURE: 113 MMHG | HEART RATE: 69 BPM | TEMPERATURE: 98.2 F | OXYGEN SATURATION: 98 % | BODY MASS INDEX: 39.87 KG/M2 | RESPIRATION RATE: 16 BRPM | DIASTOLIC BLOOD PRESSURE: 68 MMHG

## 2021-03-08 DIAGNOSIS — G35 MULTIPLE SCLEROSIS (H): Primary | ICD-10-CM

## 2021-03-08 PROCEDURE — 99207 PR NO CHARGE LOS: CPT

## 2021-03-08 PROCEDURE — 96413 CHEMO IV INFUSION 1 HR: CPT | Performed by: NURSE PRACTITIONER

## 2021-03-08 RX ORDER — HEPARIN SODIUM,PORCINE 10 UNIT/ML
5 VIAL (ML) INTRAVENOUS
Status: CANCELLED | OUTPATIENT
Start: 2021-04-05

## 2021-03-08 RX ORDER — HEPARIN SODIUM (PORCINE) LOCK FLUSH IV SOLN 100 UNIT/ML 100 UNIT/ML
5 SOLUTION INTRAVENOUS
Status: CANCELLED | OUTPATIENT
Start: 2021-04-05

## 2021-03-08 RX ADMIN — Medication 250 ML: at 09:55

## 2021-03-08 ASSESSMENT — PAIN SCALES - GENERAL: PAINLEVEL: NO PAIN (0)

## 2021-03-08 NOTE — PROGRESS NOTES
Infusion Nursing Note:  Chayo Nathan presents today for Tysabri.    Patient seen by provider today: No   present during visit today: Not Applicable.    Note: Patient reports stable symptoms with the exception of her leaning towards the right from time to time.     Patient did meet criteria for an asymptomatic covid-19 PCR test in infusion today. Patient declined the covid-19 test.    Intravenous Access:  Peripheral IV placed.    Treatment Conditions:  Tysabri pre-infusion checklist completed via touch program.      Post Infusion Assessment:  Patient tolerated infusion without incident.  Patient declined tysabri observation.  No evidence of extravasations.       Discharge Plan:   Discharge instructions reviewed with: Patient.  Patient and/or family verbalized understanding of discharge instructions and all questions answered.  Departure Mode: Ambulatory.    Pauline Rendon RN

## 2021-04-05 ENCOUNTER — INFUSION THERAPY VISIT (OUTPATIENT)
Dept: INFUSION THERAPY | Facility: CLINIC | Age: 46
End: 2021-04-05
Payer: OTHER GOVERNMENT

## 2021-04-05 VITALS
OXYGEN SATURATION: 97 % | BODY MASS INDEX: 39.72 KG/M2 | WEIGHT: 269 LBS | RESPIRATION RATE: 16 BRPM | HEART RATE: 69 BPM | TEMPERATURE: 98.7 F | DIASTOLIC BLOOD PRESSURE: 75 MMHG | SYSTOLIC BLOOD PRESSURE: 114 MMHG

## 2021-04-05 DIAGNOSIS — G35 MULTIPLE SCLEROSIS (H): Primary | ICD-10-CM

## 2021-04-05 PROCEDURE — 96413 CHEMO IV INFUSION 1 HR: CPT | Performed by: NURSE PRACTITIONER

## 2021-04-05 PROCEDURE — 99207 PR NO CHARGE LOS: CPT

## 2021-04-05 RX ORDER — HEPARIN SODIUM,PORCINE 10 UNIT/ML
5 VIAL (ML) INTRAVENOUS
Status: CANCELLED | OUTPATIENT
Start: 2021-05-03

## 2021-04-05 RX ORDER — HEPARIN SODIUM (PORCINE) LOCK FLUSH IV SOLN 100 UNIT/ML 100 UNIT/ML
5 SOLUTION INTRAVENOUS
Status: CANCELLED | OUTPATIENT
Start: 2021-05-03

## 2021-04-05 RX ADMIN — Medication 250 ML: at 10:06

## 2021-04-05 ASSESSMENT — PAIN SCALES - GENERAL: PAINLEVEL: NO PAIN (0)

## 2021-04-05 NOTE — PROGRESS NOTES
Infusion Nursing Note:  Chayo Nathan presents today for  tysabri.    Patient seen by provider today: No   present during visit today: Not Applicable.    Note: N/A.  Patient did meet criteria for an asymptomatic covid-19 PCR test in infusion today. Patient declined the covid-19 test.    Intravenous Access:  Peripheral IV placed.    Treatment Conditions:  Tysabri pre-infusion checklist completed via touch program.      Post Infusion Assessment:  Patient tolerated infusion without incident.  No evidence of extravasations.  Access discontinued per protocol.  Biologic Infusion Post Education: Call the triage nurse at your clinic or seek medical attention if you have chills and/or temperature greater than or equal to 100.5, uncontrolled nausea/vomiting, diarrhea, constipation, dizziness, shortness of breath, chest pain, heart palpitations, weakness or any other new or concerning symptoms, questions or concerns.  You cannot have any live virus vaccines prior to or during treatment or up to 6 months post infusion.  If you have an upcoming surgery, medical procedure or dental procedure during treatment, this should be discussed with your ordering physician and your surgeon/dentist.  If you are having any concerning symptom, if you are unsure if you should get your next infusion or wish to speak to a provider before your next infusion, please call your care coordinator or triage nurse at your clinic to notify them so we can adequately serve you.       Discharge Plan:   Patient discharged in stable condition accompanied by: self.  Departure Mode: Ambulatory.    Amy Moya RN

## 2021-04-25 ENCOUNTER — HEALTH MAINTENANCE LETTER (OUTPATIENT)
Age: 46
End: 2021-04-25

## 2021-05-05 ENCOUNTER — INFUSION THERAPY VISIT (OUTPATIENT)
Dept: INFUSION THERAPY | Facility: CLINIC | Age: 46
End: 2021-05-05
Payer: OTHER GOVERNMENT

## 2021-05-05 VITALS
WEIGHT: 258.6 LBS | DIASTOLIC BLOOD PRESSURE: 78 MMHG | SYSTOLIC BLOOD PRESSURE: 120 MMHG | HEART RATE: 67 BPM | RESPIRATION RATE: 16 BRPM | OXYGEN SATURATION: 98 % | BODY MASS INDEX: 38.19 KG/M2 | TEMPERATURE: 98.4 F

## 2021-05-05 DIAGNOSIS — G35 MULTIPLE SCLEROSIS (H): Primary | ICD-10-CM

## 2021-05-05 PROCEDURE — 96413 CHEMO IV INFUSION 1 HR: CPT | Performed by: NURSE PRACTITIONER

## 2021-05-05 PROCEDURE — 99207 PR NO CHARGE LOS: CPT

## 2021-05-05 RX ORDER — HEPARIN SODIUM (PORCINE) LOCK FLUSH IV SOLN 100 UNIT/ML 100 UNIT/ML
5 SOLUTION INTRAVENOUS
Status: CANCELLED | OUTPATIENT
Start: 2021-05-31

## 2021-05-05 RX ORDER — HEPARIN SODIUM,PORCINE 10 UNIT/ML
5 VIAL (ML) INTRAVENOUS
Status: CANCELLED | OUTPATIENT
Start: 2021-05-31

## 2021-05-05 RX ADMIN — Medication 250 ML: at 14:46

## 2021-05-05 ASSESSMENT — PAIN SCALES - GENERAL: PAINLEVEL: NO PAIN (0)

## 2021-05-05 NOTE — PROGRESS NOTES
Infusion Nursing Note:  Chayo Nathan presents today for Tysabri.    Patient seen by provider today: No   present during visit today: Not Applicable.    Note: Patient stated that she is LILIANE virus positive and will likely need to change her treatment to ocrevus in the coming months. Meeting with her neurologist in a few weeks to discuss.     Patient declined the covid-19 test.    Intravenous Access:  Peripheral IV placed.    Treatment Conditions:  Tysabri pre-infusion checklist completed via touch program.      Post Infusion Assessment:  Patient tolerated infusion without incident.  Patient declined tysabri observation.  Blood return noted pre and post infusion.  Site patent and intact, free from redness, edema or discomfort.  No evidence of extravasations.  Access discontinued per protocol.       Discharge Plan:   Discharge instructions reviewed with: Patient.  Patient and/or family verbalized understanding of discharge instructions and all questions answered.  Patient discharged in stable condition accompanied by: self.  Departure Mode: Ambulatory.    Pauline Rendon RN

## 2021-05-19 ENCOUNTER — OFFICE VISIT (OUTPATIENT)
Dept: NEUROLOGY | Facility: CLINIC | Age: 46
End: 2021-05-19
Attending: PSYCHIATRY & NEUROLOGY
Payer: OTHER GOVERNMENT

## 2021-05-19 VITALS
WEIGHT: 251.7 LBS | SYSTOLIC BLOOD PRESSURE: 115 MMHG | HEART RATE: 68 BPM | BODY MASS INDEX: 37.17 KG/M2 | DIASTOLIC BLOOD PRESSURE: 74 MMHG

## 2021-05-19 DIAGNOSIS — G35 MS (MULTIPLE SCLEROSIS) (H): Primary | ICD-10-CM

## 2021-05-19 PROCEDURE — G0463 HOSPITAL OUTPT CLINIC VISIT: HCPCS

## 2021-05-19 PROCEDURE — 99214 OFFICE O/P EST MOD 30 MIN: CPT | Performed by: PSYCHIATRY & NEUROLOGY

## 2021-05-19 ASSESSMENT — PAIN SCALES - GENERAL: PAINLEVEL: NO PAIN (0)

## 2021-05-19 NOTE — PROGRESS NOTES
THE [facility] MULTIPLE SCLEROSIS CLINIC  FOLLOW UP VISIT           PRINCIPAL NEUROLOGIC DIAGNOSIS: Multiple Sclerosis        HISTORY OF ILLNESS:    This is a follow visit for this 46 year old right handed genetic female  With a history of MS. Who was last seen on  7-.     At that time the patient was recommended to:    Relapsing remitting multiple sclerosis, status post 6 infusions of Tysabri with very good clinical response.  The patient states that she feels better than ever, has more energy, and even her thoughts phenomenons have decreased in intensity since being on Tysabri, especially when at the beach. Plan is to repeat a CBC, CMP and LILIANE virus as well as iron studies due to menometrorrhagia.    Since last visit she is still having prolonged periods due to fibroids but getting better  Her Hb was 11 in March after 5 infusions of iron. She started a diet and walking 3 miles instead of 1 to lose weight so we discussed reducing to 2 miles and slowly increasing to 3 to improve endurance    Current Symptoms:  1. R leg heaviness on exertion (3 mile walk)    Current Outpatient Prescriptions:  Current Outpatient Medications   Medication     baclofen (LIORESAL) 10 MG tablet     multivitamin w/minerals (MULTI-VITAMIN) tablet     sodium chloride 0.9 % SOLN 100 mL with natalizumab 300 MG/15ML CONC 300 mg     vitamin C (ASCORBIC ACID) 1000 MG TABS     vitamin D3 (CHOLECALCIFEROL) 48249 units (250 mcg) capsule     No current facility-administered medications for this visit.           ALLERGIES       Allergies   Allergen Reactions     Bees Anaphylaxis     Ferumoxytol Difficulty breathing     Penicillin G Rash     Sulfa Drugs Rash           REVIEW OF SYSTEMS:    Comprehensive review of systems otherwise was negative, including constitutional, head and neck, cardiovascular, pulmonary, gastrointestinal, endocrine, urologic, reproductive, rheumatic, hematologic, immunologic, dermatologic, and  psychiatric.    Nutritional concerns: None  Driving issues: None   Safety concerns regarding living situations and safety at home: None  Risk of falls: None  Pain: None    PHYSICAL EXAM:    Hair, skin, nails, and joints were normal. Neck was supple without Lhermitte's phenomenon.  There was no percussion tenderness over the spine.     The patient was alert and oriented to person, place, and time with normal language, attention and concentration, recent and remote memory, praxis, and intellectual function. Affect was normal. The patient did not appear depressed.    Visual acuity:  OD 20/20  OS 20/20    Correction: without    Visual fields were full to confrontation.   Pupils were 3 mm and briskly reactive OU without a relative afferent pupillary defect.  Funduscopic examination was Deferred  Extraocular movements: Intact without HORTENCIA  Facial sensation is normal. Normal strength of the muscles of mastication:   Muscles of facial expression were normal  Hearing was normal. Gag reflex and palatal movements were normal. Sternocleidomastoid and trapezius power were normal. Tongue movements were normal. There was no dysarthria.    Motor Examination:   There was no pronator drift.       Motor    Upper      Right Left   Shoulder Abduction 5 5   Elbow Flexion 5 5   Elbow Extension 5 5   Wrist Extension 5 5   Digit Extension 5 5   Digit Flexion 5 5   APB 5 5   Tone 0 0   Lower       Right Left   Hip Flexion 5 5   Knee Extension 5 5   Knee Flexion 5 5   Foot Dorsiflexion 5 4+   Foot Plantar Flexion 5 5   EH 5 5   Toe Flexion 5 5   Tone 0 0               Reflexes:     Reflexes       Right  Left   Biceps 1  1   Triceps 1  1   Brachioradialis 1  1   Patellar  1  1   Achilles 1  1   Babinski down  down         Coordination:     Right Left   RRM Normal Normal   LYUDMILA Normal Normal   FTN Normal Normal   RRM Normal Normal   HKS Normal Normal         Sensory examination:    Light touch:  Intact in all extremities      Coordination and  Gait        Gait Normal   Right Left   Romberg Normal  Heel Normal Normal   Tandem {Normal  Toe Normal Normal                   QUANTITATIVE SCORES:    Visual: 0-Normal  Brainstem: 0-Normal  Pyramidal: 2-minimal disability: patient complains of motor-fatigability or reduced performance in strenuous motor tasks (motor performance grade 1) and/or BMRC grade 4 in one or two muscle groups  Cerebellar: 0-Normal  Sensory: 0-Normal  Bladder/Bowel: 0-Normal  Cerebral: 0-Normal  Ambulatory: 0-Unrestricted    EDSS: 2.0- minimal disability in one FS (one FS grade 2, others 0 or 1)          ASSESSMENT:    RRMS clinically stable on Tysabri, 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    Need sblood work to look for occult toxicity to Tysabri and Vit D levels.    PLAN:    Obtain MRI of the B, C and T spine w/wo contrast to confirm disease stability from the imaging stand point. Continue Tysabri for now but if JCV is positive we will consider Ocrevus or Uplizna.    Finally I will follow the patient up in 3 month(s) as long as the patient is doing well. I instructed the patient to call or mychart my office with any concerns or questions.    I spent 30 minutes in this visit TODAY, with >50% direct patient time spent counseling about prognosis, treatment options, and coordination of care.     My recommendations will be communicated back to the patient's physician(s) by mail.  Follow-up is expected to be with me.      Maryann Hooks MD  Chief, Multiple Sclerosis Division  Department of Neurology  [facility]

## 2021-05-19 NOTE — LETTER
5/19/2021       RE: Chayo Nathan  [address] [facility] [address]     Dear Colleague,    Thank you for referring your patient, Chayo Nathan, to the [facility] MULTIPLE SCLEROSIS CLINIC [place] [facility]. Please see a copy of my visit note below.    THE [facility] MULTIPLE SCLEROSIS CLINIC  FOLLOW UP VISIT       PRINCIPAL NEUROLOGIC DIAGNOSIS: Multiple Sclerosis      HISTORY OF ILLNESS:    This is a follow visit for this 46 year old right handed genetic female  With a history of MS. Who was last seen on  7-.     At that time the patient was recommended to:    Relapsing remitting multiple sclerosis, status post 6 infusions of Tysabri with very good clinical response.  The patient states that she feels better than ever, has more energy, and even her thoughts phenomenons have decreased in intensity since being on Tysabri, especially when at the beach. Plan is to repeat a CBC, CMP and LILIANE virus as well as iron studies due to menometrorrhagia.    Since last visit she is still having prolonged periods due to fibroids but getting better  Her Hb was 11 in March after 5 infusions of iron. She started a diet and walking 3 miles instead of 1 to lose weight so we discussed reducing to 2 miles and slowly increasing to 3 to improve endurance    Current Symptoms:  1. R leg heaviness on exertion (3 mile walk)    Current Outpatient Prescriptions:  Current Outpatient Medications   Medication     baclofen (LIORESAL) 10 MG tablet     multivitamin w/minerals (MULTI-VITAMIN) tablet     sodium chloride 0.9 % SOLN 100 mL with natalizumab 300 MG/15ML CONC 300 mg     vitamin C (ASCORBIC ACID) 1000 MG TABS     vitamin D3 (CHOLECALCIFEROL) 20780 units (250 mcg) capsule     No current facility-administered medications for this visit.           ALLERGIES       Allergies   Allergen Reactions     Bees Anaphylaxis     Ferumoxytol Difficulty  breathing     Penicillin G Rash     Sulfa Drugs Rash           REVIEW OF SYSTEMS:    Comprehensive review of systems otherwise was negative, including constitutional, head and neck, cardiovascular, pulmonary, gastrointestinal, endocrine, urologic, reproductive, rheumatic, hematologic, immunologic, dermatologic, and psychiatric.    Nutritional concerns: None  Driving issues: None   Safety concerns regarding living situations and safety at home: None  Risk of falls: None  Pain: None    PHYSICAL EXAM:    Hair, skin, nails, and joints were normal. Neck was supple without Lhermitte's phenomenon.  There was no percussion tenderness over the spine.     The patient was alert and oriented to person, place, and time with normal language, attention and concentration, recent and remote memory, praxis, and intellectual function. Affect was normal. The patient did not appear depressed.    Visual acuity:  OD 20/20  OS 20/20    Correction: without    Visual fields were full to confrontation.   Pupils were 3 mm and briskly reactive OU without a relative afferent pupillary defect.  Funduscopic examination was Deferred  Extraocular movements: Intact without HORTENCIA  Facial sensation is normal. Normal strength of the muscles of mastication:   Muscles of facial expression were normal  Hearing was normal. Gag reflex and palatal movements were normal. Sternocleidomastoid and trapezius power were normal. Tongue movements were normal. There was no dysarthria.    Motor Examination:   There was no pronator drift.       Motor    Upper      Right Left   Shoulder Abduction 5 5   Elbow Flexion 5 5   Elbow Extension 5 5   Wrist Extension 5 5   Digit Extension 5 5   Digit Flexion 5 5   APB 5 5   Tone 0 0   Lower       Right Left   Hip Flexion 5 5   Knee Extension 5 5   Knee Flexion 5 5   Foot Dorsiflexion 5 4+   Foot Plantar Flexion 5 5   EH 5 5   Toe Flexion 5 5   Tone 0 0               Reflexes:     Reflexes       Right  Left   Biceps 1  1   Triceps  1  1   Brachioradialis 1  1   Patellar  1  1   Achilles 1  1   Babinski down  down         Coordination:     Right Left   RRM Normal Normal   LYUDMILA Normal Normal   FTN Normal Normal   RRM Normal Normal   HKS Normal Normal         Sensory examination:    Light touch:  Intact in all extremities      Coordination and Gait        Gait Normal   Right Left   Romberg Normal  Heel Normal Normal   Tandem {Normal  Toe Normal Normal       QUANTITATIVE SCORES:    Visual: 0-Normal  Brainstem: 0-Normal  Pyramidal: 2-minimal disability: patient complains of motor-fatigability or reduced performance in strenuous motor tasks (motor performance grade 1) and/or BMRC grade 4 in one or two muscle groups  Cerebellar: 0-Normal  Sensory: 0-Normal  Bladder/Bowel: 0-Normal  Cerebral: 0-Normal  Ambulatory: 0-Unrestricted    EDSS: 2.0- minimal disability in one FS (one FS grade 2, others 0 or 1)          ASSESSMENT:    RRMS clinically stable on Tysabri, 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    Need sblood work to look for occult toxicity to Tysabri and Vit D levels.    PLAN:    Obtain MRI of the B, C and T spine w/wo contrast to confirm disease stability from the imaging stand point. Continue Tysabri for now but if JCV is positive we will consider Ocrevus or Uplizna.    Finally I will follow the patient up in 3 month(s) as long as the patient is doing well. I instructed the patient to call or mychart my office with any concerns or questions.    I spent 30 minutes in this visit TODAY, with >50% direct patient time spent counseling about prognosis, treatment options, and coordination of care.     My recommendations will be communicated back to the patient's physician(s) by mail.  Follow-up is expected to be with me.    Maryann Hooks MD  Chief, Multiple Sclerosis Division  Department of Neurology  [facility] [place]

## 2021-06-03 ENCOUNTER — INFUSION THERAPY VISIT (OUTPATIENT)
Dept: INFUSION THERAPY | Facility: CLINIC | Age: 46
End: 2021-06-03
Payer: OTHER GOVERNMENT

## 2021-06-03 VITALS
TEMPERATURE: 98.3 F | BODY MASS INDEX: 36.28 KG/M2 | WEIGHT: 245.7 LBS | HEART RATE: 63 BPM | RESPIRATION RATE: 12 BRPM | DIASTOLIC BLOOD PRESSURE: 76 MMHG | SYSTOLIC BLOOD PRESSURE: 118 MMHG | OXYGEN SATURATION: 100 %

## 2021-06-03 DIAGNOSIS — G35 MULTIPLE SCLEROSIS (H): Primary | ICD-10-CM

## 2021-06-03 PROCEDURE — 96413 CHEMO IV INFUSION 1 HR: CPT | Performed by: NURSE PRACTITIONER

## 2021-06-03 PROCEDURE — 99207 PR NO CHARGE LOS: CPT

## 2021-06-03 RX ORDER — HEPARIN SODIUM (PORCINE) LOCK FLUSH IV SOLN 100 UNIT/ML 100 UNIT/ML
5 SOLUTION INTRAVENOUS
Status: CANCELLED | OUTPATIENT
Start: 2021-06-30

## 2021-06-03 RX ORDER — HEPARIN SODIUM,PORCINE 10 UNIT/ML
5 VIAL (ML) INTRAVENOUS
Status: CANCELLED | OUTPATIENT
Start: 2021-06-30

## 2021-06-03 RX ORDER — CYANOCOBALAMIN (VITAMIN B-12) 2500 MCG
2500 TABLET, SUBLINGUAL SUBLINGUAL DAILY
COMMUNITY

## 2021-06-03 RX ADMIN — Medication 250 ML: at 11:22

## 2021-06-03 NOTE — PROGRESS NOTES
Infusion Nursing Note:  Chayo Nathan presents today for Tysabri infusion.    Patient seen by provider today: No   present during visit today: Not Applicable.    Note: Patient states her and neurologist decided to draw labs in August 2021, rather than now. Thus no labs drawn today.    Intravenous Access:  Labs drawn without difficulty.    Treatment Conditions:  Tysabri pre-infusion checklist completed via touch program.      Post Infusion Assessment:  Patient tolerated infusion without incident.  Patient declined observation after Tysabri.  Blood return noted pre and post infusion.  Site patent and intact, free from redness, edema or discomfort.  No evidence of extravasations.  Access discontinued per protocol.       Discharge Plan:   [facility]  Patient will return 7/2/21 for next appointment.   Patient discharged in stable condition accompanied by: self.  Departure Mode: Ambulatory.      Argenis Ruiz RN

## 2021-07-02 ENCOUNTER — INFUSION THERAPY VISIT (OUTPATIENT)
Dept: INFUSION THERAPY | Facility: CLINIC | Age: 46
End: 2021-07-02
Payer: OTHER GOVERNMENT

## 2021-07-02 VITALS
WEIGHT: 236 LBS | RESPIRATION RATE: 16 BRPM | OXYGEN SATURATION: 98 % | BODY MASS INDEX: 34.85 KG/M2 | TEMPERATURE: 98.1 F | HEART RATE: 64 BPM | SYSTOLIC BLOOD PRESSURE: 107 MMHG | DIASTOLIC BLOOD PRESSURE: 70 MMHG

## 2021-07-02 DIAGNOSIS — G35 MULTIPLE SCLEROSIS (H): Primary | ICD-10-CM

## 2021-07-02 PROCEDURE — 96365 THER/PROPH/DIAG IV INF INIT: CPT | Performed by: INTERNAL MEDICINE

## 2021-07-02 PROCEDURE — 99207 PR NO CHARGE LOS: CPT

## 2021-07-02 RX ORDER — HEPARIN SODIUM (PORCINE) LOCK FLUSH IV SOLN 100 UNIT/ML 100 UNIT/ML
5 SOLUTION INTRAVENOUS
Status: CANCELLED | OUTPATIENT
Start: 2021-07-29

## 2021-07-02 RX ORDER — HEPARIN SODIUM,PORCINE 10 UNIT/ML
5 VIAL (ML) INTRAVENOUS
Status: CANCELLED | OUTPATIENT
Start: 2021-07-29

## 2021-07-02 RX ADMIN — Medication 250 ML: at 10:03

## 2021-07-02 ASSESSMENT — PAIN SCALES - GENERAL: PAINLEVEL: NO PAIN (0)

## 2021-07-02 NOTE — PROGRESS NOTES
Infusion Nursing Note:  Chayo Nathan presents today for Tysabri.    Patient seen by provider today: No   present during visit today: Not Applicable.    Note: Pt reports always having numbness in feet but has noticed more numbness in left big toe. Also states that her balance has been a little off but denies any recent falls.    Intravenous Access:   Peripheral IV placed.    Treatment Conditions:  Tysabri pre-infusion checklist completed via touch program.      Post Infusion Assessment:  Patient tolerated infusion without incident.  Patient declined staying for 60 minute observation.  Site patent and intact, free from redness, edema or discomfort.  No evidence of extravasations.  Access discontinued per protocol.       Discharge Plan:   Patient will return 7/30 for next appointment.   Patient discharged in stable condition accompanied by: self.  Departure Mode: Ambulatory.      Shirley Crawford RN

## 2021-07-06 ENCOUNTER — OFFICE VISIT (OUTPATIENT)
Dept: DERMATOLOGY | Facility: CLINIC | Age: 46
End: 2021-07-06
Payer: OTHER GOVERNMENT

## 2021-07-06 DIAGNOSIS — L57.8 DIFFUSE PHOTODAMAGE OF SKIN: ICD-10-CM

## 2021-07-06 DIAGNOSIS — L55.9 SUNBURN: ICD-10-CM

## 2021-07-06 DIAGNOSIS — L81.4 SOLAR LENTIGO: ICD-10-CM

## 2021-07-06 DIAGNOSIS — D48.9 NEOPLASM OF UNCERTAIN BEHAVIOR: ICD-10-CM

## 2021-07-06 DIAGNOSIS — Z80.8 FAMILY HX OF MELANOMA: ICD-10-CM

## 2021-07-06 DIAGNOSIS — L82.1 SEBORRHEIC KERATOSES: Primary | ICD-10-CM

## 2021-07-06 DIAGNOSIS — D22.9 NEVUS SPILUS: ICD-10-CM

## 2021-07-06 DIAGNOSIS — L57.0 AK (ACTINIC KERATOSIS): ICD-10-CM

## 2021-07-06 DIAGNOSIS — D23.9 DERMATOFIBROMA: ICD-10-CM

## 2021-07-06 PROCEDURE — 11102 TANGNTL BX SKIN SINGLE LES: CPT | Mod: GC | Performed by: DERMATOLOGY

## 2021-07-06 PROCEDURE — 99203 OFFICE O/P NEW LOW 30 MIN: CPT | Mod: 25 | Performed by: DERMATOLOGY

## 2021-07-06 PROCEDURE — 17003 DESTRUCT PREMALG LES 2-14: CPT | Mod: XS | Performed by: DERMATOLOGY

## 2021-07-06 PROCEDURE — 17000 DESTRUCT PREMALG LESION: CPT | Mod: XS | Performed by: DERMATOLOGY

## 2021-07-06 PROCEDURE — 88305 TISSUE EXAM BY PATHOLOGIST: CPT | Performed by: DERMATOLOGY

## 2021-07-06 ASSESSMENT — PAIN SCALES - GENERAL: PAINLEVEL: NO PAIN (0)

## 2021-07-06 NOTE — PATIENT INSTRUCTIONS
Wound Care After a Biopsy    What is a skin biopsy?  A skin biopsy allows the doctor to examine a very small piece of tissue under the microscope to determine the diagnosis and the best treatment for the skin condition. A local anesthetic (numbing medicine)  is injected with a very small needle into the skin area to be tested. A small piece of skin is taken from the area. Sometimes a suture (stitch) is used.     What are the risks of a skin biopsy?  I will experience scar, bleeding, swelling, pain, crusting and redness. I may experience incomplete removal or recurrence. Risks of this procedure are excessive bleeding, bruising, infection, nerve damage, numbness, thick (hypertrophic or keloidal) scar and non-diagnostic biopsy.    How should I care for my wound for the first 24 hours?    Keep the wound dry and covered for 24 hours    If it bleeds, hold direct pressure on the area for 15 minutes. If bleeding does not stop then go to the emergency room    Avoid strenuous exercise the first 1-2 days or as your doctor instructs you    How should I care for the wound after 24 hours?    After 24 hours, remove the bandage    You may bathe or shower as normal    If you had a scalp biopsy, you can shampoo as usual and can use shower water to clean the biopsy site daily    Clean the wound twice a day with gentle soap and water    Do not scrub, be gentle    Apply white petroleum/Vaseline after cleaning the wound with a cotton swab or a clean finger, and keep the site covered with a Bandaid /bandage. Bandages are not necessary with a scalp biopsy    If you are unable to cover the site with a Bandaid /bandage, re-apply ointment 2-3 times a day to keep the site moist. Moisture will help with healing    Avoid strenuous activity for first 1-2 days    Avoid lakes, rivers, pools, and oceans until the stitches are removed or the site is healed    How do I clean my wound?    Wash hands thoroughly with soap or use hand  before all  wound care    Clean the wound with gentle soap and water    Apply white petroleum/Vaseline  to wound after it is clean    Replace the Bandaid /bandage to keep the wound covered for the first few days or as instructed by your doctor    If you had a scalp biopsy, warm shower water to the area on a daily basis should suffice    What should I use to clean my wound?     Cotton-tipped applicators (Qtips )    White petroleum jelly (Vaseline ). Use a clean new container and use Q-tips to apply.    Bandaids   as needed    Gentle soap     How should I care for my wound long term?    Do not get your wound dirty    Keep up with wound care for one week or until the area is healed.    A small scab will form and fall off by itself when the area is completely healed. The area will be red and will become pink in color as it heals. Sun protection is very important for how your scar will turn out. Sunscreen with an SPF 30 or greater is recommended once the area is healed.    You should have some soreness but it should be mild and slowly go away over several days. Talk to your doctor about using tylenol for pain,    When should I call my doctor?  If you have increased:     Pain or swelling    Pus or drainage (clear or slightly yellow drainage is ok)    Temperature over 100F    Spreading redness or warmth around wound    When will I hear about my results?  The biopsy results can take 2-3 weeks to come back. The clinic will call you with the results, send you a KIXEYEt message, or have you schedule a follow-up clinic or phone time to discuss the results. Contact our clinics if you do not hear from us in 3 weeks.     Who should I call with questions?    [facility]: [phone]     [facility]: [phone]    For urgent needs outside of business hours call the [facility] at [phone] and ask for the dermatology resident on call      Patient Education     Checking  for Skin Cancer  You can find cancer early by checking your skin each month. There are 3 kinds of skin cancer. They are melanoma, basal cell carcinoma, and squamous cell carcinoma. Doing monthly skin checks is the best way to find new marks or skin changes. Follow the instructions below for checking your skin.   The ABCDEs of checking moles for melanoma   Check your moles or growths for signs of melanoma using ABCDE:     Asymmetry: the sides of the mole or growth don t match    Border: the edges are ragged, notched, or blurred    Color: the color within the mole or growth varies    Diameter: the mole or growth is larger than 6 mm (size of a pencil eraser)    Evolving: the size, shape, or color of the mole or growth is changing (evolving is not shown in the images below)    Checking for other types of skin cancer  Basal cell carcinoma or squamous cell carcinoma have symptoms such as:       A spot or mole that looks different from all other marks on your skin    Changes in how an area feels, such as itching, tenderness, or pain    Changes in the skin's surface, such as oozing, bleeding, or scaliness    A sore that does not heal    New swelling or redness beyond the border of a mole    Who s at risk?  Anyone can get skin cancer. But you are at greater risk if you have:     Fair skin, light-colored hair, or light-colored eyes    Many moles or abnormal moles on your skin    A history of sunburns from sunlight or tanning beds    A family history of skin cancer    A history of exposure to radiation or chemicals    A weakened immune system  If you have had skin cancer in the past, you are at risk for recurring skin cancer.   How to check your skin  Do your monthly skin checkups in front of a full-length mirror. Check all parts of your body, including your:     Head (ears, face, neck, and scalp)    Torso (front, back, and sides)    Arms (tops, undersides, upper, and lower armpits)    Hands (palms, backs, and fingers,  including under the nails)    Buttocks and genitals    Legs (front, back, and sides)    Feet (tops, soles, toes, including under the nails, and between toes)  If you have a lot of moles, take digital photos of them each month. Make sure to take photos both up close and from a distance. These can help you see if any moles change over time.   Most skin changes are not cancer. But if you see any changes in your skin, call your doctor right away. Only he or she can diagnose a problem. If you have skin cancer, seeing your doctor can be the first step toward getting the treatment that could save your life.   [employer] last reviewed this educational content on 4/1/2019 2000-2020 [employer]. [address] [place] [address], [place] [address]. All rights reserved. This information is not intended as a substitute for professional medical care. Always follow your healthcare professional's instructions.       When should I call my doctor?    If you are worsening or not improving, please, contact us or seek urgent care as noted below.     Who should I call with questions (adults)?    [facility] (adult and pediatric): [phone]     [facility] (adult): [phone]    For urgent needs outside of business hours call the [facility] at [phone] and ask for the dermatology resident on call    If this is a medical emergency and you are unable to reach an ER, Call [phone]      Who should I call with questions (pediatric)?  [facility]- Pediatric Dermatology  Dr. Maru Salgado, Dr. Mateo Shepard, Dr. Adriana Manzanares, Ivis Nelson, [place]  Dr. Marian Day, Dr. Joyce Tamez & Dr. David Donovan  Non Urgent  Nurse Triage Line; [phone]- Melina and Oralia BATRES Care Coordinatornayely Osorio (/Complex ) [phone]    If you need a prescription refill, please contact your pharmacy. Refills are approved or  denied by our Physicians during normal business hours, Monday through Fridays  Per office policy, refills will not be granted if you have not been seen within the past year (or sooner depending on your child's condition)    Scheduling Information:  Pediatric Appointment Scheduling and Call Center [phone]  Radiology Scheduling- [phone]  Sedation Unit Scheduling- [phone]  [place] Scheduling- General [phone]; Pediatric Dermatology [phone]  Main  Services: [phone]  Zambian: [phone]  Barbadian: [phone]  Hmong/Swiss/Taiwanese: [phone]  Preadmission Nursing Department Fax Number: [phone] (Fax all pre-operative paperwork to this number)    For urgent matters arising during evenings, weekends, or holidays that cannot wait for normal business hours please call [phone] and ask for the Dermatology Resident On-Call to be paged.

## 2021-07-06 NOTE — LETTER
7/6/2021       RE: Chayo Nathan  [address] [facility] [address]     Dear Colleague,    Thank you for referring your patient, Chayo Nathan, to the [facility] [place] [facility]. Please see a copy of my visit note below.    [facility] Dermatology Note  Encounter Date: Jul 6, 2021  Office Visit     Dermatology Problem List:  1. Neoplasm of uncertain behavior, right medial shin s/p shave biopsy 7/6/21, pathology pending  2. AK, right ear, forehead s/p cryo 7/6/21  3. Nevus spilus, left foot    ____________________________________________    Assessment & Plan:  1. Neoplasm of uncertain behavior  - right medial shin, s/p shave bx, concern for BCC vs SCC    2. AK (actinic keratosis)  - Cryo to R ear x1 and forehead x2    3. Diffuse photodamage of skin  - Counseled on sunscreen use, sun avoidance    4. Family hx of melanoma  - In sister, no other family members with pancreatic cancer or melanoma to suggest FAMMM<    5. Seborrheic keratoses  Reassurance provided    6. Solar lentigo  Reassurance provided    7. Nevus spilus  Reassurance provided      Procedures Performed:   - Cryotherapy procedure note, location(s): forehead and right ear. After verbal consent and discussion of risks and benefits including, but not limited to, dyspigmentation/scar, blister, and pain, 3 lesion(s) was(were) treated with 1-2 mm freeze border for 1-2 cycles with liquid nitrogen. Post cryotherapy instructions were provided.      Follow-up: 1 year(s) in-person, or earlier for new or changing lesions    Staff and Resident:     Alexis Carrington MD    Staff Physician Comments:   I saw and evaluated the patient with the resident and I agree with the assessment and plan.  I was present for the key portions of the above major procedure and examination. I have made edits if needed.    Pablo Jenkins MD  Staff Dermatologist and  Dermatopathologist  , Department of Dermatology      ____________________________________________    CC: changing mole, skin check    HPI:  Ms. Chayo Nathan is a(n) 46 year old female who presents today as a new patient for new/changing nevus.     - Lesion of concern on right upper back, over springtime became raised, later scabbed over and bled, then fell off  - Also lesion on concern on left flank, unchanged over time  - Occasional sunscreen use, likes to be outside  - Tanning bed use in teens, for 6 years total  - hx of blistering sunburns as childhood  - Sister with melanoma, dx in 20s  - Patient is otherwise feeling well, without additional skin concerns    Labs Reviewed:  N/A    Physical Exam:  Vitals: There were no vitals taken for this visit.  SKIN:   - Numerous light brown/brown colored macules scattered over face, trunk, and extremities   - Background of actinic damage and photoaging of face, neck  - Rough scaly mildly erythematous papules on helix of right ear, forehead  - Erythematous, pearly, 2-3mm papule on right medial leg, on dermoscopy crystalline structures visualized   - left foot with large hyperpigmented patch~4-5cm with scattered hypepigmented papules overlying c/w nevus spilus   - ~5mm waxy light brown plaque on left flank c/w SK  - two 1-2 mm black, slightly irregular shaped papules on mid abdomen, benign appearing on dermoscopy   - 2mm hyperkeratotic papule below left breast   - Peeling of skin of sun exposed areas  - No other lesions of concern on areas examined.     Medications:  Current Outpatient Medications   Medication     multivitamin w/minerals (MULTI-VITAMIN) tablet     sodium chloride 0.9 % SOLN 100 mL with natalizumab 300 MG/15ML CONC 300 mg     vitamin B-12 (CYANOCOBALAMIN) 2500 MCG sublingual tablet     vitamin C (ASCORBIC ACID) 1000 MG TABS     vitamin D3 (CHOLECALCIFEROL) 68199 units (250 mcg) capsule     baclofen (LIORESAL) 10 MG tablet     No current  facility-administered medications for this visit.       Past Medical History:   Patient Active Problem List   Diagnosis     Multiple sclerosis (H)     No past medical history on file.    CC Referred Self, MD  No address on file on close of this encounter.

## 2021-07-06 NOTE — PROGRESS NOTES
[facility] Dermatology Note  Encounter Date: Jul 6, 2021  Office Visit     Dermatology Problem List:  1. Neoplasm of uncertain behavior, right medial shin s/p shave biopsy 7/6/21, pathology pending  2. AK, right ear, forehead s/p cryo 7/6/21  3. Nevus spilus, left foot    ____________________________________________    Assessment & Plan:  1. Neoplasm of uncertain behavior  - right medial shin, s/p shave bx, concern for BCC vs SCC    2. AK (actinic keratosis)  - Cryo to R ear x1 and forehead x2    3. Diffuse photodamage of skin  - Counseled on sunscreen use, sun avoidance    4. Family hx of melanoma  - In sister, no other family members with pancreatic cancer or melanoma to suggest FAMMM<    5. Seborrheic keratoses  Reassurance provided    6. Solar lentigo  Reassurance provided    7. Nevus spilus  Reassurance provided      Procedures Performed:   - Cryotherapy procedure note, location(s): forehead and right ear. After verbal consent and discussion of risks and benefits including, but not limited to, dyspigmentation/scar, blister, and pain, 3 lesion(s) was(were) treated with 1-2 mm freeze border for 1-2 cycles with liquid nitrogen. Post cryotherapy instructions were provided.      Follow-up: 1 year(s) in-person, or earlier for new or changing lesions    Staff and Resident:     Alexis Carrington MD    Staff Physician Comments:   I saw and evaluated the patient with the resident and I agree with the assessment and plan.  I was present for the key portions of the above major procedure and examination. I have made edits if needed.    Pablo Jenkins MD  Staff Dermatologist and Dermatopathologist  , Department of Dermatology      ____________________________________________    CC: changing mole, skin check    HPI:  Ms. Chayo Nathan is a(n) 46 year old female who presents today as a new patient for new/changing nevus.     - Lesion of concern on right upper back, over springtime  became raised, later scabbed over and bled, then fell off  - Also lesion on concern on left flank, unchanged over time  - Occasional sunscreen use, likes to be outside  - Tanning bed use in teens, for 6 years total  - hx of blistering sunburns as childhood  - Sister with melanoma, dx in 20s  - Patient is otherwise feeling well, without additional skin concerns    Labs Reviewed:  N/A    Physical Exam:  Vitals: There were no vitals taken for this visit.  SKIN:   - Numerous light brown/brown colored macules scattered over face, trunk, and extremities   - Background of actinic damage and photoaging of face, neck  - Rough scaly mildly erythematous papules on helix of right ear, forehead  - Erythematous, pearly, 2-3mm papule on right medial leg, on dermoscopy crystalline structures visualized   - left foot with large hyperpigmented patch~4-5cm with scattered hypepigmented papules overlying c/w nevus spilus   - ~5mm waxy light brown plaque on left flank c/w SK  - two 1-2 mm black, slightly irregular shaped papules on mid abdomen, benign appearing on dermoscopy   - 2mm hyperkeratotic papule below left breast   - Peeling of skin of sun exposed areas  - No other lesions of concern on areas examined.     Medications:  Current Outpatient Medications   Medication     multivitamin w/minerals (MULTI-VITAMIN) tablet     sodium chloride 0.9 % SOLN 100 mL with natalizumab 300 MG/15ML CONC 300 mg     vitamin B-12 (CYANOCOBALAMIN) 2500 MCG sublingual tablet     vitamin C (ASCORBIC ACID) 1000 MG TABS     vitamin D3 (CHOLECALCIFEROL) 42484 units (250 mcg) capsule     baclofen (LIORESAL) 10 MG tablet     No current facility-administered medications for this visit.       Past Medical History:   Patient Active Problem List   Diagnosis     Multiple sclerosis (H)     No past medical history on file.    CC Referred Self, MD  No address on file on close of this encounter.

## 2021-07-06 NOTE — NURSING NOTE
Chief Complaint   Patient presents with     Mole     pt states she is here to EST and to have her moles, spot on right leg and  under left arm.      Bonnie Randall MA

## 2021-07-07 LAB — COPATH REPORT: NORMAL

## 2021-07-30 ENCOUNTER — INFUSION THERAPY VISIT (OUTPATIENT)
Dept: INFUSION THERAPY | Facility: CLINIC | Age: 46
End: 2021-07-30
Payer: OTHER GOVERNMENT

## 2021-07-30 VITALS
DIASTOLIC BLOOD PRESSURE: 68 MMHG | WEIGHT: 235.4 LBS | HEART RATE: 72 BPM | TEMPERATURE: 98.1 F | BODY MASS INDEX: 34.76 KG/M2 | SYSTOLIC BLOOD PRESSURE: 110 MMHG

## 2021-07-30 DIAGNOSIS — G35 MULTIPLE SCLEROSIS (H): Primary | ICD-10-CM

## 2021-07-30 PROCEDURE — 96365 THER/PROPH/DIAG IV INF INIT: CPT | Performed by: INTERNAL MEDICINE

## 2021-07-30 PROCEDURE — 99207 PR NO CHARGE LOS: CPT

## 2021-07-30 RX ORDER — HEPARIN SODIUM,PORCINE 10 UNIT/ML
5 VIAL (ML) INTRAVENOUS
Status: CANCELLED | OUTPATIENT
Start: 2021-08-27

## 2021-07-30 RX ORDER — HEPARIN SODIUM (PORCINE) LOCK FLUSH IV SOLN 100 UNIT/ML 100 UNIT/ML
5 SOLUTION INTRAVENOUS
Status: CANCELLED | OUTPATIENT
Start: 2021-08-27

## 2021-07-30 RX ADMIN — Medication 250 ML: at 09:28

## 2021-07-30 ASSESSMENT — PAIN SCALES - GENERAL: PAINLEVEL: NO PAIN (0)

## 2021-07-30 NOTE — PROGRESS NOTES
Infusion Nursing Note:  Chayo Nathan presents today for Tysabri.    Patient seen by provider today: No   present during visit today: Not Applicable.    Note: no MS flares since initiating Tysabri.  Tested + for LILIANE now, and is disappointed that she will need to change to another therapy some time by end of this year.    Intravenous Access:  Peripheral IV placed.    Treatment Conditions:  Tysabri pre-infusion checklist completed via touch program.      Post Infusion Assessment:  Patient tolerated infusion without incident.       Discharge Plan:   RTC as scheduled 8/30 for next Tysabri.      CHARMAINE ROLDAN RN

## 2021-08-05 RX ORDER — LIDOCAINE HYDROCHLORIDE AND EPINEPHRINE 10; 10 MG/ML; UG/ML
3 INJECTION, SOLUTION INFILTRATION; PERINEURAL ONCE
Status: ACTIVE | OUTPATIENT
Start: 2021-08-05

## 2021-08-11 ENCOUNTER — ANCILLARY PROCEDURE (OUTPATIENT)
Dept: MRI IMAGING | Facility: CLINIC | Age: 46
End: 2021-08-11
Attending: PSYCHIATRY & NEUROLOGY
Payer: OTHER GOVERNMENT

## 2021-08-11 ENCOUNTER — LAB (OUTPATIENT)
Dept: LAB | Facility: CLINIC | Age: 46
End: 2021-08-11

## 2021-08-11 DIAGNOSIS — G35 MS (MULTIPLE SCLEROSIS) (H): ICD-10-CM

## 2021-08-11 LAB
ALBUMIN SERPL-MCNC: 3.7 G/DL (ref 3.4–5)
ALP SERPL-CCNC: 68 U/L (ref 40–150)
ALT SERPL W P-5'-P-CCNC: 25 U/L (ref 0–50)
AST SERPL W P-5'-P-CCNC: 16 U/L (ref 0–45)
BASOPHILS # BLD AUTO: 0.1 10E3/UL (ref 0–0.2)
BASOPHILS NFR BLD AUTO: 1 %
BILIRUB DIRECT SERPL-MCNC: <0.1 MG/DL (ref 0–0.2)
BILIRUB SERPL-MCNC: 0.2 MG/DL (ref 0.2–1.3)
EOSINOPHIL # BLD AUTO: 0.3 10E3/UL (ref 0–0.7)
EOSINOPHIL NFR BLD AUTO: 3 %
ERYTHROCYTE [DISTWIDTH] IN BLOOD BY AUTOMATED COUNT: 16.2 % (ref 10–15)
HCT VFR BLD AUTO: 33.2 % (ref 35–47)
HGB BLD-MCNC: 9.8 G/DL (ref 11.7–15.7)
IMM GRANULOCYTES # BLD: 0 10E3/UL
IMM GRANULOCYTES NFR BLD: 0 %
LYMPHOCYTES # BLD AUTO: 3.6 10E3/UL (ref 0.8–5.3)
LYMPHOCYTES NFR BLD AUTO: 39 %
MCH RBC QN AUTO: 20.8 PG (ref 26.5–33)
MCHC RBC AUTO-ENTMCNC: 29.5 G/DL (ref 31.5–36.5)
MCV RBC AUTO: 71 FL (ref 78–100)
MONOCYTES # BLD AUTO: 0.8 10E3/UL (ref 0–1.3)
MONOCYTES NFR BLD AUTO: 9 %
NEUTROPHILS # BLD AUTO: 4.5 10E3/UL (ref 1.6–8.3)
NEUTROPHILS NFR BLD AUTO: 48 %
NRBC # BLD AUTO: 0 10E3/UL
NRBC BLD AUTO-RTO: 0 /100
PLATELET # BLD AUTO: 328 10E3/UL (ref 150–450)
PROT SERPL-MCNC: 7 G/DL (ref 6.8–8.8)
RBC # BLD AUTO: 4.71 10E6/UL (ref 3.8–5.2)
WBC # BLD AUTO: 9.4 10E3/UL (ref 4–11)

## 2021-08-11 PROCEDURE — 82306 VITAMIN D 25 HYDROXY: CPT | Mod: 90 | Performed by: PATHOLOGY

## 2021-08-11 PROCEDURE — 70553 MRI BRAIN STEM W/O & W/DYE: CPT | Performed by: RADIOLOGY

## 2021-08-11 PROCEDURE — 72157 MRI CHEST SPINE W/O & W/DYE: CPT | Mod: GC | Performed by: STUDENT IN AN ORGANIZED HEALTH CARE EDUCATION/TRAINING PROGRAM

## 2021-08-11 PROCEDURE — A9585 GADOBUTROL INJECTION: HCPCS | Performed by: RADIOLOGY

## 2021-08-11 PROCEDURE — 85025 COMPLETE CBC W/AUTO DIFF WBC: CPT | Performed by: PATHOLOGY

## 2021-08-11 PROCEDURE — 72156 MRI NECK SPINE W/O & W/DYE: CPT | Mod: GC | Performed by: STUDENT IN AN ORGANIZED HEALTH CARE EDUCATION/TRAINING PROGRAM

## 2021-08-11 PROCEDURE — 36415 COLL VENOUS BLD VENIPUNCTURE: CPT | Performed by: PATHOLOGY

## 2021-08-11 PROCEDURE — 80076 HEPATIC FUNCTION PANEL: CPT | Performed by: PATHOLOGY

## 2021-08-11 RX ORDER — GADOBUTROL 604.72 MG/ML
10 INJECTION INTRAVENOUS ONCE
Status: COMPLETED | OUTPATIENT
Start: 2021-08-11 | End: 2021-08-11

## 2021-08-11 RX ADMIN — GADOBUTROL 10 ML: 604.72 INJECTION INTRAVENOUS at 15:57

## 2021-08-11 NOTE — DISCHARGE INSTRUCTIONS
MRI Contrast Discharge Instructions    The IV contrast you received today will pass out of your body in your  urine. This will happen in the next 24 hours. You will not feel this process.  Your urine will not change color.    Drink at least 4 extra glasses of water or juice today (unless your doctor  has restricted your fluids). This reduces the stress on your kidneys.  You may take your regular medicines.    If you are on dialysis: It is best to have dialysis today.    If you have a reaction: Most reactions happen right away. If you have  any new symptoms after leaving the hospital (such as hives or swelling),  call your hospital at the correct number below. Or call your family doctor.  If you have breathing distress or wheezing, call 911.    Special instructions: ***    I have read and understand the above information.    Signature:______________________________________ Date:___________    Staff:__________________________________________ Date:___________     Time:__________    [facility] Departments:    ___[place]: [phone]  ___[facility]: [phone]  ___[place]: [phone] ___[facility]: [phone]  ___[facility]: [phone]  ___[facility]: [phone]  ___[phone]  ___[facility]: [phone]  ___[phone]

## 2021-08-11 NOTE — DISCHARGE INSTRUCTIONS
MRI Contrast Discharge Instructions    The IV contrast you received today will pass out of your body in your  urine. This will happen in the next 24 hours. You will not feel this process.  Your urine will not change color.    Drink at least 4 extra glasses of water or juice today (unless your doctor  has restricted your fluids). This reduces the stress on your kidneys.  You may take your regular medicines.    If you are on dialysis: It is best to have dialysis today.    If you have a reaction: Most reactions happen right away. If you have  any new symptoms after leaving the hospital (such as hives or swelling),  call your hospital at the correct number below. Or call your family doctor.  If you have breathing distress or wheezing, call 911.    Special instructions: ***    I have read and understand the above information.    Signature:______________________________________ Date:___________    Staff:__________________________________________ Date:___________     Time:__________    [facility] Departments:    ___[place]: [phone]  ___[facility]: [phone]  ___[place]: [phone] ___[facility]: [phone]  ___[facility]: [phone]  ___[facility]: [phone]  ___[phone]  ___[facility]: [phone]  ___[phone]

## 2021-08-11 NOTE — DISCHARGE INSTRUCTIONS
MRI Contrast Discharge Instructions    The IV contrast you received today will pass out of your body in your  urine. This will happen in the next 24 hours. You will not feel this process.  Your urine will not change color.    Drink at least 4 extra glasses of water or juice today (unless your doctor  has restricted your fluids). This reduces the stress on your kidneys.  You may take your regular medicines.    If you are on dialysis: It is best to have dialysis today.    If you have a reaction: Most reactions happen right away. If you have  any new symptoms after leaving the hospital (such as hives or swelling),  call your hospital at the correct number below. Or call your family doctor.  If you have breathing distress or wheezing, call 911.    Special instructions: ***    I have read and understand the above information.    Signature:______________________________________ Date:___________    Staff:__________________________________________ Date:___________     Time:__________    [facility] Departments:    ___[place]: [phone]  ___[facility]: [phone]  ___[place]: [phone] ___[facility]: [phone]  ___[facility]: [phone]  ___[facility]: [phone]  ___[phone]  ___[facility]: [phone]  ___[phone]

## 2021-08-12 LAB — DEPRECATED CALCIDIOL+CALCIFEROL SERPL-MC: 70 UG/L (ref 20–75)

## 2021-08-18 LAB — SCANNED LAB RESULT: ABNORMAL

## 2021-08-19 ENCOUNTER — VIRTUAL VISIT (OUTPATIENT)
Dept: NEUROLOGY | Facility: CLINIC | Age: 46
End: 2021-08-19
Attending: PSYCHIATRY & NEUROLOGY
Payer: OTHER GOVERNMENT

## 2021-08-19 ENCOUNTER — TELEPHONE (OUTPATIENT)
Dept: NEUROLOGY | Facility: CLINIC | Age: 46
End: 2021-08-19

## 2021-08-19 DIAGNOSIS — G35 MULTIPLE SCLEROSIS (H): Primary | ICD-10-CM

## 2021-08-19 PROCEDURE — 99214 OFFICE O/P EST MOD 30 MIN: CPT | Mod: GC | Performed by: PSYCHIATRY & NEUROLOGY

## 2021-08-19 NOTE — PROGRESS NOTES
Chayo is a 46 year old who is being evaluated via a billable video visit.      How would you like to obtain your AVS? Lelehart  If the video visit is dropped, the invitation should be resent by: Other e-mail: [email or web address]  Will anyone else be joining your video visit? No      Video Start Time: 09:02    Video-Visit Details    Type of service:  Video Visit    Video End Time: 09:33    Originating Location (pt. Location): Home    Distant Location (provider location):  [facility] [place]     Platform used for Video Visit: Mabaya    THE [facility]LE [facility]  FOLLOW UP VISIT               PRINCIPAL NEUROLOGIC DIAGNOSIS: Multiple Sclerosis           HISTORY OF ILLNESS:     This is a follow visit for this 46 year old right handed genetic female  With a history of relapsing-remitting MS. Who was last seen on  5/19/2021      At that time the patient was recommended to:  - Obtain repeat Brain, Cervical, and Thoracic MRI  - Hepatic Panel, CBC, and Vit D levels for general monitoring of Tysabri  - Repeat LILIANE virus testing to rule out potential for false positive    Since then pt has completed the above. While CBC, hepatic panel, and Vit D (70) are all fairly unremarkable and MRI's appear unchanged from prior, the pt's LILIANE virus unfortunately did return pos for a second time. She remains on Tysabri as of this visit with last infusion on 7/30/2021 (started in Jan 2020)    As to her symptoms, the pt reports that she feels great. She lost 50lbs after cutting out carbs/sugars from her diet and started Biotin supplements which she thinks has calmed things down. She is very happy with Tysabri and would like to continue with it if possible    She continues to deal with some balance issues, bilateral foot (L>R) numbness, and occasional bladder incontinence as per previous visit, but feels this has remained stable since last visit    Pt completed full COVID vaccine in Jan  2021       Current Symptoms:  1. R leg heaviness on exertion (3 mile walk)     Current Outpatient Prescriptions:    Current Outpatient Medications:      baclofen (LIORESAL) 10 MG tablet, Take 1 tablet (10 mg) by mouth At Bedtime, Disp: 30 tablet, Rfl: 3     Biotin 2500 MCG CAPS, Take by mouth daily, Disp: , Rfl:      multivitamin w/minerals (MULTI-VITAMIN) tablet, Take 1 tablet by mouth daily, Disp: , Rfl:      sodium chloride 0.9 % SOLN 100 mL with natalizumab 300 MG/15ML CONC 300 mg, Inject 300 mg into the vein once, Disp: , Rfl:      vitamin B-12 (CYANOCOBALAMIN) 2500 MCG sublingual tablet, Take 2,500 mcg by mouth daily, Disp: , Rfl:      vitamin C (ASCORBIC ACID) 1000 MG TABS, Take 1,000 mg by mouth daily, Disp: , Rfl:      vitamin D3 (CHOLECALCIFEROL) 14642 units (250 mcg) capsule, Take one tablet every other day, Disp: 15 capsule, Rfl: 11    Current Facility-Administered Medications:      lidocaine 1% with EPINEPHrine 1:100,000 injection 3 mL, 3 mL, Intradermal, Once, Pablo Jenkins MD             ALLERGIES             Allergies   Allergen Reactions     Bees Anaphylaxis     Ferumoxytol Difficulty breathing     Penicillin G Rash     Sulfa Drugs Rash               REVIEW OF SYSTEMS:     Comprehensive review of systems otherwise was negative, including constitutional, head and neck, cardiovascular, pulmonary, gastrointestinal, endocrine, urologic, reproductive, rheumatic, hematologic, immunologic, dermatologic, and psychiatric.     Nutritional concerns: None  Driving issues: None   Safety concerns regarding living situations and safety at home: None  Risk of falls: None  Pain: None     PHYSICAL EXAM:  Limited due to Video Visit        QUANTITATIVE SCORES:     Visual: 0-Normal  Brainstem: 0-Normal  Pyramidal: 2-minimal disability: patient complains of motor-fatigability or reduced performance in strenuous motor tasks (motor performance grade 1) and/or BMRC grade 4 in one or two muscle groups  Cerebellar:  0-Normal  Sensory: 0-Normal  Bladder/Bowel: 0-Normal  Cerebral: 0-Normal  Ambulatory: 0-Unrestricted     EDSS: 2.0- minimal disability in one FS (one FS grade 2, others 0 or 1)     ASSESSMENT:     Chayo Nathan is a 46yr old female with hx of relapsing-remitting MS most recently on Tysabri with good results and continues to be clinically and radiographically stable at this time. However, pt has now tested pos for the LILIANE virus twice (7/21/21; 8/11/21) and so will need to consider a new therapy in the near future. Would recommend starting Ocrevus or Uplizna or Lemtrada. However, may continue on Tysabri for the time being. Most recent Tysabri infusion was on 7/30/2021. Spoke about further options, going over for risks and benefits, and pt states she would prefer an infusion therapy. Will begin working on paperwork for Ocrevus and continue on Tysabri until then. Will need a wash-out period of 6 weeks between therapies. Spoke extensively on the risks and benefits of Ocrevus infusions and pt agrees with going forward with this therapy.     Also discussed that patient should plan to obtain a COVID booster vaccination prior to starting Ocrevus as this therapy will lead to immunocompromise      PLAN:   - Will continue Tysabri for now while working on paperwork to set unit(s) Ocrevus infusions. Will require a 6 week wash-out period between therapies  - Will send patient an information packet on Ocrevus   - Will obtain a Hepatitis panel  - Plan to get COVID booster prior to starting first Ocrevus infusion  - Plan to follow-up in 4 months (~1 month post first Ocrevus infusion)     I spent 30 minutes in this visit TODAY, with >50% direct patient time spent counseling about prognosis, treatment options, and coordination of care.      My recommendations will be communicated back to the patient's physician(s) by mail.  Follow-up is expected to be with me.        Maryann oHoks MD  Chief, Multiple Sclerosis Division  Department of  Neurology  [facility]

## 2021-08-19 NOTE — TELEPHONE ENCOUNTER
Patient portion of start form mailed to patient.  Provider portion placed in Dr. Hooks's folder for signature.    Chayo Shukla RN

## 2021-08-19 NOTE — LETTER
8/19/2021       RE: Chayo Nathan  [id number] [facility] [address]     Dear Colleague,    Thank you for referring your patient, Chayo Nathan, to the [facility] MULTIPLE SCLEROSIS CLINIC [place] at [facility]. Please see a copy of my visit note below.    THE [facility] MULTIPLE SCLEROSIS CLINIC  FOLLOW UP VISIT     PRINCIPAL NEUROLOGIC DIAGNOSIS: Multiple Sclerosis     HISTORY OF ILLNESS:     This is a follow visit for this 46 year old right handed genetic female  With a history of relapsing-remitting MS. Who was last seen on  5/19/2021      At that time the patient was recommended to:  - Obtain repeat Brain, Cervical, and Thoracic MRI  - Hepatic Panel, CBC, and Vit D levels for general monitoring of Tysabri  - Repeat LILIANE virus testing to rule out potential for false positive    Since then pt has completed the above. While CBC, hepatic panel, and Vit D (70) are all fairly unremarkable and MRI's appear unchanged from prior, the pt's LILIANE virus unfortunately did return pos for a second time. She remains on Tysabri as of this visit with last infusion on 7/30/2021 (started in Jan 2020)    As to her symptoms, the pt reports that she feels great. She lost 50lbs after cutting out carbs/sugars from her diet and started Biotin supplements which she thinks has calmed things down. She is very happy with Tysabri and would like to continue with it if possible    She continues to deal with some balance issues, bilateral foot (L>R) numbness, and occasional bladder incontinence as per previous visit, but feels this has remained stable since last visit    Pt completed full COVID vaccine in Jan 2021       Current Symptoms:  1. R leg heaviness on exertion (3 mile walk)     Current Outpatient Prescriptions:    Current Outpatient Medications:      baclofen (LIORESAL) 10 MG tablet, Take 1 tablet (10 mg) by mouth At Bedtime, Disp: 30 tablet, Rfl: 3      Biotin 2500 MCG CAPS, Take by mouth daily, Disp: , Rfl:      multivitamin w/minerals (MULTI-VITAMIN) tablet, Take 1 tablet by mouth daily, Disp: , Rfl:      sodium chloride 0.9 % SOLN 100 mL with natalizumab 300 MG/15ML CONC 300 mg, Inject 300 mg into the vein once, Disp: , Rfl:      vitamin B-12 (CYANOCOBALAMIN) 2500 MCG sublingual tablet, Take 2,500 mcg by mouth daily, Disp: , Rfl:      vitamin C (ASCORBIC ACID) 1000 MG TABS, Take 1,000 mg by mouth daily, Disp: , Rfl:      vitamin D3 (CHOLECALCIFEROL) 16390 units (250 mcg) capsule, Take one tablet every other day, Disp: 15 capsule, Rfl: 11    Current Facility-Administered Medications:      lidocaine 1% with EPINEPHrine 1:100,000 injection 3 mL, 3 mL, Intradermal, Once, Pablo Jenkins MD             ALLERGIES             Allergies   Allergen Reactions     Bees Anaphylaxis     Ferumoxytol Difficulty breathing     Penicillin G Rash     Sulfa Drugs Rash               REVIEW OF SYSTEMS:     Comprehensive review of systems otherwise was negative, including constitutional, head and neck, cardiovascular, pulmonary, gastrointestinal, endocrine, urologic, reproductive, rheumatic, hematologic, immunologic, dermatologic, and psychiatric.     Nutritional concerns: None  Driving issues: None   Safety concerns regarding living situations and safety at home: None  Risk of falls: None  Pain: None     PHYSICAL EXAM:  Limited due to Video Visit        QUANTITATIVE SCORES:     Visual: 0-Normal  Brainstem: 0-Normal  Pyramidal: 2-minimal disability: patient complains of motor-fatigability or reduced performance in strenuous motor tasks (motor performance grade 1) and/or BMRC grade 4 in one or two muscle groups  Cerebellar: 0-Normal  Sensory: 0-Normal  Bladder/Bowel: 0-Normal  Cerebral: 0-Normal  Ambulatory: 0-Unrestricted     EDSS: 2.0- minimal disability in one FS (one FS grade 2, others 0 or 1)     ASSESSMENT:     Chayo Nathan is a 46yr old female with hx of relapsing-remitting  MS most recently on Tysabri with good results and continues to be clinically and radiographically stable at this time. However, pt has now tested pos for the LILIANE virus twice (7/21/21; 8/11/21) and so will need to consider a new therapy in the near future. Would recommend starting Ocrevus or Uplizna or Lemtrada. However, may continue on Tysabri for the time being. Most recent Tysabri infusion was on 7/30/2021. Spoke about further options, going over for risks and benefits, and pt states she would prefer an infusion therapy. Will begin working on paperwork for Ocrevus and continue on Tysabri until then. Will need a wash-out period of 6 weeks between therapies. Spoke extensively on the risks and benefits of Ocrevus infusions and pt agrees with going forward with this therapy.     Also discussed that patient should plan to obtain a COVID booster vaccination prior to starting Ocrevus as this therapy will lead to immunocompromise      PLAN:   - Will continue Tysabri for now while working on paperwork to set unit(s) Ocrevus infusions. Will require a 6 week wash-out period between therapies  - Will send patient an information packet on Ocrevus   - Will obtain a Hepatitis panel  - Plan to get COVID booster prior to starting first Ocrevus infusion  - Plan to follow-up in 4 months (~1 month post first Ocrevus infusion)     I spent 30 minutes in this visit TODAY, with >50% direct patient time spent counseling about prognosis, treatment options, and coordination of care.      My recommendations will be communicated back to the patient's physician(s) by mail.  Follow-up is expected to be with me.        Maryann Hooks MD  Chief, Multiple Sclerosis Division  Department of Neurology  [facility]        Again, thank you for allowing me to participate in the care of your patient.      Sincerely,    Maryann Hooks MD

## 2021-08-30 ENCOUNTER — INFUSION THERAPY VISIT (OUTPATIENT)
Dept: INFUSION THERAPY | Facility: CLINIC | Age: 46
End: 2021-08-30
Payer: OTHER GOVERNMENT

## 2021-08-30 VITALS
DIASTOLIC BLOOD PRESSURE: 77 MMHG | BODY MASS INDEX: 33.48 KG/M2 | RESPIRATION RATE: 18 BRPM | WEIGHT: 226.7 LBS | TEMPERATURE: 98.3 F | HEART RATE: 76 BPM | SYSTOLIC BLOOD PRESSURE: 111 MMHG | OXYGEN SATURATION: 98 %

## 2021-08-30 DIAGNOSIS — G35 MULTIPLE SCLEROSIS (H): Primary | ICD-10-CM

## 2021-08-30 PROCEDURE — 96365 THER/PROPH/DIAG IV INF INIT: CPT | Performed by: NURSE PRACTITIONER

## 2021-08-30 PROCEDURE — 99207 PR NO CHARGE LOS: CPT

## 2021-08-30 RX ORDER — HEPARIN SODIUM (PORCINE) LOCK FLUSH IV SOLN 100 UNIT/ML 100 UNIT/ML
5 SOLUTION INTRAVENOUS
Status: CANCELLED | OUTPATIENT
Start: 2021-09-24

## 2021-08-30 RX ORDER — HEPARIN SODIUM,PORCINE 10 UNIT/ML
5 VIAL (ML) INTRAVENOUS
Status: CANCELLED | OUTPATIENT
Start: 2021-09-24

## 2021-08-30 RX ADMIN — Medication 250 ML: at 08:59

## 2021-08-31 ENCOUNTER — TELEPHONE (OUTPATIENT)
Dept: NEUROLOGY | Facility: CLINIC | Age: 46
End: 2021-08-31

## 2021-08-31 NOTE — TELEPHONE ENCOUNTER
Prior Authorization Infusion/Clinic Administered Request    Location: [facility]  Diagnosis and ICD:Multiple Sclerosis, G35  Drug/Therapy: Ocrevus 300 mg day 1 and day 15    Previously Tried and Failed Therapies: Tysabri    Date of provider note with supporting information: 08/19/21    Urgency (When is the patient scheduled?):     Would you like to include any research articles?         If yes please call [phone] for further instructions about sending that information     RESTART Advair - use two times daily    TRY the duoneb IN PLACE OF ALBUTEROL as needed during the day up to every 6 hours. If not at home, bring the inhaler with you

## 2021-08-31 NOTE — TELEPHONE ENCOUNTER
Start form faxed to [employer]. Ocrevus therapy plan routed to Dr. Hooks for signature.    Chayo Shukla RN

## 2021-09-01 RX ORDER — ALBUTEROL SULFATE 0.83 MG/ML
2.5 SOLUTION RESPIRATORY (INHALATION)
Status: CANCELLED | OUTPATIENT
Start: 2021-09-01

## 2021-09-01 RX ORDER — DIPHENHYDRAMINE HCL 25 MG
50 CAPSULE ORAL ONCE
Status: CANCELLED | OUTPATIENT
Start: 2021-09-01

## 2021-09-01 RX ORDER — ACETAMINOPHEN 325 MG/1
650 TABLET ORAL ONCE
Status: CANCELLED | OUTPATIENT
Start: 2021-09-01

## 2021-09-01 RX ORDER — DIPHENHYDRAMINE HYDROCHLORIDE 50 MG/ML
50 INJECTION INTRAMUSCULAR; INTRAVENOUS
Status: CANCELLED
Start: 2021-09-01

## 2021-09-01 RX ORDER — EPINEPHRINE 1 MG/ML
0.3 INJECTION, SOLUTION, CONCENTRATE INTRAVENOUS EVERY 5 MIN PRN
Status: CANCELLED | OUTPATIENT
Start: 2021-09-01

## 2021-09-01 RX ORDER — HEPARIN SODIUM (PORCINE) LOCK FLUSH IV SOLN 100 UNIT/ML 100 UNIT/ML
5 SOLUTION INTRAVENOUS
Status: CANCELLED | OUTPATIENT
Start: 2021-09-01

## 2021-09-01 RX ORDER — METHYLPREDNISOLONE SODIUM SUCCINATE 125 MG/2ML
125 INJECTION, POWDER, LYOPHILIZED, FOR SOLUTION INTRAMUSCULAR; INTRAVENOUS
Status: CANCELLED
Start: 2021-09-01

## 2021-09-01 RX ORDER — NALOXONE HYDROCHLORIDE 0.4 MG/ML
0.2 INJECTION, SOLUTION INTRAMUSCULAR; INTRAVENOUS; SUBCUTANEOUS
Status: CANCELLED | OUTPATIENT
Start: 2021-09-01

## 2021-09-01 RX ORDER — MEPERIDINE HYDROCHLORIDE 25 MG/ML
25 INJECTION INTRAMUSCULAR; INTRAVENOUS; SUBCUTANEOUS EVERY 30 MIN PRN
Status: CANCELLED | OUTPATIENT
Start: 2021-09-01

## 2021-09-01 RX ORDER — HEPARIN SODIUM,PORCINE 10 UNIT/ML
5 VIAL (ML) INTRAVENOUS
Status: CANCELLED | OUTPATIENT
Start: 2021-09-01

## 2021-09-01 RX ORDER — METHYLPREDNISOLONE SODIUM SUCCINATE 125 MG/2ML
125 INJECTION, POWDER, LYOPHILIZED, FOR SOLUTION INTRAMUSCULAR; INTRAVENOUS ONCE
Status: CANCELLED | OUTPATIENT
Start: 2021-09-01

## 2021-09-01 RX ORDER — ALBUTEROL SULFATE 90 UG/1
1-2 AEROSOL, METERED RESPIRATORY (INHALATION)
Status: CANCELLED
Start: 2021-09-01

## 2021-10-08 NOTE — TELEPHONE ENCOUNTER
Hepatitis B labs ordered and should be drawn before Ocrevus infusion. Chayo notified via my chart.    Chayo Shukla RN

## 2021-10-10 ENCOUNTER — HEALTH MAINTENANCE LETTER (OUTPATIENT)
Age: 46
End: 2021-10-10

## 2021-10-15 ENCOUNTER — LAB (OUTPATIENT)
Dept: LAB | Facility: CLINIC | Age: 46
End: 2021-10-15
Payer: OTHER GOVERNMENT

## 2021-10-15 ENCOUNTER — INFUSION THERAPY VISIT (OUTPATIENT)
Dept: INFUSION THERAPY | Facility: CLINIC | Age: 46
End: 2021-10-15
Payer: OTHER GOVERNMENT

## 2021-10-15 VITALS
OXYGEN SATURATION: 98 % | WEIGHT: 228.7 LBS | BODY MASS INDEX: 33.77 KG/M2 | RESPIRATION RATE: 16 BRPM | HEART RATE: 64 BPM | TEMPERATURE: 97.6 F | SYSTOLIC BLOOD PRESSURE: 104 MMHG | DIASTOLIC BLOOD PRESSURE: 70 MMHG

## 2021-10-15 DIAGNOSIS — G35 MULTIPLE SCLEROSIS (H): Primary | ICD-10-CM

## 2021-10-15 DIAGNOSIS — G35 MULTIPLE SCLEROSIS (H): ICD-10-CM

## 2021-10-15 LAB
HBV CORE IGM SERPL QL IA: NONREACTIVE
HBV SURFACE AG SERPL QL IA: NONREACTIVE
HOLD SPECIMEN: NORMAL
HOLD SPECIMEN: NORMAL

## 2021-10-15 PROCEDURE — 96415 CHEMO IV INFUSION ADDL HR: CPT | Performed by: INTERNAL MEDICINE

## 2021-10-15 PROCEDURE — 99207 PR NO CHARGE LOS: CPT

## 2021-10-15 PROCEDURE — 87340 HEPATITIS B SURFACE AG IA: CPT

## 2021-10-15 PROCEDURE — 86705 HEP B CORE ANTIBODY IGM: CPT

## 2021-10-15 PROCEDURE — 96375 TX/PRO/DX INJ NEW DRUG ADDON: CPT | Performed by: INTERNAL MEDICINE

## 2021-10-15 PROCEDURE — 36415 COLL VENOUS BLD VENIPUNCTURE: CPT

## 2021-10-15 PROCEDURE — 96413 CHEMO IV INFUSION 1 HR: CPT | Performed by: INTERNAL MEDICINE

## 2021-10-15 RX ORDER — METHYLPREDNISOLONE SODIUM SUCCINATE 125 MG/2ML
125 INJECTION, POWDER, LYOPHILIZED, FOR SOLUTION INTRAMUSCULAR; INTRAVENOUS
Status: CANCELLED
Start: 2021-10-29

## 2021-10-15 RX ORDER — NALOXONE HYDROCHLORIDE 0.4 MG/ML
0.2 INJECTION, SOLUTION INTRAMUSCULAR; INTRAVENOUS; SUBCUTANEOUS
Status: CANCELLED | OUTPATIENT
Start: 2021-10-29

## 2021-10-15 RX ORDER — ALBUTEROL SULFATE 0.83 MG/ML
2.5 SOLUTION RESPIRATORY (INHALATION)
Status: CANCELLED | OUTPATIENT
Start: 2021-10-29

## 2021-10-15 RX ORDER — ACETAMINOPHEN 325 MG/1
650 TABLET ORAL ONCE
Status: CANCELLED | OUTPATIENT
Start: 2021-10-29

## 2021-10-15 RX ORDER — ACETAMINOPHEN 325 MG/1
650 TABLET ORAL ONCE
Status: COMPLETED | OUTPATIENT
Start: 2021-10-15 | End: 2021-10-15

## 2021-10-15 RX ORDER — ALBUTEROL SULFATE 90 UG/1
1-2 AEROSOL, METERED RESPIRATORY (INHALATION)
Status: CANCELLED
Start: 2021-10-29

## 2021-10-15 RX ORDER — METHYLPREDNISOLONE SODIUM SUCCINATE 125 MG/2ML
125 INJECTION, POWDER, LYOPHILIZED, FOR SOLUTION INTRAMUSCULAR; INTRAVENOUS ONCE
Status: COMPLETED | OUTPATIENT
Start: 2021-10-15 | End: 2021-10-15

## 2021-10-15 RX ORDER — DIPHENHYDRAMINE HCL 25 MG
50 CAPSULE ORAL ONCE
Status: COMPLETED | OUTPATIENT
Start: 2021-10-15 | End: 2021-10-15

## 2021-10-15 RX ORDER — EPINEPHRINE 1 MG/ML
0.3 INJECTION, SOLUTION INTRAMUSCULAR; SUBCUTANEOUS EVERY 5 MIN PRN
Status: CANCELLED | OUTPATIENT
Start: 2021-10-29

## 2021-10-15 RX ORDER — METHYLPREDNISOLONE SODIUM SUCCINATE 125 MG/2ML
125 INJECTION, POWDER, LYOPHILIZED, FOR SOLUTION INTRAMUSCULAR; INTRAVENOUS ONCE
Status: CANCELLED | OUTPATIENT
Start: 2021-10-29

## 2021-10-15 RX ORDER — HEPARIN SODIUM (PORCINE) LOCK FLUSH IV SOLN 100 UNIT/ML 100 UNIT/ML
5 SOLUTION INTRAVENOUS
Status: CANCELLED | OUTPATIENT
Start: 2021-10-29

## 2021-10-15 RX ORDER — DIPHENHYDRAMINE HCL 25 MG
50 CAPSULE ORAL ONCE
Status: CANCELLED | OUTPATIENT
Start: 2021-10-29

## 2021-10-15 RX ORDER — MEPERIDINE HYDROCHLORIDE 25 MG/ML
25 INJECTION INTRAMUSCULAR; INTRAVENOUS; SUBCUTANEOUS EVERY 30 MIN PRN
Status: CANCELLED | OUTPATIENT
Start: 2021-10-29

## 2021-10-15 RX ORDER — DIPHENHYDRAMINE HYDROCHLORIDE 50 MG/ML
50 INJECTION INTRAMUSCULAR; INTRAVENOUS
Status: CANCELLED
Start: 2021-10-29

## 2021-10-15 RX ORDER — HEPARIN SODIUM,PORCINE 10 UNIT/ML
5 VIAL (ML) INTRAVENOUS
Status: CANCELLED | OUTPATIENT
Start: 2021-10-29

## 2021-10-15 RX ADMIN — METHYLPREDNISOLONE SODIUM SUCCINATE 125 MG: 125 INJECTION INTRAMUSCULAR; INTRAVENOUS at 08:33

## 2021-10-15 RX ADMIN — Medication 250 ML: at 08:30

## 2021-10-15 RX ADMIN — ACETAMINOPHEN 650 MG: 325 TABLET ORAL at 08:15

## 2021-10-15 RX ADMIN — Medication 50 MG: at 08:15

## 2021-10-15 ASSESSMENT — PAIN SCALES - GENERAL: PAINLEVEL: NO PAIN (0)

## 2021-10-15 NOTE — PROGRESS NOTES
Infusion Nursing Note:  Chayo Nathan presents today for NEW Ocrevus.   Patient seen by provider today: No   present during visit today: Not Applicable.    Note: Reviewed Ocrevus information with patient. Pt states feeling more fatigue due to stopping Tysabri a few weeks ago. States her balance is more off but denies any falls.    Intravenous Access:  Peripheral IV placed.    Treatment Conditions:  Biological Infusion Checklist:  ~~~ NOTE: If the patient answers yes to any of the questions below, hold the infusion and contact ordering provider or on-call provider.    1. Have you recently had an elevated temperature, fever, chills, productive cough, coughing for 3 weeks or longer or hemoptysis, abnormal vital signs, night sweats,  chest pain or have you noticed a decrease in your appetite, unexplained weight loss or fatigue? No  2. Do you have any open wounds or new incisions? No  3. Do you have any recent or upcoming hospitalizations, surgeries or dental procedures? No  4. Do you currently have or recently have had any signs of illness or infection or are you on any antibiotics? No  5. Have you had any new, sudden or worsening abdominal pain? No  6. Have you or anyone in your household received a live vaccination in the past 4 weeks? Please note:  No live vaccines while on biologic/chemotherapy until 6 months after the last treatment.  Patient can receive the flu vaccine (shot only) and the pneumovax.  It is optimal for the patient to get these vaccines mid cycle, but they can be given at any time as long as it is not on the day of the infusion. No  7. Have you recently been diagnosed with any new nervous system diseases (ie. Multiple sclerosis, Guillain [place], seizures, neurological changes) or cancer diagnosis? No  8. Are you on any form of radiation or chemotherapy? No  9. Are you pregnant or breast feeding or do you have plans of pregnancy in the future? No  10. Have you been having any signs of  worsening depression or suicidal ideations?  (benlysta only) No  11. Have there been any other new onset medical symptoms? No    Post Infusion Assessment:  Patient tolerated infusion without incident.  Patient observed for 60 minutes post Ocrevus per protocol.  Site patent and intact, free from redness, edema or discomfort.  No evidence of extravasations.  Access discontinued per protocol.       Discharge Plan:   Patient will return in 2 weeks for next appointment. Patient directed to scheduling.  Patient discharged in stable condition accompanied by: self.  Departure Mode: Ambulatory.      Shirley Crawford RN

## 2021-11-02 ENCOUNTER — INFUSION THERAPY VISIT (OUTPATIENT)
Dept: INFUSION THERAPY | Facility: CLINIC | Age: 46
End: 2021-11-02
Payer: OTHER GOVERNMENT

## 2021-11-02 VITALS
HEART RATE: 73 BPM | OXYGEN SATURATION: 100 % | WEIGHT: 232.1 LBS | TEMPERATURE: 98.1 F | SYSTOLIC BLOOD PRESSURE: 132 MMHG | DIASTOLIC BLOOD PRESSURE: 69 MMHG | BODY MASS INDEX: 34.28 KG/M2

## 2021-11-02 DIAGNOSIS — G35 MULTIPLE SCLEROSIS (H): Primary | ICD-10-CM

## 2021-11-02 PROCEDURE — 96415 CHEMO IV INFUSION ADDL HR: CPT | Performed by: NURSE PRACTITIONER

## 2021-11-02 PROCEDURE — 99207 PR NO CHARGE LOS: CPT

## 2021-11-02 PROCEDURE — 96413 CHEMO IV INFUSION 1 HR: CPT | Performed by: NURSE PRACTITIONER

## 2021-11-02 PROCEDURE — 96375 TX/PRO/DX INJ NEW DRUG ADDON: CPT | Performed by: NURSE PRACTITIONER

## 2021-11-02 RX ORDER — HEPARIN SODIUM (PORCINE) LOCK FLUSH IV SOLN 100 UNIT/ML 100 UNIT/ML
5 SOLUTION INTRAVENOUS
Status: CANCELLED | OUTPATIENT
Start: 2021-11-12

## 2021-11-02 RX ORDER — MEPERIDINE HYDROCHLORIDE 25 MG/ML
25 INJECTION INTRAMUSCULAR; INTRAVENOUS; SUBCUTANEOUS EVERY 30 MIN PRN
Status: CANCELLED | OUTPATIENT
Start: 2021-11-12

## 2021-11-02 RX ORDER — EPINEPHRINE 1 MG/ML
0.3 INJECTION, SOLUTION INTRAMUSCULAR; SUBCUTANEOUS EVERY 5 MIN PRN
Status: CANCELLED | OUTPATIENT
Start: 2021-11-12

## 2021-11-02 RX ORDER — DIPHENHYDRAMINE HYDROCHLORIDE 50 MG/ML
50 INJECTION INTRAMUSCULAR; INTRAVENOUS
Status: CANCELLED
Start: 2021-11-12

## 2021-11-02 RX ORDER — METHYLPREDNISOLONE SODIUM SUCCINATE 125 MG/2ML
125 INJECTION, POWDER, LYOPHILIZED, FOR SOLUTION INTRAMUSCULAR; INTRAVENOUS ONCE
Status: CANCELLED | OUTPATIENT
Start: 2021-11-12

## 2021-11-02 RX ORDER — HEPARIN SODIUM,PORCINE 10 UNIT/ML
5 VIAL (ML) INTRAVENOUS
Status: CANCELLED | OUTPATIENT
Start: 2021-11-12

## 2021-11-02 RX ORDER — NALOXONE HYDROCHLORIDE 0.4 MG/ML
0.2 INJECTION, SOLUTION INTRAMUSCULAR; INTRAVENOUS; SUBCUTANEOUS
Status: CANCELLED | OUTPATIENT
Start: 2021-11-12

## 2021-11-02 RX ORDER — DIPHENHYDRAMINE HCL 25 MG
50 CAPSULE ORAL ONCE
Status: CANCELLED | OUTPATIENT
Start: 2021-11-12

## 2021-11-02 RX ORDER — METHYLPREDNISOLONE SODIUM SUCCINATE 125 MG/2ML
125 INJECTION, POWDER, LYOPHILIZED, FOR SOLUTION INTRAMUSCULAR; INTRAVENOUS
Status: CANCELLED
Start: 2021-11-12

## 2021-11-02 RX ORDER — ACETAMINOPHEN 325 MG/1
650 TABLET ORAL ONCE
Status: CANCELLED | OUTPATIENT
Start: 2021-11-12

## 2021-11-02 RX ORDER — ALBUTEROL SULFATE 90 UG/1
1-2 AEROSOL, METERED RESPIRATORY (INHALATION)
Status: CANCELLED
Start: 2021-11-12

## 2021-11-02 RX ORDER — DIPHENHYDRAMINE HCL 25 MG
50 CAPSULE ORAL ONCE
Status: COMPLETED | OUTPATIENT
Start: 2021-11-02 | End: 2021-11-02

## 2021-11-02 RX ORDER — ACETAMINOPHEN 325 MG/1
650 TABLET ORAL ONCE
Status: COMPLETED | OUTPATIENT
Start: 2021-11-02 | End: 2021-11-02

## 2021-11-02 RX ORDER — ALBUTEROL SULFATE 0.83 MG/ML
2.5 SOLUTION RESPIRATORY (INHALATION)
Status: CANCELLED | OUTPATIENT
Start: 2021-11-12

## 2021-11-02 RX ORDER — METHYLPREDNISOLONE SODIUM SUCCINATE 125 MG/2ML
125 INJECTION, POWDER, LYOPHILIZED, FOR SOLUTION INTRAMUSCULAR; INTRAVENOUS ONCE
Status: COMPLETED | OUTPATIENT
Start: 2021-11-02 | End: 2021-11-02

## 2021-11-02 RX ADMIN — METHYLPREDNISOLONE SODIUM SUCCINATE 125 MG: 125 INJECTION INTRAMUSCULAR; INTRAVENOUS at 09:08

## 2021-11-02 RX ADMIN — Medication 250 ML: at 08:57

## 2021-11-02 RX ADMIN — ACETAMINOPHEN 650 MG: 325 TABLET ORAL at 08:43

## 2021-11-02 RX ADMIN — Medication 50 MG: at 08:43

## 2021-11-02 ASSESSMENT — PAIN SCALES - GENERAL: PAINLEVEL: NO PAIN (0)

## 2021-11-02 NOTE — PROGRESS NOTES
Infusion Nursing Note:  Chayo Nathan presents today for Ocrevus D15.    Patient seen by provider today: No   present during visit today: Not Applicable.    Note: Pt admits to a terrible headache lasting for 36hours after last Ocrevus, headache improved with ibuprofen.  Pt plans to take ibuprofen prn after ocrevus today.  See flow sheet for assessment.  Ocrevus rates:  30mls/hr x 30 mins       60mls/hr x 30mins       90mls/hr x 30mins      120mls/hr x 30mins      150mls/hr x 30mins      180 mls/hr x 30mins      Intravenous Access:  Peripheral IV placed.    Treatment Conditions:  Biological Infusion Checklist:  ~~~ NOTE: If the patient answers yes to any of the questions below, hold the infusion and contact ordering provider or on-call provider.    1. Have you recently had an elevated temperature, fever, chills, productive cough, coughing for 3 weeks or longer or hemoptysis, abnormal vital signs, night sweats,  chest pain or have you noticed a decrease in your appetite, unexplained weight loss or fatigue? No  2. Do you have any open wounds or new incisions? No  3. Do you have any recent or upcoming hospitalizations, surgeries or dental procedures? No  4. Do you currently have or recently have had any signs of illness or infection or are you on any antibiotics? No  5. Have you had any new, sudden or worsening abdominal pain? No  6. Have you or anyone in your household received a live vaccination in the past 4 weeks? Please note:  No live vaccines while on biologic/chemotherapy until 6 months after the last treatment.  Patient can receive the flu vaccine (shot only) and the pneumovax.  It is optimal for the patient to get these vaccines mid cycle, but they can be given at any time as long as it is not on the day of the infusion. No  7. Have you recently been diagnosed with any new nervous system diseases (ie. Multiple sclerosis, Guillain [place], seizures, neurological changes) or cancer diagnosis? No  8. Are  you on any form of radiation or chemotherapy? No  9. Are you pregnant or breast feeding or do you have plans of pregnancy in the future? No  10. Have you been having any signs of worsening depression or suicidal ideations?  (benlysta only) No  11. Have there been any other new onset medical symptoms? No        Post Infusion Assessment:  Patient tolerated infusion without incident.  Patient observed for 60 minutes post Ocrevus per protocol.  Blood return noted pre and post infusion.  Site patent and intact, free from redness, edema or discomfort.  No evidence of extravasations.  Access discontinued per protocol.       Discharge Plan:   Patient discharged in stable condition accompanied by: self.  Departure Mode: Ambulatory.  Pt will RTC in 6 months for next Ocrevus.      Ousmane Castellano RN

## 2021-12-14 ENCOUNTER — LAB REQUISITION (OUTPATIENT)
Dept: LAB | Facility: CLINIC | Age: 46
End: 2021-12-14

## 2021-12-14 PROCEDURE — 86762 RUBELLA ANTIBODY: CPT | Performed by: INTERNAL MEDICINE

## 2021-12-14 PROCEDURE — 86481 TB AG RESPONSE T-CELL SUSP: CPT | Performed by: INTERNAL MEDICINE

## 2021-12-14 PROCEDURE — 86787 VARICELLA-ZOSTER ANTIBODY: CPT | Performed by: INTERNAL MEDICINE

## 2021-12-14 PROCEDURE — 86706 HEP B SURFACE ANTIBODY: CPT | Performed by: INTERNAL MEDICINE

## 2021-12-14 PROCEDURE — 86765 RUBEOLA ANTIBODY: CPT | Performed by: INTERNAL MEDICINE

## 2021-12-14 PROCEDURE — 86735 MUMPS ANTIBODY: CPT | Performed by: INTERNAL MEDICINE

## 2021-12-15 LAB
HBV SURFACE AB SERPL IA-ACNC: 50.75 M[IU]/ML
MEV IGG SER IA-ACNC: <5 AU/ML
MEV IGG SER IA-ACNC: NORMAL
MUMPS ANTIBODY IGG INSTRUMENT VALUE: 40.9 AU/ML
MUV IGG SER QL IA: POSITIVE
RUBV IGG SERPL QL IA: 1.71 INDEX
RUBV IGG SERPL QL IA: POSITIVE
VZV IGG SER QL IA: >4000 INDEX
VZV IGG SER QL IA: POSITIVE

## 2021-12-16 LAB
GAMMA INTERFERON BACKGROUND BLD IA-ACNC: 0 IU/ML
M TB IFN-G BLD-IMP: NEGATIVE
M TB IFN-G CD4+ BCKGRND COR BLD-ACNC: 10 IU/ML
MITOGEN IGNF BCKGRD COR BLD-ACNC: 0 IU/ML
MITOGEN IGNF BCKGRD COR BLD-ACNC: 0 IU/ML
QUANTIFERON MITOGEN: 10 IU/ML
QUANTIFERON NIL TUBE: 0 IU/ML
QUANTIFERON TB1 TUBE: 0 IU/ML
QUANTIFERON TB2 TUBE: 0

## 2022-01-06 ENCOUNTER — VIRTUAL VISIT (OUTPATIENT)
Dept: NEUROLOGY | Facility: CLINIC | Age: 47
End: 2022-01-06
Attending: PSYCHIATRY & NEUROLOGY
Payer: OTHER GOVERNMENT

## 2022-01-06 DIAGNOSIS — G35 MULTIPLE SCLEROSIS (H): Primary | ICD-10-CM

## 2022-01-06 PROCEDURE — 99207 PR SATISFY VISIT NUMBER: CPT | Performed by: PSYCHIATRY & NEUROLOGY

## 2022-01-06 NOTE — LETTER
1/6/2022       RE: Chayo Nathan  [id number] [facility] [address]     Dear Colleague,    Thank you for referring your patient, Chayo Nathan, to the [facility] MULTIPLE SCLEROSIS CLINIC [facility]. Please see a copy of my visit note below.    No show      Again, thank you for allowing me to participate in the care of your patient.      Sincerely,    Maryann Hooks MD

## 2022-01-17 ENCOUNTER — LAB REQUISITION (OUTPATIENT)
Dept: LAB | Facility: CLINIC | Age: 47
End: 2022-01-17

## 2022-01-17 LAB — SARS-COV-2 RNA RESP QL NAA+PROBE: POSITIVE

## 2022-01-17 PROCEDURE — U0005 INFEC AGEN DETEC AMPLI PROBE: HCPCS | Performed by: INTERNAL MEDICINE

## 2022-01-20 ENCOUNTER — VIRTUAL VISIT (OUTPATIENT)
Dept: NEUROLOGY | Facility: CLINIC | Age: 47
End: 2022-01-20
Attending: PSYCHIATRY & NEUROLOGY
Payer: OTHER GOVERNMENT

## 2022-01-20 DIAGNOSIS — G35 MULTIPLE SCLEROSIS (H): Primary | ICD-10-CM

## 2022-01-20 PROCEDURE — 99213 OFFICE O/P EST LOW 20 MIN: CPT | Mod: 95 | Performed by: PSYCHIATRY & NEUROLOGY

## 2022-01-20 PROCEDURE — G0463 HOSPITAL OUTPT CLINIC VISIT: HCPCS | Mod: PN,RTG | Performed by: PSYCHIATRY & NEUROLOGY

## 2022-01-20 NOTE — LETTER
1/20/2022       RE: Chayo Nathan  [address] [place] [address]     Dear Colleague,    Thank you for referring your patient, Chayo Nathan, to the [facility] MULTIPLE SCLEROSIS CLINIC [place] at [facility]. Please see a copy of my visit note below.    THE [facility] MULTIPLE SCLEROSIS CLINIC  FOLLOW UP VISIT         PRINCIPAL NEUROLOGIC DIAGNOSIS: Multiple Sclerosis        HISTORY OF ILLNESS:    This is a follow visit for this 47 year old right handed genetic female  With a history of MS. Who was last seen on 8-19-21.     At that time the patient was recommended to:    ASSESSMENT:     Chayo Nathan is a 46yr old female with hx of relapsing-remitting MS most recently on Tysabri with good results and continues to be clinically and radiographically stable at this time. However, pt has now tested pos for the LILIANE virus twice (7/21/21; 8/11/21) and so will need to consider a new therapy in the near future.     Would recommend starting Ocrevus or Uplizna or Lemtrada. However, may continue on Tysabri for the time being. Most recent Tysabri infusion was on 7/30/2021. Spoke about further options, going over for risks and benefits, and pt states she would prefer an infusion therapy.     Will begin working on paperwork for Ocrevus and continue on Tysabri until then. Will need a wash-out period of 6 weeks between therapies. Spoke extensively on the risks and benefits of Ocrevus infusions and pt agrees with going forward with this therapy.      Also discussed that patient should plan to obtain a COVID booster vaccination prior to starting Ocrevus as this therapy will lead to immunocompromise      PLAN:   - Will continue Tysabri for now while working on paperwork to set unit(s) Ocrevus infusions. Will require a 6 week wash-out period between therapies  - Will send patient an information packet on Ocrevus   - Will obtain a Hepatitis panel  -  Plan to get COVID booster prior to starting first Ocrevus infusion  - Plan to follow-up in 4 months (~1 month post first Ocrevus infusion)          Since last visit she underwent the first 2 dosis of Ocrevus in September and October, she feels well and has no issues but reports to have another 2 infusions scheduled in April in May so we will clarify that it should be one with the full dose of Ocrevus. She had COVID last week, fever 4 days, severe sore throat but recovered well. She was  The one with the worse symptoms but recovered after 7 days.    Currently she feels well and continues to have urgency, unchanged, no incontinence. Numbness and tingling are stable, no falls, some fatigue.      Current Outpatient Prescriptions:  Current Outpatient Medications   Medication     baclofen (LIORESAL) 10 MG tablet     Biotin 2500 MCG CAPS     multivitamin w/minerals (MULTI-VITAMIN) tablet     vitamin B-12 (CYANOCOBALAMIN) 2500 MCG sublingual tablet     vitamin C (ASCORBIC ACID) 1000 MG TABS     vitamin D3 (CHOLECALCIFEROL) 10799 units (250 mcg) capsule     Current Facility-Administered Medications   Medication     lidocaine 1% with EPINEPHrine 1:100,000 injection 3 mL          ALLERGIES       Allergies   Allergen Reactions     Bees Anaphylaxis     Ferumoxytol Difficulty breathing     Penicillin G Rash     Sulfa Drugs Rash       ASSESSMENT:    Relapsing remitting multiple sclerosis, recently switched to Ocrevus due to seroconversion to LILIANE virus positive while on Tysabri.  Doing well no new neurological symptoms supportive of an exacerbation.  Residual neurological symptoms are stable.    PLAN:    She underwent her first and second dose of Ocrevus in the fall of last year and  Is scheduled for 2 infusions 1 in in April and 1 in May of this year.    We will clarify with the infusion center that she needs 1 infusion with the full dose of Ocrevus and not two every subsequent infusion.    She has been advised to undergo CD19  count in mid April and if evidence of repopulation of the B cells to undergo the booster vaccine against COVID-19 followed by the infusion 2 weeks later.    If her CD19 count shows less than 1% or less than 1 cells she should repeat a CD19 count in 6 weeks.      Finally I will follow the patient up in 6 month(s) as long as the patient is doing well. I instructed the patient to call or mychart my office with any concerns or questions.        My recommendations will be communicated back to the patient's physician(s) by mail.  Follow-up is expected to be with me.      Maryann Hooks MD  Chief, Multiple Sclerosis Division  Department of Neurology  [facility]      BILLING TIME DOCUMENTATION:   The total TIME spent on this patient on the date of the encounter/appointment was 25 minutes.       TOTAL TIME includes:   Time spent preparing to see the patient (reviewing records and tests)   Time spent face to face (or over the phone) with the patient   Time spent ordering tests, medications, procedures and referrals  Time spent documenting clinical information in Epic  Time discussing the case with other providers         Again, thank you for allowing me to participate in the care of your patient.      Sincerely,    Maryann Hooks MD

## 2022-01-20 NOTE — PROGRESS NOTES
Chayo is a 47 year old who is being evaluated via a billable video visit.      How would you like to obtain your AVS? [employer]  If the video visit is dropped, the invitation should be resent by: Other e-mail: [email or web address]  Will anyone else be joining your video visit? No      Video Start Time: 4:05 PM  Video-Visit Details    Type of service:  Video Visit    Video End Time:4:30 PM    Originating Location (pt. Location): Home    Distant Location (provider location):  [facility] MULTIPLE SCLEROSIS [facility]     Platform used for Video Visit: [employer]

## 2022-01-21 ENCOUNTER — TELEPHONE (OUTPATIENT)
Dept: NEUROLOGY | Facility: CLINIC | Age: 47
End: 2022-01-21
Payer: OTHER GOVERNMENT

## 2022-01-21 RX ORDER — METHYLPREDNISOLONE SODIUM SUCCINATE 125 MG/2ML
125 INJECTION, POWDER, LYOPHILIZED, FOR SOLUTION INTRAMUSCULAR; INTRAVENOUS ONCE
Status: CANCELLED | OUTPATIENT
Start: 2022-01-21

## 2022-01-21 RX ORDER — NALOXONE HYDROCHLORIDE 0.4 MG/ML
0.2 INJECTION, SOLUTION INTRAMUSCULAR; INTRAVENOUS; SUBCUTANEOUS
Status: CANCELLED | OUTPATIENT
Start: 2022-01-21

## 2022-01-21 RX ORDER — HEPARIN SODIUM,PORCINE 10 UNIT/ML
5 VIAL (ML) INTRAVENOUS
Status: CANCELLED | OUTPATIENT
Start: 2022-01-21

## 2022-01-21 RX ORDER — DIPHENHYDRAMINE HCL 25 MG
50 CAPSULE ORAL ONCE
Status: CANCELLED | OUTPATIENT
Start: 2022-01-21

## 2022-01-21 RX ORDER — MEPERIDINE HYDROCHLORIDE 25 MG/ML
25 INJECTION INTRAMUSCULAR; INTRAVENOUS; SUBCUTANEOUS EVERY 30 MIN PRN
Status: CANCELLED | OUTPATIENT
Start: 2022-01-21

## 2022-01-21 RX ORDER — ALBUTEROL SULFATE 90 UG/1
1-2 AEROSOL, METERED RESPIRATORY (INHALATION)
Status: CANCELLED
Start: 2022-01-21

## 2022-01-21 RX ORDER — ACETAMINOPHEN 325 MG/1
650 TABLET ORAL ONCE
Status: CANCELLED | OUTPATIENT
Start: 2022-01-21

## 2022-01-21 RX ORDER — EPINEPHRINE 1 MG/ML
0.3 INJECTION, SOLUTION, CONCENTRATE INTRAVENOUS EVERY 5 MIN PRN
Status: CANCELLED | OUTPATIENT
Start: 2022-01-21

## 2022-01-21 RX ORDER — HEPARIN SODIUM (PORCINE) LOCK FLUSH IV SOLN 100 UNIT/ML 100 UNIT/ML
5 SOLUTION INTRAVENOUS
Status: CANCELLED | OUTPATIENT
Start: 2022-01-21

## 2022-01-21 RX ORDER — DIPHENHYDRAMINE HYDROCHLORIDE 50 MG/ML
50 INJECTION INTRAMUSCULAR; INTRAVENOUS
Status: CANCELLED
Start: 2022-01-21

## 2022-01-21 RX ORDER — ALBUTEROL SULFATE 0.83 MG/ML
2.5 SOLUTION RESPIRATORY (INHALATION)
Status: CANCELLED | OUTPATIENT
Start: 2022-01-21

## 2022-01-21 RX ORDER — METHYLPREDNISOLONE SODIUM SUCCINATE 125 MG/2ML
125 INJECTION, POWDER, LYOPHILIZED, FOR SOLUTION INTRAMUSCULAR; INTRAVENOUS
Status: CANCELLED
Start: 2022-01-21

## 2022-01-21 NOTE — TELEPHONE ENCOUNTER
Chayo will be due for Ocrevus, maintenance dose, this spring/summer, depending on B Cell repopulation. Per Dr. Hooks, Chyao has been advised to undergo CD19 count in mid April and if evidence of repopulation of the B cells to undergo the booster vaccine against COVID-19 followed by the infusion 2 weeks later.    If her CD19 count shows less than 1% or less than 1 cells she should repeat a CD19 count in 6 weeks.    Therapy plan routed to Dr. Hooks for signature.  Chayo updated via My Chart.    Chayo Shukla RN

## 2022-03-26 ENCOUNTER — HEALTH MAINTENANCE LETTER (OUTPATIENT)
Age: 47
End: 2022-03-26

## 2022-04-01 RX ORDER — METHYLPREDNISOLONE SODIUM SUCCINATE 125 MG/2ML
125 INJECTION, POWDER, LYOPHILIZED, FOR SOLUTION INTRAMUSCULAR; INTRAVENOUS
Status: CANCELLED
Start: 2022-04-01

## 2022-04-01 RX ORDER — ACETAMINOPHEN 325 MG/1
650 TABLET ORAL ONCE
Status: CANCELLED | OUTPATIENT
Start: 2022-04-01

## 2022-04-01 RX ORDER — ALBUTEROL SULFATE 90 UG/1
1-2 AEROSOL, METERED RESPIRATORY (INHALATION)
Status: CANCELLED
Start: 2022-04-01

## 2022-04-01 RX ORDER — HEPARIN SODIUM (PORCINE) LOCK FLUSH IV SOLN 100 UNIT/ML 100 UNIT/ML
5 SOLUTION INTRAVENOUS
Status: CANCELLED | OUTPATIENT
Start: 2022-04-01

## 2022-04-01 RX ORDER — EPINEPHRINE 1 MG/ML
0.3 INJECTION, SOLUTION, CONCENTRATE INTRAVENOUS EVERY 5 MIN PRN
Status: CANCELLED | OUTPATIENT
Start: 2022-04-01

## 2022-04-01 RX ORDER — NALOXONE HYDROCHLORIDE 0.4 MG/ML
0.2 INJECTION, SOLUTION INTRAMUSCULAR; INTRAVENOUS; SUBCUTANEOUS
Status: CANCELLED | OUTPATIENT
Start: 2022-04-01

## 2022-04-01 RX ORDER — METHYLPREDNISOLONE SODIUM SUCCINATE 125 MG/2ML
125 INJECTION, POWDER, LYOPHILIZED, FOR SOLUTION INTRAMUSCULAR; INTRAVENOUS ONCE
Status: CANCELLED | OUTPATIENT
Start: 2022-04-01

## 2022-04-01 RX ORDER — DIPHENHYDRAMINE HYDROCHLORIDE 50 MG/ML
50 INJECTION INTRAMUSCULAR; INTRAVENOUS
Status: CANCELLED
Start: 2022-04-01

## 2022-04-01 RX ORDER — HEPARIN SODIUM,PORCINE 10 UNIT/ML
5 VIAL (ML) INTRAVENOUS
Status: CANCELLED | OUTPATIENT
Start: 2022-04-01

## 2022-04-01 RX ORDER — ALBUTEROL SULFATE 0.83 MG/ML
2.5 SOLUTION RESPIRATORY (INHALATION)
Status: CANCELLED | OUTPATIENT
Start: 2022-04-01

## 2022-04-01 RX ORDER — DIPHENHYDRAMINE HCL 25 MG
50 CAPSULE ORAL ONCE
Status: CANCELLED | OUTPATIENT
Start: 2022-04-01

## 2022-04-01 RX ORDER — MEPERIDINE HYDROCHLORIDE 25 MG/ML
25 INJECTION INTRAMUSCULAR; INTRAVENOUS; SUBCUTANEOUS EVERY 30 MIN PRN
Status: CANCELLED | OUTPATIENT
Start: 2022-04-01

## 2022-04-01 NOTE — TELEPHONE ENCOUNTER
Chayo currently has lab scheduled for 4/14 and infusion scheduled for 4/22. This is first maintenance infusion. May need to reschedule infusion based on CD19 results, see previous documentation. Ocrevus ordered by Dr Hooks, who no longer is a provider in the [facility] clinic. Therapy plan routed to Dr Love for signature.    Minna Velasquez RN

## 2022-04-02 NOTE — TELEPHONE ENCOUNTER
Signed orders for maintenance Ocrevus infusion for this established patient of our clinic, previously followed by my former colleague, Dr. Maryann Hooks.

## 2022-04-14 ENCOUNTER — LAB (OUTPATIENT)
Dept: LAB | Facility: CLINIC | Age: 47
End: 2022-04-14
Payer: OTHER GOVERNMENT

## 2022-04-14 DIAGNOSIS — G35 MULTIPLE SCLEROSIS (H): ICD-10-CM

## 2022-04-14 LAB
ALBUMIN SERPL-MCNC: 3.8 G/DL (ref 3.4–5)
ALP SERPL-CCNC: 80 U/L (ref 40–150)
ALT SERPL W P-5'-P-CCNC: 25 U/L (ref 0–50)
ANION GAP SERPL CALCULATED.3IONS-SCNC: 7 MMOL/L (ref 3–14)
AST SERPL W P-5'-P-CCNC: 20 U/L (ref 0–45)
BASOPHILS # BLD AUTO: 0.1 10E3/UL (ref 0–0.2)
BASOPHILS NFR BLD AUTO: 1 %
BILIRUB SERPL-MCNC: 0.3 MG/DL (ref 0.2–1.3)
BUN SERPL-MCNC: 10 MG/DL (ref 7–30)
CALCIUM SERPL-MCNC: 9 MG/DL (ref 8.5–10.1)
CD19 CELLS # BLD: 0 CELLS/UL (ref 107–698)
CD19 CELLS NFR BLD: <1 % (ref 6–27)
CHLORIDE BLD-SCNC: 107 MMOL/L (ref 94–109)
CO2 SERPL-SCNC: 27 MMOL/L (ref 20–32)
CREAT SERPL-MCNC: 0.66 MG/DL (ref 0.52–1.04)
EOSINOPHIL # BLD AUTO: 0.1 10E3/UL (ref 0–0.7)
EOSINOPHIL NFR BLD AUTO: 2 %
ERYTHROCYTE [DISTWIDTH] IN BLOOD BY AUTOMATED COUNT: 27.2 % (ref 10–15)
GFR SERPL CREATININE-BSD FRML MDRD: >90 ML/MIN/1.73M2
GLUCOSE BLD-MCNC: 89 MG/DL (ref 70–99)
HCT VFR BLD AUTO: 39.5 % (ref 35–47)
HGB BLD-MCNC: 12 G/DL (ref 11.7–15.7)
IMM GRANULOCYTES # BLD: 0 10E3/UL
IMM GRANULOCYTES NFR BLD: 0 %
LYMPHOCYTES # BLD AUTO: 2.1 10E3/UL (ref 0.8–5.3)
LYMPHOCYTES NFR BLD AUTO: 40 %
MCH RBC QN AUTO: 23.3 PG (ref 26.5–33)
MCHC RBC AUTO-ENTMCNC: 30.4 G/DL (ref 31.5–36.5)
MCV RBC AUTO: 77 FL (ref 78–100)
MONOCYTES # BLD AUTO: 0.5 10E3/UL (ref 0–1.3)
MONOCYTES NFR BLD AUTO: 10 %
NEUTROPHILS # BLD AUTO: 2.4 10E3/UL (ref 1.6–8.3)
NEUTROPHILS NFR BLD AUTO: 47 %
NRBC # BLD AUTO: 0 10E3/UL
NRBC BLD AUTO-RTO: 0 /100
PLATELET # BLD AUTO: 320 10E3/UL (ref 150–450)
POTASSIUM BLD-SCNC: 3.9 MMOL/L (ref 3.4–5.3)
PROT SERPL-MCNC: 7.3 G/DL (ref 6.8–8.8)
RBC # BLD AUTO: 5.15 10E6/UL (ref 3.8–5.2)
SODIUM SERPL-SCNC: 141 MMOL/L (ref 133–144)
WBC # BLD AUTO: 5.2 10E3/UL (ref 4–11)

## 2022-04-14 PROCEDURE — 85025 COMPLETE CBC W/AUTO DIFF WBC: CPT | Performed by: PATHOLOGY

## 2022-04-14 PROCEDURE — 99000 SPECIMEN HANDLING OFFICE-LAB: CPT | Performed by: PATHOLOGY

## 2022-04-14 PROCEDURE — 36415 COLL VENOUS BLD VENIPUNCTURE: CPT | Performed by: PATHOLOGY

## 2022-04-14 PROCEDURE — 86355 B CELLS TOTAL COUNT: CPT | Mod: 90 | Performed by: PATHOLOGY

## 2022-04-14 PROCEDURE — 80053 COMPREHEN METABOLIC PANEL: CPT | Performed by: PATHOLOGY

## 2022-04-21 ENCOUNTER — MEDICAL CORRESPONDENCE (OUTPATIENT)
Dept: HEALTH INFORMATION MANAGEMENT | Facility: CLINIC | Age: 47
End: 2022-04-21
Payer: OTHER GOVERNMENT

## 2022-05-21 ENCOUNTER — HEALTH MAINTENANCE LETTER (OUTPATIENT)
Age: 47
End: 2022-05-21

## 2022-05-24 ENCOUNTER — LAB (OUTPATIENT)
Dept: LAB | Facility: CLINIC | Age: 47
End: 2022-05-24
Payer: OTHER GOVERNMENT

## 2022-05-24 DIAGNOSIS — G35 MULTIPLE SCLEROSIS (H): ICD-10-CM

## 2022-05-24 PROCEDURE — 86769 SARS-COV-2 COVID-19 ANTIBODY: CPT | Mod: 90 | Performed by: PATHOLOGY

## 2022-05-24 PROCEDURE — 99000 SPECIMEN HANDLING OFFICE-LAB: CPT | Performed by: PATHOLOGY

## 2022-05-24 PROCEDURE — 86355 B CELLS TOTAL COUNT: CPT | Mod: 90 | Performed by: PATHOLOGY

## 2022-05-24 PROCEDURE — 36415 COLL VENOUS BLD VENIPUNCTURE: CPT | Performed by: PATHOLOGY

## 2022-05-25 LAB
CD19 CELLS # BLD: 3 CELLS/UL (ref 107–698)
CD19 CELLS NFR BLD: <1 % (ref 6–27)
SARS-COV-2 AB SERPL IA-ACNC: >250 U/ML
SARS-COV-2 AB SERPL-IMP: POSITIVE

## 2022-06-03 ENCOUNTER — INFUSION THERAPY VISIT (OUTPATIENT)
Dept: INFUSION THERAPY | Facility: CLINIC | Age: 47
End: 2022-06-03
Payer: OTHER GOVERNMENT

## 2022-06-03 VITALS
WEIGHT: 229 LBS | OXYGEN SATURATION: 98 % | DIASTOLIC BLOOD PRESSURE: 74 MMHG | BODY MASS INDEX: 33.82 KG/M2 | HEART RATE: 66 BPM | SYSTOLIC BLOOD PRESSURE: 115 MMHG | RESPIRATION RATE: 16 BRPM | TEMPERATURE: 97 F

## 2022-06-03 DIAGNOSIS — G35 MULTIPLE SCLEROSIS (H): Primary | ICD-10-CM

## 2022-06-03 PROCEDURE — 96413 CHEMO IV INFUSION 1 HR: CPT | Performed by: NURSE PRACTITIONER

## 2022-06-03 PROCEDURE — 96415 CHEMO IV INFUSION ADDL HR: CPT | Performed by: NURSE PRACTITIONER

## 2022-06-03 PROCEDURE — 96375 TX/PRO/DX INJ NEW DRUG ADDON: CPT | Performed by: NURSE PRACTITIONER

## 2022-06-03 PROCEDURE — 99207 PR NO CHARGE LOS: CPT

## 2022-06-03 RX ORDER — METHYLPREDNISOLONE SODIUM SUCCINATE 125 MG/2ML
125 INJECTION, POWDER, LYOPHILIZED, FOR SOLUTION INTRAMUSCULAR; INTRAVENOUS ONCE
Status: COMPLETED | OUTPATIENT
Start: 2022-06-03 | End: 2022-06-03

## 2022-06-03 RX ORDER — ACETAMINOPHEN 325 MG/1
650 TABLET ORAL ONCE
Status: CANCELLED | OUTPATIENT
Start: 2022-11-30

## 2022-06-03 RX ORDER — DIPHENHYDRAMINE HCL 25 MG
50 CAPSULE ORAL ONCE
Status: COMPLETED | OUTPATIENT
Start: 2022-06-03 | End: 2022-06-03

## 2022-06-03 RX ORDER — MEPERIDINE HYDROCHLORIDE 25 MG/ML
25 INJECTION INTRAMUSCULAR; INTRAVENOUS; SUBCUTANEOUS EVERY 30 MIN PRN
Status: CANCELLED | OUTPATIENT
Start: 2022-11-30

## 2022-06-03 RX ORDER — HEPARIN SODIUM (PORCINE) LOCK FLUSH IV SOLN 100 UNIT/ML 100 UNIT/ML
5 SOLUTION INTRAVENOUS
Status: CANCELLED | OUTPATIENT
Start: 2022-11-30

## 2022-06-03 RX ORDER — ALBUTEROL SULFATE 90 UG/1
1-2 AEROSOL, METERED RESPIRATORY (INHALATION)
Status: CANCELLED
Start: 2022-11-30

## 2022-06-03 RX ORDER — METHYLPREDNISOLONE SODIUM SUCCINATE 125 MG/2ML
125 INJECTION, POWDER, LYOPHILIZED, FOR SOLUTION INTRAMUSCULAR; INTRAVENOUS ONCE
Status: CANCELLED | OUTPATIENT
Start: 2022-11-30

## 2022-06-03 RX ORDER — METHYLPREDNISOLONE SODIUM SUCCINATE 125 MG/2ML
125 INJECTION, POWDER, LYOPHILIZED, FOR SOLUTION INTRAMUSCULAR; INTRAVENOUS
Status: CANCELLED
Start: 2022-11-30

## 2022-06-03 RX ORDER — EPINEPHRINE 1 MG/ML
0.3 INJECTION, SOLUTION INTRAMUSCULAR; SUBCUTANEOUS EVERY 5 MIN PRN
Status: CANCELLED | OUTPATIENT
Start: 2022-11-30

## 2022-06-03 RX ORDER — ACETAMINOPHEN 325 MG/1
650 TABLET ORAL ONCE
Status: COMPLETED | OUTPATIENT
Start: 2022-06-03 | End: 2022-06-03

## 2022-06-03 RX ORDER — NALOXONE HYDROCHLORIDE 0.4 MG/ML
0.2 INJECTION, SOLUTION INTRAMUSCULAR; INTRAVENOUS; SUBCUTANEOUS
Status: CANCELLED | OUTPATIENT
Start: 2022-11-30

## 2022-06-03 RX ORDER — DIPHENHYDRAMINE HCL 25 MG
50 CAPSULE ORAL ONCE
Status: CANCELLED | OUTPATIENT
Start: 2022-11-30

## 2022-06-03 RX ORDER — ALBUTEROL SULFATE 0.83 MG/ML
2.5 SOLUTION RESPIRATORY (INHALATION)
Status: CANCELLED | OUTPATIENT
Start: 2022-11-30

## 2022-06-03 RX ORDER — DIPHENHYDRAMINE HYDROCHLORIDE 50 MG/ML
50 INJECTION INTRAMUSCULAR; INTRAVENOUS
Status: CANCELLED
Start: 2022-11-30

## 2022-06-03 RX ORDER — HEPARIN SODIUM,PORCINE 10 UNIT/ML
5 VIAL (ML) INTRAVENOUS
Status: CANCELLED | OUTPATIENT
Start: 2022-11-30

## 2022-06-03 RX ADMIN — Medication 50 MG: at 08:29

## 2022-06-03 RX ADMIN — Medication 250 ML: at 08:40

## 2022-06-03 RX ADMIN — ACETAMINOPHEN 650 MG: 325 TABLET ORAL at 08:28

## 2022-06-03 RX ADMIN — METHYLPREDNISOLONE SODIUM SUCCINATE 125 MG: 125 INJECTION INTRAMUSCULAR; INTRAVENOUS at 08:40

## 2022-06-03 NOTE — PROGRESS NOTES
Infusion Nursing Note:  Chayo Nathan presents today for Ocrevus.    Patient seen by provider today: No   present during visit today: Not Applicable.    Note: Patient reports feeling well overall today. No new medical concerns reported. States she has tolerated Ocrevus infusions well in the past.    Patient refused rapid rate today as it was first day to receive full dose at one time.       Intravenous Access:  Peripheral IV placed.    Treatment Conditions:  Biological Infusion Checklist:  ~~~ NOTE: If the patient answers yes to any of the questions below, hold the infusion and contact ordering provider or on-call provider.    1. Have you recently had an elevated temperature, fever, chills, productive cough, coughing for 3 weeks or longer or hemoptysis, abnormal vital signs, night sweats,  chest pain or have you noticed a decrease in your appetite, unexplained weight loss or fatigue? No  2. Do you have any open wounds or new incisions? No  3. Do you have any recent or upcoming hospitalizations, surgeries or dental procedures? No  4. Do you currently have or recently have had any signs of illness or infection or are you on any antibiotics? No  5. Have you had any new, sudden or worsening abdominal pain? No  6. Have you or anyone in your household received a live vaccination in the past 4 weeks? Please note:  No live vaccines while on biologic/chemotherapy until 6 months after the last treatment.  Patient can receive the flu vaccine (shot only) and the pneumovax.  It is optimal for the patient to get these vaccines mid cycle, but they can be given at any time as long as it is not on the day of the infusion. No  7. Have you recently been diagnosed with any new nervous system diseases (ie. Multiple sclerosis, Guillain [place], seizures, neurological changes) or cancer diagnosis? No  8. Are you on any form of radiation or chemotherapy? No  9. Are you pregnant or breast feeding or do you have plans of pregnancy  in the future? No  10. Have there been any other new onset medical symptoms? No        Post Infusion Assessment:  Patient tolerated infusion without incident.  Patient observed for 60 minutes post infusion.  Site patent and intact, free from redness, edema or discomfort.  No evidence of extravasations.  Access discontinued per protocol.       Discharge Plan:   AVS to patient via MYCHART.  Patient will return in 6 months for next appointment.   Patient discharged in stable condition accompanied by: self.  Departure Mode: Ambulatory.      Narda Mariee RN

## 2022-07-17 NOTE — PROGRESS NOTES
[facility] Dermatology Note  Encounter Date: Jul 19, 2022  Office Visit     Dermatology Problem List:  1. BCC, right medial shin s/p shave biopsy 7/6/21   2. AK, right ear, forehead s/p cryo 7/6/21  3. Nevus spilus, left foot  4. Family history of melanoma (sister), father with skin cancer (likely melanoma per pt)  5. Dermatofibroma, R upper arm  ____________________________________________    Assessment & Plan:    # History of nonmelanoma skin cancer, no clincial evidence of recurrence.  BCC 7/6/2021 with possible + margins (path report below). No re-excision was done, discussed the risks and patient elected to continue to monitor. No evidence of recurrence on today's physical exam.    # Prior actinic keratoses R ear (1), forehead (2)  - prior cryo for AKs, no noted AKs on today's exam    # Multiple benign lesions: benign nevi, solar lentigines, seborrheic keratoses, dermatofibroma, nevus spillus. Also diffuse photodamage of the skin.  Counseled on benign nature.  - ABCDEs: Counseled ABCDEs of melanoma: Asymmetry, Border (irregularity), Color (not uniform, changes in color), Diameter (greater than 6 mm which is about the size of a pencil eraser), and Evolving (any changes in preexisting moles).  - Sun protection: Counseled SPF30+ sunscreen, UPF clothing, sun avoidance, tanning bed avoidance.    Procedures Performed:   None    Follow-up: 1 year(s) in-person, or earlier for new or changing lesions    Staff and Scribe:     Scribe Disclosure:  I, Batsheva Mcelan, am serving as a scribe to prep the notes for Pablo Jenkins MD .      Wilner Hooks, MS3  [employer]      Staff Physician:  I was present with the medical student who participated in the service and in the documentation of the note. I have verified the history and personally performed the physical exam and medical decision making. I agree with the assessment and plan of care as documented in the note.      Pablo Jenkins MD  Staff Dermatologist and Dermatopathologist  , Department of Dermatology   ____________________________________________    CC: No chief complaint on file.    HPI:  Ms. Chayo Nathan is a(n) 47 year old female who presents today as a return patient for FBSE in the context of history of NMSC and family hx of melanoma. Last seen by myself on 7/6/21 at which point 3 AKs were treated with cryo and a lesion biopsied on the R medial shin returned as a BCC.     She reports 2 spots that she would like to have looked at today, 1 on the right arm and 1 on the left lower leg. She reports that the lesions have become raised but denies pain, pruritis and bleeding. She denies any other new or changing lesions. She reports okay sunscreen use. She has a notable history of blistering sunburns in childhood and a sister with melanoma in her 20s as well as tanning bed use in teens for 6 years. She denies tobacco use.    Patient is otherwise feeling well, without additional skin concerns.    Social Hx  Nurse at [employer]    Family Hx  Sister with melanoma in sister in her 20s  Father with skin cancer - very recent sounds like melanoma  No other known family history of skin cancer      Labs Reviewed:  Dermatopathology report from 7/6/2021    Specimen #: [id number]   Collected: 7/6/2021     SPECIMEN(S):   Skin, right medial leg, shave     FINAL DIAGNOSIS:   Skin, right medial leg, shave:   - Basal cell carcinoma, superficial type - (see comment and description)    MICROSCOPIC:   The specimen exhibits a tumor of atypical basaloid epithelium arranged as   buds off the epidermal undersurface   with fibrotic stroma and clefting artifact. The lesion appears narrowly   excised in the planes examined, and   lesional stroma approximates one lateral margin.    Physical Exam:  Vitals: There were no vitals taken for this visit.  SKIN: Full skin, which includes the head/face, both arms, chest,  back, abdomen,both legs, genitalia and/or groin buttocks, digits and/or nails, was examined.  - 1-2mm firm pink papule on the R upper arm   - 3 mm L forearm pink shiny papule.  - 4.5 mm brown macular with bipolar pigmentation  - Multiple regular brown pigmented macules and papules are identified on the trunk, extremities and the face.   - Scattered brown macules on sun exposed areas.  - There are waxy stuck on tan to brown papules on the trunk and extremities.   - R medial shin, there is no erythema, telangectasias, nodularity, or pigmentation.   - left foot with large hyperpigmented patch~4-5cm with scattered hypepigmented papules overlying   - No other lesions of concern on areas examined.     Medications:  Current Outpatient Medications   Medication     baclofen (LIORESAL) 10 MG tablet     Biotin 2500 MCG CAPS     multivitamin w/minerals (THERA-VIT-M) tablet     vitamin B-12 (CYANOCOBALAMIN) 2500 MCG sublingual tablet     vitamin C (ASCORBIC ACID) 1000 MG TABS     vitamin D3 (CHOLECALCIFEROL) 41489 units (250 mcg) capsule     Current Facility-Administered Medications   Medication     lidocaine 1% with EPINEPHrine 1:100,000 injection 3 mL      Past Medical History:   Patient Active Problem List   Diagnosis     Multiple sclerosis (H)     No past medical history on file.     CC [facility]  [address] [place] [address] on close of this encounter.

## 2022-07-19 ENCOUNTER — OFFICE VISIT (OUTPATIENT)
Dept: DERMATOLOGY | Facility: CLINIC | Age: 47
End: 2022-07-19
Payer: OTHER GOVERNMENT

## 2022-07-19 DIAGNOSIS — L57.8 DIFFUSE PHOTODAMAGE OF SKIN: Primary | ICD-10-CM

## 2022-07-19 DIAGNOSIS — D23.9 DERMATOFIBROMA: ICD-10-CM

## 2022-07-19 DIAGNOSIS — L81.4 SOLAR LENTIGO: ICD-10-CM

## 2022-07-19 DIAGNOSIS — D22.9 NEVUS SPILUS: ICD-10-CM

## 2022-07-19 DIAGNOSIS — L82.1 SEBORRHEIC KERATOSES: ICD-10-CM

## 2022-07-19 DIAGNOSIS — Z80.8 FAMILY HX OF MELANOMA: ICD-10-CM

## 2022-07-19 DIAGNOSIS — Z85.828 HISTORY OF NONMELANOMA SKIN CANCER: ICD-10-CM

## 2022-07-19 PROCEDURE — 99213 OFFICE O/P EST LOW 20 MIN: CPT | Performed by: DERMATOLOGY

## 2022-07-19 ASSESSMENT — PAIN SCALES - GENERAL: PAINLEVEL: NO PAIN (0)

## 2022-07-19 NOTE — LETTER
7/19/2022       RE: Chayo Nathan  [address] [facility] [address]     Dear Colleague,    Thank you for referring your patient, Chayo Nathan, to the [facility] [place] at [facility]. Please see a copy of my visit note below.    [facility] Dermatology Note  Encounter Date: Jul 19, 2022  Office Visit     Dermatology Problem List:  1. BCC, right medial shin s/p shave biopsy 7/6/21   2. AK, right ear, forehead s/p cryo 7/6/21  3. Nevus spilus, left foot  4. Family history of melanoma (sister), father with skin cancer (likely melanoma per pt)  5. Dermatofibroma, R upper arm  ____________________________________________    Assessment & Plan:    # History of nonmelanoma skin cancer, no clincial evidence of recurrence.  BCC 7/6/2021 with possible + margins (path report below). No re-excision was done, discussed the risks and patient elected to continue to monitor. No evidence of recurrence on today's physical exam.    # Prior actinic keratoses R ear (1), forehead (2)  - prior cryo for AKs, no noted AKs on today's exam    # Multiple benign lesions: benign nevi, solar lentigines, seborrheic keratoses, dermatofibroma, nevus spillus. Also diffuse photodamage of the skin.  Counseled on benign nature.  - ABCDEs: Counseled ABCDEs of melanoma: Asymmetry, Border (irregularity), Color (not uniform, changes in color), Diameter (greater than 6 mm which is about the size of a pencil eraser), and Evolving (any changes in preexisting moles).  - Sun protection: Counseled SPF30+ sunscreen, UPF clothing, sun avoidance, tanning bed avoidance.    Procedures Performed:   None    Follow-up: 1 year(s) in-person, or earlier for new or changing lesions    Staff and Scribe:     Scribe Disclosure:  Batsheva VALDOVINOS, am serving as a scribe to prep the notes for Pablo Jenkins MD .      Wilner Hooks, MS3  [employer] [facility]  [employer]      Staff Physician:  I was present with the medical student who participated in the service and in the documentation of the note. I have verified the history and personally performed the physical exam and medical decision making. I agree with the assessment and plan of care as documented in the note.     Pablo Jenkins MD  Staff Dermatologist and Dermatopathologist  , Department of Dermatology   ____________________________________________    CC: No chief complaint on file.    HPI:  Ms. Chayo Nathan is a(n) 47 year old female who presents today as a return patient for FBSE in the context of history of NMSC and family hx of melanoma. Last seen by myself on 7/6/21 at which point 3 AKs were treated with cryo and a lesion biopsied on the R medial shin returned as a BCC.     She reports 2 spots that she would like to have looked at today, 1 on the right arm and 1 on the left lower leg. She reports that the lesions have become raised but denies pain, pruritis and bleeding. She denies any other new or changing lesions. She reports okay sunscreen use. She has a notable history of blistering sunburns in childhood and a sister with melanoma in her 20s as well as tanning bed use in teens for 6 years. She denies tobacco use.    Patient is otherwise feeling well, without additional skin concerns.    Social Hx  Nurse at [employer]    Family Hx  Sister with melanoma in sister in her 20s  Father with skin cancer - very recent sounds like melanoma  No other known family history of skin cancer      Labs Reviewed:  Dermatopathology report from 7/6/2021    Specimen #: [id number]   Collected: 7/6/2021     SPECIMEN(S):   Skin, right medial leg, shave     FINAL DIAGNOSIS:   Skin, right medial leg, shave:   - Basal cell carcinoma, superficial type - (see comment and description)    MICROSCOPIC:   The specimen exhibits a tumor of atypical basaloid epithelium arranged as   buds off the  epidermal undersurface   with fibrotic stroma and clefting artifact. The lesion appears narrowly   excised in the planes examined, and   lesional stroma approximates one lateral margin.    Physical Exam:  Vitals: There were no vitals taken for this visit.  SKIN: Full skin, which includes the head/face, both arms, chest, back, abdomen,both legs, genitalia and/or groin buttocks, digits and/or nails, was examined.  - 1-2mm firm pink papule on the R upper arm   - 3 mm L forearm pink shiny papule.  - 4.5 mm brown macular with bipolar pigmentation  - Multiple regular brown pigmented macules and papules are identified on the trunk, extremities and the face.   - Scattered brown macules on sun exposed areas.  - There are waxy stuck on tan to brown papules on the trunk and extremities.   - R medial shin, there is no erythema, telangectasias, nodularity, or pigmentation.   - left foot with large hyperpigmented patch~4-5cm with scattered hypepigmented papules overlying   - No other lesions of concern on areas examined.     Medications:  Current Outpatient Medications   Medication     baclofen (LIORESAL) 10 MG tablet     Biotin 2500 MCG CAPS     multivitamin w/minerals (THERA-VIT-M) tablet     vitamin B-12 (CYANOCOBALAMIN) 2500 MCG sublingual tablet     vitamin C (ASCORBIC ACID) 1000 MG TABS     vitamin D3 (CHOLECALCIFEROL) 34481 units (250 mcg) capsule     Current Facility-Administered Medications   Medication     lidocaine 1% with EPINEPHrine 1:100,000 injection 3 mL      Past Medical History:   Patient Active Problem List   Diagnosis     Multiple sclerosis (H)     No past medical history on file.     CC [facility]  [address] [place] [address] on close of this encounter.

## 2022-07-19 NOTE — NURSING NOTE
Dermatology Rooming Note    Chayo Nathan's goals for this visit include:   Chief Complaint   Patient presents with     Skin Check     Chayo is here for an annual skin check.  There was a spot of concern on her right calf that was being monitored.  Has other spots on her left lower leg and right upper arm.       Cheryl Bassett, CMA

## 2022-08-17 NOTE — TELEPHONE ENCOUNTER
RECORDS RECEIVED FROM: Internal   REASON FOR VISIT: MS   Date of Appt: 10/17/2022   NOTES (FOR ALL VISITS) STATUS DETAILS   OFFICE NOTE from referring provider N/A    OFFICE NOTE from other specialist Internal 01/20/2022 Dr Hooks [facility]    DISCHARGE SUMMARY from hospital N/A    DISCHARGE REPORT from the ER N/A    OPERATIVE REPORT N/A    MEDICATION LIST N/A    IMAGING  (FOR ALL VISITS)     EMG N/A    EEG N/A    LUMBAR PUNCTURE N/A    HOLDEN SCAN N/A    ULTRASOUND (CAROTID BILAT) *VASCULAR* N/A    MRI (HEAD, NECK, SPINE) Internal 10/07/2022 brain, cervical spine  08/11/2021 brain, cervical thoraicic spine   CT (HEAD, NECK, SPINE) N/A

## 2022-09-18 ENCOUNTER — HEALTH MAINTENANCE LETTER (OUTPATIENT)
Age: 47
End: 2022-09-18

## 2022-10-07 ENCOUNTER — ANCILLARY PROCEDURE (OUTPATIENT)
Dept: MRI IMAGING | Facility: CLINIC | Age: 47
End: 2022-10-07
Attending: PSYCHIATRY & NEUROLOGY
Payer: OTHER GOVERNMENT

## 2022-10-07 DIAGNOSIS — G35 MULTIPLE SCLEROSIS (H): ICD-10-CM

## 2022-10-07 PROCEDURE — A9585 GADOBUTROL INJECTION: HCPCS | Performed by: RADIOLOGY

## 2022-10-07 PROCEDURE — 72156 MRI NECK SPINE W/O & W/DYE: CPT | Performed by: RADIOLOGY

## 2022-10-07 PROCEDURE — 70553 MRI BRAIN STEM W/O & W/DYE: CPT | Mod: GC | Performed by: RADIOLOGY

## 2022-10-07 RX ORDER — GADOBUTROL 604.72 MG/ML
10 INJECTION INTRAVENOUS ONCE
Status: COMPLETED | OUTPATIENT
Start: 2022-10-07 | End: 2022-10-07

## 2022-10-07 RX ADMIN — GADOBUTROL 10 ML: 604.72 INJECTION INTRAVENOUS at 08:26

## 2022-10-09 NOTE — PROGRESS NOTES
Infusion Nursing Note:  Chayo Nathan presents today for Tysabri.    Patient seen by provider today: No   present during visit today: Not Applicable.    Note: Chayo is feeling ready for infusion.  Her symptoms increase prior to the infusion due time.  She states this could be her last Tysabri.  She has increased JCV Virus,  her team is working on getting Ocrevus approved.      Intravenous Access:  Peripheral IV placed.    Treatment Conditions:  Tysabri pre-infusion checklist completed via touch program.      Post Infusion Assessment:  Patient tolerated infusion without incident.   Refused observation time    Discharge Plan:   Patient discharged in stable condition accompanied by: self.  Next appt 9/20/2021    Vianey Foley RN                  
complains of pain/discomfort

## 2022-10-17 ENCOUNTER — PRE VISIT (OUTPATIENT)
Dept: NEUROLOGY | Facility: CLINIC | Age: 47
End: 2022-10-17

## 2022-10-17 ENCOUNTER — OFFICE VISIT (OUTPATIENT)
Dept: NEUROLOGY | Facility: CLINIC | Age: 47
End: 2022-10-17
Payer: OTHER GOVERNMENT

## 2022-10-17 VITALS
WEIGHT: 239 LBS | BODY MASS INDEX: 35.29 KG/M2 | SYSTOLIC BLOOD PRESSURE: 113 MMHG | DIASTOLIC BLOOD PRESSURE: 72 MMHG | HEART RATE: 79 BPM

## 2022-10-17 DIAGNOSIS — G35 MS (MULTIPLE SCLEROSIS) (H): Primary | ICD-10-CM

## 2022-10-17 DIAGNOSIS — E55.9 VITAMIN D DEFICIENCY: ICD-10-CM

## 2022-10-17 PROCEDURE — 99214 OFFICE O/P EST MOD 30 MIN: CPT | Mod: GC | Performed by: PSYCHIATRY & NEUROLOGY

## 2022-10-17 NOTE — LETTER
10/17/2022      RE: Chayo Nathan  [address] [facility] [address]     Multiple Sclerosis Clinic Visit  10/17/2022    Reason: Multiple Sclerosis     Source of information: Patient and chart review    History of Present Symptom:  Chayo Nathan is a 47 year old female with a PMH significant for Tamika-en-Y gastric bypass and relapsing remitting multiple sclerosis. She presents today to establish care; she previously followed with Dr. Hooks who has since left the practice.     Disease onset: 2011/2012 paresthesias in the legs painful burning sensation when going in water. Was told that it was a pinched nerve and no imaging was done. She also had a likely optic neuritis even which was attributed to an eye infection.   Diagnosed in 2018 after she developed left foot drop developed one day at work.   Most recent relapse: 2019 had falls and gait instability which lasted a few weeks.    Previous disease modifying therapy:   Copaxone - started initially and continued for about one year  Tysabri - started 2018 in order to be on a higher efficacy therapy, stopped after JCV positive Fall of 2021.   Ocrevus - started October 2021 , last done June 3rd 2022     Ocrevus has worked very well, she denies any side effects. She feels her loss of sensation improved and she has had less falls since starting Ocrevus.     She had COVID-19 in January. Has not gotten Evusheld but does have the info to get it set up. Sore throat high fever 102-103 for 5-6 days. Very fatigued. Considered going to the ER because she felt so ill. She was sick for about 2 weeks.     She reports persitent numbness in the feet. This impacts her balance when going down stairs. Very rarely she trips.     Symptom management:  Fatigue: No concerns.   Bowel/bladder changes: Urgency with bowel and bladder. Has had stool incontinence. No urinary incontinence. No constipation. Has had four kids and this has contributed to urinary symptoms.   Gait/balance: In  significant heat she may limit her distances because of fatigue. 2-3 miles of walking she will slow down and her legs feel heavy and left foot start to drag.     -Baclofen helpful in the past for spasms in the legs, this is no longer needed.     Vitamin D 10,000 about 5 days per week.     The patient's medical, surgical, social, and family history were personally reviewed with the patient.    Social History     Tobacco Use     Smoking status: Never     Smokeless tobacco: Never     Family History   Problem Relation Age of Onset     Melanoma Father      Melanoma Sister      Current Outpatient Medications   Medication     Biotin 2500 MCG CAPS     multivitamin w/minerals (THERA-VIT-M) tablet     ocrelizumab (OCREVUS) 300 MG/10ML SOLN injection     vitamin B-12 (CYANOCOBALAMIN) 2500 MCG sublingual tablet     vitamin C (ASCORBIC ACID) 1000 MG TABS     vitamin D3 (CHOLECALCIFEROL) 22039 units (250 mcg) capsule     baclofen (LIORESAL) 10 MG tablet     Current Facility-Administered Medications   Medication     lidocaine 1% with EPINEPHrine 1:100,000 injection 3 mL     Allergies   Allergen Reactions     Bees Anaphylaxis     Ferumoxytol Difficulty breathing     Penicillin G Rash     Sulfa Drugs Rash         Review of Systems:  14-point review of systems was completed. The pertinent positives and negatives are in the HPI.    Physical Examination   Vitals: /72 (BP Location: Right arm, Patient Position: Sitting, Cuff Size: Adult Large)   Pulse 79   Wt 108.4 kg (239 lb)   BMI 35.29 kg/m    General: Patient appears comfortable in no acute distress.   HEENT: NC/AT, no icterus, moist mucous membranes  Chest: non-labored on RA  Extremities: Warm, no edema  Skin: No rash or lesion   Psych: Affect appropriate for situation   Neuro:  Mental status: Awake, alert, attentive. Language is fluent with intact comprehension of commands.  Cranial nerves: Conjugate gaze, EOMI, visual fields intact, face symmetric, shoulder shrug strong,  tongue protrusion/uvula midline, no dysarthria.   Motor: Normal muscle bulk and tone. No abnormal movements. 5/5 strength in 4/4 extremities.     R L  Deltoid  5 5  Biceps  5 5  Triceps  5 5  Wrist ext 5 5  Finger ext 5 5  Finger abd 5 5    Hip flexion 5 5  Knee flexion 5 5  Knee ext 5 5  Dorsiflexion 5 5    Reflexes: 2+ reflexes symmetric in biceps, brachioradialis, patellae, and achilles. No clonus, toes down-going.  Sensory: Intact to light touch. Romberg is negative.   Coordination: FNF without ataxia or dysmetria.    Gait: Normal width, stride length, turn, with symmetric arm swing. Tandem walk with minor difficulty. Able to balance on each leg, able to do about one jump on each side.     Laboratory:  Component Ref Range & Units      CD19% B Cells 6 - 27 % <1 Low      Absolute CD19, B Cells 107 - 698 cells/uL 3 Low           Imaging:    MRI brain  Impression:   1. The study demonstrates 5-10 foci of T2-hyperintensity within the  cerebral white matter consistent with the clinical suspicion of  demyelinating disease. There are no foci of abnormal enhancement noted  intracranially .   2. There is no significant interval change from the prior study.    MRI c-spine   Impression:   1. No abnormal enhancement in the spinal cord, thecal sac or cervical  vertebrae.  2. No significant change in multiple T2 hyperintense foci scattered  throughout the cervical spinal cord as detailed above. Findings likely  represent sequelae of demyelinating disease. No definite interval  Demyelination.    Assessment/Plan:  Chayo Nathan is a 47 year old female who presents for relapsing remitting multiple sclerosis. She has done well since starting Ocrevus last year after she became JCV positive on Tysabri. She did have a significant COVID infection in January and we discussed risks of more severe infections with covid while on Ocrevus. We also discussed booster recommendations 2 weeks prior to next infusion and no less than 3 months  after an infusion. We discussed Evusheld which can be done at any time.     -continue ocrevus next infusion target date 12/3/2022  -blood work prior to infusion: CBC w/dif, hepatic panel, CD19 panel, vit D   -follow up in 6 months     Patient seen and discussed with Dr. Love.  I have reviewed the plan with the patient, who is in agreement.      Chayo Koenig DO  Multiple Sclerosis Fellow     Attending physician: I saw and evaluated the patient with Dr. Koenig, and I agree with her findings and plan of care as documented above.     She is due for next ocrelizumab infusion in early December; prior to the treatment we will check routine laboratory studies for monitoring of this medication to include CBC with differential, hepatic panel, and CD19 count.    I will also check a vitamin D level and advise her on supplementation with vitamin D as needed to maintain her level within our goal range of 60-80 mcg/L.    Diagnoses:    1) Multiple sclerosis  2) Vitamin D deficiency    Luiz Love MD   of Neurology  [facility]    Cc:  Patient

## 2022-10-17 NOTE — PROGRESS NOTES
Multiple Sclerosis Clinic Visit  10/17/2022    Reason: Multiple Sclerosis     Source of information: Patient and chart review    History of Present Symptom:  Chayo Nathan is a 47 year old female with a PMH significant for Tamika-en-Y gastric bypass and relapsing remitting multiple sclerosis. She presents today to establish care; she previously followed with Dr. Hooks who has since left the practice.     Disease onset: 2011/2012 paresthesias in the legs painful burning sensation when going in water. Was told that it was a pinched nerve and no imaging was done. She also had a likely optic neuritis even which was attributed to an eye infection.   Diagnosed in 2018 after she developed left foot drop developed one day at work.   Most recent relapse: 2019 had falls and gait instability which lasted a few weeks.    Previous disease modifying therapy:   Copaxone - started initially and continued for about one year  Tysabri - started 2018 in order to be on a higher efficacy therapy, stopped after JCV positive Fall of 2021.   Ocrevus - started October 2021 , last done June 3rd 2022     Ocrevus has worked very well, she denies any side effects. She feels her loss of sensation improved and she has had less falls since starting Ocrevus.     She had COVID-19 in January. Has not gotten Evusheld but does have the info to get it set up. Sore throat high fever 102-103 for 5-6 days. Very fatigued. Considered going to the ER because she felt so ill. She was sick for about 2 weeks.     She reports persitent numbness in the feet. This impacts her balance when going down stairs. Very rarely she trips.     Symptom management:  Fatigue: No concerns.   Bowel/bladder changes: Urgency with bowel and bladder. Has had stool incontinence. No urinary incontinence. No constipation. Has had four kids and this has contributed to urinary symptoms.   Gait/balance: In significant heat she may limit her distances because of fatigue. 2-3 miles of walking  she will slow down and her legs feel heavy and left foot start to drag.     -Baclofen helpful in the past for spasms in the legs, this is no longer needed.     Vitamin D 10,000 about 5 days per week.     The patient's medical, surgical, social, and family history were personally reviewed with the patient.    Social History     Tobacco Use     Smoking status: Never     Smokeless tobacco: Never     Family History   Problem Relation Age of Onset     Melanoma Father      Melanoma Sister      Current Outpatient Medications   Medication     Biotin 2500 MCG CAPS     multivitamin w/minerals (THERA-VIT-M) tablet     ocrelizumab (OCREVUS) 300 MG/10ML SOLN injection     vitamin B-12 (CYANOCOBALAMIN) 2500 MCG sublingual tablet     vitamin C (ASCORBIC ACID) 1000 MG TABS     vitamin D3 (CHOLECALCIFEROL) 60637 units (250 mcg) capsule     baclofen (LIORESAL) 10 MG tablet     Current Facility-Administered Medications   Medication     lidocaine 1% with EPINEPHrine 1:100,000 injection 3 mL     Allergies   Allergen Reactions     Bees Anaphylaxis     Ferumoxytol Difficulty breathing     Penicillin G Rash     Sulfa Drugs Rash         Review of Systems:  14-point review of systems was completed. The pertinent positives and negatives are in the HPI.    Physical Examination   Vitals: /72 (BP Location: Right arm, Patient Position: Sitting, Cuff Size: Adult Large)   Pulse 79   Wt 108.4 kg (239 lb)   BMI 35.29 kg/m    General: Patient appears comfortable in no acute distress.   HEENT: NC/AT, no icterus, moist mucous membranes  Chest: non-labored on RA  Extremities: Warm, no edema  Skin: No rash or lesion   Psych: Affect appropriate for situation   Neuro:  Mental status: Awake, alert, attentive. Language is fluent with intact comprehension of commands.  Cranial nerves: Conjugate gaze, EOMI, visual fields intact, face symmetric, shoulder shrug strong, tongue protrusion/uvula midline, no dysarthria.   Motor: Normal muscle bulk and tone.  No abnormal movements. 5/5 strength in 4/4 extremities.     R L  Deltoid  5 5  Biceps  5 5  Triceps  5 5  Wrist ext 5 5  Finger ext 5 5  Finger abd 5 5    Hip flexion 5 5  Knee flexion 5 5  Knee ext 5 5  Dorsiflexion 5 5    Reflexes: 2+ reflexes symmetric in biceps, brachioradialis, patellae, and achilles. No clonus, toes down-going.  Sensory: Intact to light touch. Romberg is negative.   Coordination: FNF without ataxia or dysmetria.    Gait: Normal width, stride length, turn, with symmetric arm swing. Tandem walk with minor difficulty. Able to balance on each leg, able to do about one jump on each side.     Laboratory:  Component Ref Range & Units      CD19% B Cells 6 - 27 % <1 Low      Absolute CD19, B Cells 107 - 698 cells/uL 3 Low           Imaging:    MRI brain  Impression:   1. The study demonstrates 5-10 foci of T2-hyperintensity within the  cerebral white matter consistent with the clinical suspicion of  demyelinating disease. There are no foci of abnormal enhancement noted  intracranially .   2. There is no significant interval change from the prior study.    MRI c-spine   Impression:   1. No abnormal enhancement in the spinal cord, thecal sac or cervical  vertebrae.  2. No significant change in multiple T2 hyperintense foci scattered  throughout the cervical spinal cord as detailed above. Findings likely  represent sequelae of demyelinating disease. No definite interval  Demyelination.    Assessment/Plan:  Chayo Nathan is a 47 year old female who presents for relapsing remitting multiple sclerosis. She has done well since starting Ocrevus last year after she became JCV positive on Tysabri. She did have a significant COVID infection in January and we discussed risks of more severe infections with covid while on Ocrevus. We also discussed booster recommendations 2 weeks prior to next infusion and no less than 3 months after an infusion. We discussed Evusheld which can be done at any time.     -continue  ocrevus next infusion target date 12/3/2022  -blood work prior to infusion: CBC w/dif, hepatic panel, CD19 panel, vit D   -follow up in 6 months     Patient seen and discussed with Dr. Love.  I have reviewed the plan with the patient, who is in agreement.      Chayo Koenig DO  Multiple Sclerosis Fellow     Attending physician: I saw and evaluated the patient with Dr. Koenig, and I agree with her findings and plan of care as documented above.     She is due for next ocrelizumab infusion in early December; prior to the treatment we will check routine laboratory studies for monitoring of this medication to include CBC with differential, hepatic panel, and CD19 count.    I will also check a vitamin D level and advise her on supplementation with vitamin D as needed to maintain her level within our goal range of 60-80 mcg/L.    Diagnoses:    1) Multiple sclerosis  2) Vitamin D deficiency    Luiz Love MD   of Neurology  [facility] [place]

## 2022-10-17 NOTE — PATIENT INSTRUCTIONS
Proceed with next Ocrevus infusion in December (target date 12-3-2022)    2.   Blood tests on day of infusion PRIOR to treatment    3.   Return to clinic in 6 months

## 2022-10-17 NOTE — Clinical Note
10/17/2022         RE: Chayo Nathan  [id number] [facility] [address]        Dear Colleague,    Thank you for referring your patient, Chayo Nathan, to the [facility] [place]. Please see a copy of my visit note below.    Multiple Sclerosis Clinic Visit  10/17/2022    Reason: Multiple Sclerosis     Source of information: Patient and chart review    History of Present Symptom:  Chayo Nathan is a 47 year old female with a PMH significant for Tamika-en-Y gastric bypass and relapsing remitting multiple sclerosis. She presents today to establish care; she previously followed with Dr. Hooks who has since left the practice.     Disease onset: 2011/2012 paresthesias in the legs painful burning sensation when going in water. Was told that it was a pinched nerve and no imaging was done. She also had a likely optic neuritis even which was attributed to an eye infection.   Diagnosed in 2018 after she developed left foot drop developed one day at work.   Most recent relapse: 2019 had falls and gait instability which lasted a few weeks.    Previous disease modifying therapy:   Copaxone - started initially and continued for about one year  Tysabri - started 2018 in order to be on a higher efficacy therapy, stopped after JCV positive Fall of 2021.   Ocrevus - started October 2021 , last done June 3rd 2022     Ocrevus has worked very well, she denies any side effects. She feels her loss of sensation improved and she has had less falls since starting Ocrevus.     She had COVID-19 in January. Has not gotten Evusheld but does have the info to get it set up. Sore throat high fever 102-103 for 5-6 days. Very fatigued. Considered going to the ER because she felt so ill. She was sick for about 2 weeks.     She reports persitent numbness in the feet. This impacts her balance when going down stairs. Very rarely she trips.     Symptom management:  Fatigue: No concerns.   Bowel/bladder changes:  Urgency with bowel and bladder. Has had stool incontinence. No urinary incontinence. No constipation. Has had four kids and this has contributed to urinary symptoms.   Gait/balance: In significant heat she may limit her distances because of fatigue. 2-3 miles of walking she will slow down and her legs feel heavy and left foot start to drag.     -Baclofen helpful in the past for spasms in the legs, this is no longer needed.     Vitamin D 10,000 about 5 days per week.     The patient's medical, surgical, social, and family history were personally reviewed with the patient.    Social History     Tobacco Use     Smoking status: Never     Smokeless tobacco: Never     Family History   Problem Relation Age of Onset     Melanoma Father      Melanoma Sister      Current Outpatient Medications   Medication     Biotin 2500 MCG CAPS     multivitamin w/minerals (THERA-VIT-M) tablet     ocrelizumab (OCREVUS) 300 MG/10ML SOLN injection     vitamin B-12 (CYANOCOBALAMIN) 2500 MCG sublingual tablet     vitamin C (ASCORBIC ACID) 1000 MG TABS     vitamin D3 (CHOLECALCIFEROL) 69702 units (250 mcg) capsule     baclofen (LIORESAL) 10 MG tablet     Current Facility-Administered Medications   Medication     lidocaine 1% with EPINEPHrine 1:100,000 injection 3 mL     Allergies   Allergen Reactions     Bees Anaphylaxis     Ferumoxytol Difficulty breathing     Penicillin G Rash     Sulfa Drugs Rash         Review of Systems:  14-point review of systems was completed. The pertinent positives and negatives are in the HPI.    Physical Examination   Vitals: /72 (BP Location: Right arm, Patient Position: Sitting, Cuff Size: Adult Large)   Pulse 79   Wt 108.4 kg (239 lb)   BMI 35.29 kg/m    General: Patient appears comfortable in no acute distress.   HEENT: NC/AT, no icterus, moist mucous membranes  Chest: non-labored on RA  Extremities: Warm, no edema  Skin: No rash or lesion   Psych: Affect appropriate for situation   Neuro:  Mental  status: Awake, alert, attentive. Language is fluent with intact comprehension of commands.  Cranial nerves: Conjugate gaze, EOMI, visual fields intact, face symmetric, shoulder shrug strong, tongue protrusion/uvula midline, no dysarthria.   Motor: Normal muscle bulk and tone. No abnormal movements. 5/5 strength in 4/4 extremities.     R L  Deltoid  5 5  Biceps  5 5  Triceps  5 5  Wrist ext 5 5  Finger ext 5 5  Finger abd 5 5    Hip flexion 5 5  Knee flexion 5 5  Knee ext 5 5  Dorsiflexion 5 5    Reflexes: 2+ reflexes symmetric in biceps, brachioradialis, patellae, and achilles. No clonus, toes down-going.  Sensory: Intact to light touch. Romberg is negative.   Coordination: FNF without ataxia or dysmetria.    Gait: Normal width, stride length, turn, with symmetric arm swing. Tandem walk with minor difficulty. Able to balance on each leg, able to do about one jump on each side.     Laboratory:  Component Ref Range & Units      CD19% B Cells 6 - 27 % <1 Low      Absolute CD19, B Cells 107 - 698 cells/uL 3 Low           Imaging:    MRI brain  Impression:   1. The study demonstrates 5-10 foci of T2-hyperintensity within the  cerebral white matter consistent with the clinical suspicion of  demyelinating disease. There are no foci of abnormal enhancement noted  intracranially .   2. There is no significant interval change from the prior study.    MRI c-spine   Impression:   1. No abnormal enhancement in the spinal cord, thecal sac or cervical  vertebrae.  2. No significant change in multiple T2 hyperintense foci scattered  throughout the cervical spinal cord as detailed above. Findings likely  represent sequelae of demyelinating disease. No definite interval  Demyelination.    Assessment/Plan:  Chayo Nathan is a 47 year old female who presents for relapsing remitting multiple sclerosis. She has done well since starting Ocrevus last year after she became JCV positive on Tysabri. She did have a significant COVID infection  in January and we discussed risks of more severe infections with covid while on Ocrevus. We also discussed booster recommendations 2 weeks prior to next infusion and no less than 3 months after an infusion. We discussed Evusheld which can be done at any time.     -continue ocrevus next infusion target date 12/3/2022  -blood work prior to infusion: CBC w/dif, hepatic panel, CD19 panel, vit D   -follow up in 6 months     Patient seen and discussed with Dr. Love.  I have reviewed the plan with the patient, who is in agreement.      Chayo Koenig DO  Multiple Sclerosis Fellow     Attending physician: I saw and evaluated the patient with Dr. Koenig, and I agree with her findings and plan of care as documented above.     She is due for next ocrelizumab infusion in early December; prior to the treatment we will check routine laboratory studies for monitoring of this medication to include CBC with differential, hepatic panel, and CD19 count.    I will also check a vitamin D level and advise her on supplementation with vitamin D as needed to maintain her level within our goal range of 60-80 mcg/L.    Diagnoses:    1) Multiple sclerosis  2) Vitamin D deficiency    Luiz Love MD   of Neurology  [facility]          Again, thank you for allowing me to participate in the care of your patient.        Sincerely,        Luiz Love MD

## 2022-12-16 ENCOUNTER — INFUSION THERAPY VISIT (OUTPATIENT)
Dept: INFUSION THERAPY | Facility: CLINIC | Age: 47
End: 2022-12-16
Payer: OTHER GOVERNMENT

## 2022-12-16 ENCOUNTER — LAB (OUTPATIENT)
Dept: LAB | Facility: CLINIC | Age: 47
End: 2022-12-16
Payer: OTHER GOVERNMENT

## 2022-12-16 VITALS
SYSTOLIC BLOOD PRESSURE: 152 MMHG | TEMPERATURE: 98.1 F | WEIGHT: 235 LBS | HEART RATE: 73 BPM | DIASTOLIC BLOOD PRESSURE: 81 MMHG | RESPIRATION RATE: 16 BRPM | OXYGEN SATURATION: 99 % | BODY MASS INDEX: 34.7 KG/M2

## 2022-12-16 DIAGNOSIS — E55.9 VITAMIN D DEFICIENCY: ICD-10-CM

## 2022-12-16 DIAGNOSIS — G35 MS (MULTIPLE SCLEROSIS) (H): ICD-10-CM

## 2022-12-16 DIAGNOSIS — G35 MULTIPLE SCLEROSIS (H): Primary | ICD-10-CM

## 2022-12-16 LAB
ALBUMIN SERPL-MCNC: 3.5 G/DL (ref 3.4–5)
ALP SERPL-CCNC: 68 U/L (ref 40–150)
ALT SERPL W P-5'-P-CCNC: 28 U/L (ref 0–50)
AST SERPL W P-5'-P-CCNC: 21 U/L (ref 0–45)
BASOPHILS # BLD AUTO: 0.1 10E3/UL (ref 0–0.2)
BASOPHILS NFR BLD AUTO: 1 %
BILIRUB DIRECT SERPL-MCNC: <0.1 MG/DL (ref 0–0.2)
BILIRUB SERPL-MCNC: 0.2 MG/DL (ref 0.2–1.3)
CD19 CELLS # BLD: 1 CELLS/UL (ref 107–698)
CD19 CELLS NFR BLD: <1 % (ref 6–27)
DEPRECATED CALCIDIOL+CALCIFEROL SERPL-MC: 126 UG/L (ref 20–75)
EOSINOPHIL # BLD AUTO: 0.2 10E3/UL (ref 0–0.7)
EOSINOPHIL NFR BLD AUTO: 2 %
ERYTHROCYTE [DISTWIDTH] IN BLOOD BY AUTOMATED COUNT: 15.7 % (ref 10–15)
HCT VFR BLD AUTO: 31.2 % (ref 35–47)
HGB BLD-MCNC: 9.1 G/DL (ref 11.7–15.7)
IMM GRANULOCYTES # BLD: 0 10E3/UL
IMM GRANULOCYTES NFR BLD: 0 %
LYMPHOCYTES # BLD AUTO: 1.7 10E3/UL (ref 0.8–5.3)
LYMPHOCYTES NFR BLD AUTO: 24 %
MCH RBC QN AUTO: 20.4 PG (ref 26.5–33)
MCHC RBC AUTO-ENTMCNC: 29.2 G/DL (ref 31.5–36.5)
MCV RBC AUTO: 70 FL (ref 78–100)
MONOCYTES # BLD AUTO: 0.6 10E3/UL (ref 0–1.3)
MONOCYTES NFR BLD AUTO: 9 %
NEUTROPHILS # BLD AUTO: 4.5 10E3/UL (ref 1.6–8.3)
NEUTROPHILS NFR BLD AUTO: 64 %
NRBC # BLD AUTO: 0 10E3/UL
NRBC BLD AUTO-RTO: 0 /100
PLATELET # BLD AUTO: 435 10E3/UL (ref 150–450)
PROT SERPL-MCNC: 7 G/DL (ref 6.8–8.8)
RBC # BLD AUTO: 4.47 10E6/UL (ref 3.8–5.2)
WBC # BLD AUTO: 7.1 10E3/UL (ref 4–11)

## 2022-12-16 PROCEDURE — 36415 COLL VENOUS BLD VENIPUNCTURE: CPT

## 2022-12-16 PROCEDURE — 80076 HEPATIC FUNCTION PANEL: CPT

## 2022-12-16 PROCEDURE — 99207 PR NO CHARGE LOS: CPT

## 2022-12-16 PROCEDURE — 96413 CHEMO IV INFUSION 1 HR: CPT | Performed by: NURSE PRACTITIONER

## 2022-12-16 PROCEDURE — 96415 CHEMO IV INFUSION ADDL HR: CPT | Performed by: NURSE PRACTITIONER

## 2022-12-16 PROCEDURE — 82306 VITAMIN D 25 HYDROXY: CPT

## 2022-12-16 PROCEDURE — 96375 TX/PRO/DX INJ NEW DRUG ADDON: CPT | Performed by: NURSE PRACTITIONER

## 2022-12-16 PROCEDURE — 86355 B CELLS TOTAL COUNT: CPT

## 2022-12-16 PROCEDURE — 85025 COMPLETE CBC W/AUTO DIFF WBC: CPT

## 2022-12-16 RX ORDER — MEPERIDINE HYDROCHLORIDE 25 MG/ML
25 INJECTION INTRAMUSCULAR; INTRAVENOUS; SUBCUTANEOUS EVERY 30 MIN PRN
Status: CANCELLED | OUTPATIENT
Start: 2023-05-29

## 2022-12-16 RX ORDER — NALOXONE HYDROCHLORIDE 0.4 MG/ML
0.2 INJECTION, SOLUTION INTRAMUSCULAR; INTRAVENOUS; SUBCUTANEOUS
Status: CANCELLED | OUTPATIENT
Start: 2023-05-29

## 2022-12-16 RX ORDER — EPINEPHRINE 1 MG/ML
0.3 INJECTION, SOLUTION INTRAMUSCULAR; SUBCUTANEOUS EVERY 5 MIN PRN
Status: CANCELLED | OUTPATIENT
Start: 2023-05-29

## 2022-12-16 RX ORDER — METHYLPREDNISOLONE SODIUM SUCCINATE 125 MG/2ML
125 INJECTION, POWDER, LYOPHILIZED, FOR SOLUTION INTRAMUSCULAR; INTRAVENOUS ONCE
Status: COMPLETED | OUTPATIENT
Start: 2022-12-16 | End: 2022-12-16

## 2022-12-16 RX ORDER — ACETAMINOPHEN 325 MG/1
650 TABLET ORAL ONCE
Status: COMPLETED | OUTPATIENT
Start: 2022-12-16 | End: 2022-12-16

## 2022-12-16 RX ORDER — ALBUTEROL SULFATE 0.83 MG/ML
2.5 SOLUTION RESPIRATORY (INHALATION)
Status: CANCELLED | OUTPATIENT
Start: 2023-05-29

## 2022-12-16 RX ORDER — DIPHENHYDRAMINE HYDROCHLORIDE 50 MG/ML
50 INJECTION INTRAMUSCULAR; INTRAVENOUS
Status: CANCELLED
Start: 2023-05-29

## 2022-12-16 RX ORDER — ALBUTEROL SULFATE 90 UG/1
1-2 AEROSOL, METERED RESPIRATORY (INHALATION)
Status: CANCELLED
Start: 2023-05-29

## 2022-12-16 RX ORDER — HEPARIN SODIUM (PORCINE) LOCK FLUSH IV SOLN 100 UNIT/ML 100 UNIT/ML
5 SOLUTION INTRAVENOUS
Status: CANCELLED | OUTPATIENT
Start: 2023-05-29

## 2022-12-16 RX ORDER — METHYLPREDNISOLONE SODIUM SUCCINATE 125 MG/2ML
125 INJECTION, POWDER, LYOPHILIZED, FOR SOLUTION INTRAMUSCULAR; INTRAVENOUS ONCE
Status: CANCELLED | OUTPATIENT
Start: 2023-05-29

## 2022-12-16 RX ORDER — DIPHENHYDRAMINE HCL 25 MG
50 CAPSULE ORAL ONCE
Status: COMPLETED | OUTPATIENT
Start: 2022-12-16 | End: 2022-12-16

## 2022-12-16 RX ORDER — HEPARIN SODIUM,PORCINE 10 UNIT/ML
5 VIAL (ML) INTRAVENOUS
Status: CANCELLED | OUTPATIENT
Start: 2023-05-29

## 2022-12-16 RX ORDER — DIPHENHYDRAMINE HCL 25 MG
50 CAPSULE ORAL ONCE
Status: CANCELLED | OUTPATIENT
Start: 2023-05-29

## 2022-12-16 RX ORDER — ACETAMINOPHEN 325 MG/1
650 TABLET ORAL ONCE
Status: CANCELLED | OUTPATIENT
Start: 2023-05-29

## 2022-12-16 RX ORDER — METHYLPREDNISOLONE SODIUM SUCCINATE 125 MG/2ML
125 INJECTION, POWDER, LYOPHILIZED, FOR SOLUTION INTRAMUSCULAR; INTRAVENOUS
Status: CANCELLED
Start: 2023-05-29

## 2022-12-16 RX ADMIN — METHYLPREDNISOLONE SODIUM SUCCINATE 125 MG: 125 INJECTION INTRAMUSCULAR; INTRAVENOUS at 09:01

## 2022-12-16 RX ADMIN — ACETAMINOPHEN 650 MG: 325 TABLET ORAL at 08:44

## 2022-12-16 RX ADMIN — Medication 250 ML: at 08:59

## 2022-12-16 RX ADMIN — Medication 50 MG: at 08:44

## 2022-12-16 NOTE — PROGRESS NOTES
Infusion Nursing Note:  Chayo CARMICHAEL Jac presents today for Ocrevus.    Patient seen by provider today: No   present during visit today: Not Applicable.    Note: First time doing rapid today.  Rates:   Started at 100 mL/hr for the first 15 minutes, then increased to 200 mL/hr for the next 15 minutes, then increased to 250 mL/hr for the next 30 minutes, then increased to 300 mL/hr for the remaining 60 minutes.    Intravenous Access:  Peripheral IV placed.    Treatment Conditions:  Biological Infusion Checklist:  ~~~ NOTE: If the patient answers yes to any of the questions below, hold the infusion and contact ordering provider or on-call provider.    1. Have you recently had an elevated temperature, fever, chills, productive cough, coughing for 3 weeks or longer or hemoptysis, abnormal vital signs, night sweats,  chest pain or have you noticed a decrease in your appetite, unexplained weight loss or fatigue? No  2. Do you have any open wounds or new incisions? No  3. Do you have any recent or upcoming hospitalizations, surgeries or dental procedures? No  4. Do you currently have or recently have had any signs of illness or infection or are you on any antibiotics? No  5. Have you had any new, sudden or worsening abdominal pain? No  6. Have you or anyone in your household received a live vaccination in the past 4 weeks? Please note:  No live vaccines while on biologic/chemotherapy until 6 months after the last treatment.  Patient can receive the flu vaccine (shot only) and the pneumovax.  It is optimal for the patient to get these vaccines mid cycle, but they can be given at any time as long as it is not on the day of the infusion. No  7. Have you recently been diagnosed with any new nervous system diseases (ie. Multiple sclerosis, Guillain [place], seizures, neurological changes) or cancer diagnosis? No  8. Are you on any form of radiation or chemotherapy? No  9. Have there been any other new onset medical  symptoms? No    Post Infusion Assessment:  Patient tolerated infusion without incident.  Patient observed for 60 minutes post Ocrevus per protocol.  Site patent and intact, free from redness, edema or discomfort.  No evidence of extravasations.  Access discontinued per protocol.  Biologic Infusion Post Education: Call the triage nurse at your clinic or seek medical attention if you have chills and/or temperature greater than or equal to 100.5, uncontrolled nausea/vomiting, diarrhea, constipation, dizziness, shortness of breath, chest pain, heart palpitations, weakness or any other new or concerning symptoms, questions or concerns.  You cannot have any live virus vaccines prior to or during treatment or up to 6 months post infusion.  If you have an upcoming surgery, medical procedure or dental procedure during treatment, this should be discussed with your ordering physician and your surgeon/dentist.  If you are having any concerning symptom, if you are unsure if you should get your next infusion or wish to speak to a provider before your next infusion, please call your care coordinator or triage nurse at your clinic to notify them so we can adequately serve you.     Discharge Plan:   Discharge instructions reviewed with: Patient.  Patient and/or family verbalized understanding of discharge instructions and all questions answered.  Patient discharged in stable condition accompanied by: self.  Departure Mode: Ambulatory.      Vanessa Burns RN

## 2023-04-03 ENCOUNTER — OFFICE VISIT (OUTPATIENT)
Dept: NEUROLOGY | Facility: CLINIC | Age: 48
End: 2023-04-03
Payer: OTHER GOVERNMENT

## 2023-04-03 VITALS
HEIGHT: 69 IN | SYSTOLIC BLOOD PRESSURE: 123 MMHG | DIASTOLIC BLOOD PRESSURE: 80 MMHG | HEART RATE: 73 BPM | BODY MASS INDEX: 34.8 KG/M2 | WEIGHT: 235 LBS

## 2023-04-03 DIAGNOSIS — G35 MS (MULTIPLE SCLEROSIS) (H): Primary | ICD-10-CM

## 2023-04-03 DIAGNOSIS — D50.9 IRON DEFICIENCY ANEMIA, UNSPECIFIED IRON DEFICIENCY ANEMIA TYPE: ICD-10-CM

## 2023-04-03 DIAGNOSIS — R20.0 NUMBNESS OF LEFT HAND: ICD-10-CM

## 2023-04-03 PROCEDURE — 99214 OFFICE O/P EST MOD 30 MIN: CPT | Performed by: PSYCHIATRY & NEUROLOGY

## 2023-04-03 ASSESSMENT — PAIN SCALES - GENERAL: PAINLEVEL: NO PAIN (0)

## 2023-04-03 NOTE — LETTER
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    This is a patient who I saw on one previous occasion in October of last year after she transferred her care from my former colleague, Dr. Maryann Hooks.  At that time, she related a history of symptoms of demyelinating disease dating back approximately 10 years when she noted a dysesthetic burning sensation in the legs when going into water.  She also had a probable episode of optic neuritis at one point.  She was not formally diagnosed with MS until 2018, after investigations performed to investigate a new left foot drop.  She was initially treated with glatiramer acetate, and then therapy was escalated to natalizumab.  Most recently, she has been on ocrelizumab since October 2021 after LILIANE virus seroconversion.    Today, she reports continued good tolerance of the ocrelizumab infusions, with the last infusion having been performed on 12/16/2022.  She denies any new episodic changes in vision, balance, strength or sensation suggestive of new relapse of multiple sclerosis since she was last seen.    Symptomatically, she describes some mild residual difficulty with balance.  She notes that she is uncoordinated with jumping, for example.    Earlier this winter, she had noticed some numbness in the left hand in the fourth and fifth digits.  This improved after she had her  make some ergonomic modifications to her home work station.    PHYSICAL EXAMINATION:    VITALS:  Blood pressure 123/80; pulse 73; weight 106.6 kg; height 1.75 meters.  GENERAL:  Well-nourished woman who presents to the examination alone, awake and alert and in no acute  distress.     NEUROLOGIC EXAMINATION:   CRANIAL NERVES:  Visual fields are full to confrontation.  Extraocular movements are intact with no internuclear ophthalmoplegia.  Facial strength is normal.  Palate elevation and tongue protrusion are normal.   POWER:  Strength is within normal limits in proximal and distal muscles in the upper and lower limbs throughout.   REFLEXES:  Reflexes are mildly increased in the left arm and leg as compared to the right.   MOTOR/CEREBELLAR:  There is no appendicular ataxia on finger-to-nose testing.  Rapid alternating movements are within normal limits in the hands and fingers.  There is no pronator drift in the arms.   GAIT:  The patient is able to ambulate independently on a flat, level surface with no gross loss of postural stability.  She can walk on heels, toes and in tandem.    Investigations:  I reviewed the results of laboratory studies performed on the date of the patient's previous ocrelizumab infusion.  Her vitamin D level was too high at 126 mcg per liter.  CD19 count was less than 1%, as desired, and liver function tests were normal.  A complete blood count with differential demonstrated findings consistent with iron deficiency anemia with a hemoglobin of 9.1 mg/dL and MCV of 70, which is not due to her treatment with ocrelizumab.  She does endorse some ice pica at present.    Assessment/plan:     1.  Multiple sclerosis  The patient is clinically stable on disease modifying therapy with ocrelizumab, which she will continue, with the next infusion due to be performed as scheduled on 06/16/2023.    I instructed her to reduce her dose of vitamin D to no more than 2000 units per day.  We will recheck this prior to the next infusion along with other routine labs for monitoring of ocrelizumab to include complete blood counts with differential, hepatic panel, and total antibody (IgG) level.    I will see her back in 6 months for a review with MRI scans of the brain and cervical  spine to be performed prior to that visit for monitoring of the radiologic stability of her condition.    2.  Left hand numbness  By description, this presumably was representative of an ulnar neuropathy, most likely at the elbow.  I instructed her to avoid resting her elbows on a table or other flat surfaces.  Presently these symptoms have improved.    3.  Iron deficiency anemia  I instructed her to contact her hematologist to discuss treatment of iron deficiency.  She has received IV iron infusions in the past.  Due to previous Tamika-en-Y gastric bypass surgery, absorption of oral iron may be somewhat compromised.    Cc:  RADHA Juárez (Hematology-Oncology)  Patient

## 2023-04-03 NOTE — PATIENT INSTRUCTIONS
Proceed with next Ocrevus infusion in June as scheduled    2.   MRI scans of the brain and cervical spine in 6 months, return after

## 2023-04-03 NOTE — Clinical Note
4/3/2023         RE: Chaoy Nathan  [address] [facility] [address]        Dear Colleague,    Thank you for referring your patient, Chayo Nathan, to the [facility] [place]. Please see a copy of my visit note below.    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    This is a patient who I saw on one previous occasion in October of last year after she transferred her care from my former colleague, Dr. Maryann Hooks.  At that time, she related a history of symptoms of demyelinating disease dating back approximately 10 years when she noted a dysesthetic burning sensation in the legs when going into water.  She also had a probable episode of optic neuritis at one point.  She was not formally diagnosed with MS until 2018, after investigations performed to investigate a new left foot drop.  She was initially treated with glatiramer acetate, and then therapy was escalated to natalizumab.  Most recently, she has been on ocrelizumab since October 2021 after LILIANE virus seroconversion.    Today, she reports continued good tolerance of the ocrelizumab infusions, with the last infusion having been performed on 12/16/2022.  She denies any new episodic changes in vision, balance, strength or sensation suggestive of new relapse of multiple sclerosis since she was last seen.    Symptomatically, she describes some mild residual difficulty with balance.  She notes that she is uncoordinated with jumping, for example.    Earlier this winter, she had noticed some numbness in the left hand in the fourth and fifth digits.  This improved after she had her  make some ergonomic modifications to her home work  station.    PHYSICAL EXAMINATION:    VITALS:  Blood pressure 123/80; pulse 73; weight 106.6 kg; height 1.75 meters.  GENERAL:  Well-nourished woman who presents to the examination alone, awake and alert and in no acute distress.     NEUROLOGIC EXAMINATION:   CRANIAL NERVES:  Visual fields are full to confrontation.  Extraocular movements are intact with no internuclear ophthalmoplegia.  Facial strength is normal.  Palate elevation and tongue protrusion are normal.   POWER:  Strength is within normal limits in proximal and distal muscles in the upper and lower limbs throughout.   REFLEXES:  Reflexes are mildly increased in the left arm and leg as compared to the right.   MOTOR/CEREBELLAR:  There is no appendicular ataxia on finger-to-nose testing.  Rapid alternating movements are within normal limits in the hands and fingers.  There is no pronator drift in the arms.   GAIT:  The patient is able to ambulate independently on a flat, level surface with no gross loss of postural stability.  She can walk on heels, toes and in tandem.    Investigations:  I reviewed the results of laboratory studies performed on the date of the patient's previous ocrelizumab infusion.  Her vitamin D level was too high at 126 mcg per liter.  CD19 count was less than 1%, as desired, and liver function tests were normal.  A complete blood count with differential demonstrated findings consistent with iron deficiency anemia with a hemoglobin of 9.1 mg/dL and MCV of 70, which is not due to her treatment with ocrelizumab.  She does endorse some ice pica at present.    Assessment/plan:     1.  Multiple sclerosis  The patient is clinically stable on disease modifying therapy with ocrelizumab, which she will continue, with the next infusion due to be performed as scheduled on 06/16/2023.    I instructed her to reduce her dose of vitamin D to no more than 2000 units per day.  We will recheck this prior to the next infusion along with other routine  labs for monitoring of ocrelizumab to include complete blood counts with differential, hepatic panel, and total antibody (IgG) level.    I will see her back in 6 months for a review with MRI scans of the brain and cervical spine to be performed prior to that visit for monitoring of the radiologic stability of her condition.    2.  Left hand numbness  By description, this presumably was representative of an ulnar neuropathy, most likely at the elbow.  I instructed her to avoid resting her elbows on a table or other flat surfaces.  Presently these symptoms have improved.    3.  Iron deficiency anemia  I instructed her to contact her hematologist to discuss treatment of iron deficiency.  She has received IV iron infusions in the past.  Due to previous Tamika-en-Y gastric bypass surgery, absorption of oral iron may be somewhat compromised.    Luiz Love MD        D: 2023   T: 2023   MT: IMANI    Name:     YOSEF ALEMAN  MRN:      [id number]        Account:    [id number]   :      1975           Visit Date: 2023     Document: [id number]      Again, thank you for allowing me to participate in the care of your patient.        Sincerely,        Luiz Love MD

## 2023-04-03 NOTE — NURSING NOTE
"Chayo Nathan's goals for this visit include:   Chief Complaint   Patient presents with     RECHECK     6 month follow up        She requests these members of her care team be copied on today's visit information: yes    PCP: Clinic, [facility]    Referring Provider:  No referring provider defined for this encounter.    /80   Pulse 73   Ht 1.753 m (5' 9\")   Wt 106.6 kg (235 lb)   BMI 34.70 kg/m      Do you need any medication refills at today's visit? No  CHARLI Guzman, CMA ([facility])      "

## 2023-04-03 NOTE — PROGRESS NOTES
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    This is a patient who I saw on one previous occasion in October of last year after she transferred her care from my former colleague, Dr. Maryann Hooks.  At that time, she related a history of symptoms of demyelinating disease dating back approximately 10 years when she noted a dysesthetic burning sensation in the legs when going into water.  She also had a probable episode of optic neuritis at one point.  She was not formally diagnosed with MS until 2018, after investigations performed to investigate a new left foot drop.  She was initially treated with glatiramer acetate, and then therapy was escalated to natalizumab.  Most recently, she has been on ocrelizumab since October 2021 after LILIANE virus seroconversion.    Today, she reports continued good tolerance of the ocrelizumab infusions, with the last infusion having been performed on 12/16/2022.  She denies any new episodic changes in vision, balance, strength or sensation suggestive of new relapse of multiple sclerosis since she was last seen.    Symptomatically, she describes some mild residual difficulty with balance.  She notes that she is uncoordinated with jumping, for example.    Earlier this winter, she had noticed some numbness in the left hand in the fourth and fifth digits.  This improved after she had her  make some ergonomic modifications to her home work station.    PHYSICAL EXAMINATION:    VITALS:  Blood pressure 123/80; pulse 73; weight 106.6 kg; height 1.75 meters.  GENERAL:  Well-nourished woman who presents to the examination alone, awake and alert and in no acute distress.     NEUROLOGIC EXAMINATION:   CRANIAL NERVES:   Visual fields are full to confrontation.  Extraocular movements are intact with no internuclear ophthalmoplegia.  Facial strength is normal.  Palate elevation and tongue protrusion are normal.   POWER:  Strength is within normal limits in proximal and distal muscles in the upper and lower limbs throughout.   REFLEXES:  Reflexes are mildly increased in the left arm and leg as compared to the right.   MOTOR/CEREBELLAR:  There is no appendicular ataxia on finger-to-nose testing.  Rapid alternating movements are within normal limits in the hands and fingers.  There is no pronator drift in the arms.   GAIT:  The patient is able to ambulate independently on a flat, level surface with no gross loss of postural stability.  She can walk on heels, toes and in tandem.    Investigations:  I reviewed the results of laboratory studies performed on the date of the patient's previous ocrelizumab infusion.  Her vitamin D level was too high at 126 mcg per liter.  CD19 count was less than 1%, as desired, and liver function tests were normal.  A complete blood count with differential demonstrated findings consistent with iron deficiency anemia with a hemoglobin of 9.1 mg/dL and MCV of 70, which is not due to her treatment with ocrelizumab.  She does endorse some ice pica at present.    Assessment/plan:     1.  Multiple sclerosis  The patient is clinically stable on disease modifying therapy with ocrelizumab, which she will continue, with the next infusion due to be performed as scheduled on 06/16/2023.    I instructed her to reduce her dose of vitamin D to no more than 2000 units per day.  We will recheck this prior to the next infusion along with other routine labs for monitoring of ocrelizumab to include complete blood counts with differential, hepatic panel, and total antibody (IgG) level.    I will see her back in 6 months for a review with MRI scans of the brain and cervical spine to be performed prior to that visit for monitoring  of the radiologic stability of her condition.    2.  Left hand numbness  By description, this presumably was representative of an ulnar neuropathy, most likely at the elbow.  I instructed her to avoid resting her elbows on a table or other flat surfaces.  Presently these symptoms have improved.    3.  Iron deficiency anemia  I instructed her to contact her hematologist to discuss treatment of iron deficiency.  She has received IV iron infusions in the past.  Due to previous Tamika-en-Y gastric bypass surgery, absorption of oral iron may be somewhat compromised.    Luiz Love MD        D: 2023   T: 2023   MT: IMANI    Name:     YOSEF ALEMAN  MRN:      [id number]        Account:    [id number]   :      1975           Visit Date: 2023     Document: [id number]

## 2023-05-07 ENCOUNTER — HEALTH MAINTENANCE LETTER (OUTPATIENT)
Age: 48
End: 2023-05-07

## 2023-06-04 ENCOUNTER — HEALTH MAINTENANCE LETTER (OUTPATIENT)
Age: 48
End: 2023-06-04

## 2023-06-16 ENCOUNTER — INFUSION THERAPY VISIT (OUTPATIENT)
Dept: INFUSION THERAPY | Facility: CLINIC | Age: 48
End: 2023-06-16
Payer: OTHER GOVERNMENT

## 2023-06-16 ENCOUNTER — LAB (OUTPATIENT)
Dept: LAB | Facility: CLINIC | Age: 48
End: 2023-06-16
Payer: OTHER GOVERNMENT

## 2023-06-16 VITALS
RESPIRATION RATE: 16 BRPM | SYSTOLIC BLOOD PRESSURE: 122 MMHG | DIASTOLIC BLOOD PRESSURE: 76 MMHG | TEMPERATURE: 98.1 F | HEART RATE: 66 BPM | OXYGEN SATURATION: 99 %

## 2023-06-16 DIAGNOSIS — G35 MS (MULTIPLE SCLEROSIS) (H): ICD-10-CM

## 2023-06-16 DIAGNOSIS — G35 MULTIPLE SCLEROSIS (H): Primary | ICD-10-CM

## 2023-06-16 LAB
ALBUMIN SERPL BCG-MCNC: 4.1 G/DL (ref 3.5–5.2)
ALP SERPL-CCNC: 87 U/L (ref 35–104)
ALT SERPL W P-5'-P-CCNC: 13 U/L (ref 0–50)
AST SERPL W P-5'-P-CCNC: 23 U/L (ref 0–45)
BASOPHILS # BLD AUTO: 0.1 10E3/UL (ref 0–0.2)
BASOPHILS NFR BLD AUTO: 2 %
BILIRUB DIRECT SERPL-MCNC: <0.2 MG/DL (ref 0–0.3)
BILIRUB SERPL-MCNC: 0.2 MG/DL
DEPRECATED CALCIDIOL+CALCIFEROL SERPL-MC: 53 UG/L (ref 20–75)
EOSINOPHIL # BLD AUTO: 0.3 10E3/UL (ref 0–0.7)
EOSINOPHIL NFR BLD AUTO: 5 %
ERYTHROCYTE [DISTWIDTH] IN BLOOD BY AUTOMATED COUNT: ABNORMAL %
HCT VFR BLD AUTO: 38.1 % (ref 35–47)
HGB BLD-MCNC: 12.1 G/DL (ref 11.7–15.7)
IMM GRANULOCYTES # BLD: 0 10E3/UL
IMM GRANULOCYTES NFR BLD: 0 %
LYMPHOCYTES # BLD AUTO: 1.3 10E3/UL (ref 0.8–5.3)
LYMPHOCYTES NFR BLD AUTO: 24 %
MCH RBC QN AUTO: 24.2 PG (ref 26.5–33)
MCHC RBC AUTO-ENTMCNC: 31.8 G/DL (ref 31.5–36.5)
MCV RBC AUTO: 76 FL (ref 78–100)
MONOCYTES # BLD AUTO: 0.6 10E3/UL (ref 0–1.3)
MONOCYTES NFR BLD AUTO: 11 %
NEUTROPHILS # BLD AUTO: 3.1 10E3/UL (ref 1.6–8.3)
NEUTROPHILS NFR BLD AUTO: 58 %
NRBC # BLD AUTO: 0 10E3/UL
NRBC BLD AUTO-RTO: 0 /100
PLATELET # BLD AUTO: 341 10E3/UL (ref 150–450)
PROT SERPL-MCNC: 6.6 G/DL (ref 6.4–8.3)
RBC # BLD AUTO: 5 10E6/UL (ref 3.8–5.2)
WBC # BLD AUTO: 5.3 10E3/UL (ref 4–11)

## 2023-06-16 PROCEDURE — 82306 VITAMIN D 25 HYDROXY: CPT | Performed by: PSYCHIATRY & NEUROLOGY

## 2023-06-16 PROCEDURE — 80076 HEPATIC FUNCTION PANEL: CPT | Performed by: PSYCHIATRY & NEUROLOGY

## 2023-06-16 PROCEDURE — 99207 PR NO CHARGE LOS: CPT

## 2023-06-16 PROCEDURE — 96415 CHEMO IV INFUSION ADDL HR: CPT | Performed by: INTERNAL MEDICINE

## 2023-06-16 PROCEDURE — 36415 COLL VENOUS BLD VENIPUNCTURE: CPT | Performed by: PSYCHIATRY & NEUROLOGY

## 2023-06-16 PROCEDURE — 96413 CHEMO IV INFUSION 1 HR: CPT | Performed by: INTERNAL MEDICINE

## 2023-06-16 PROCEDURE — 96375 TX/PRO/DX INJ NEW DRUG ADDON: CPT | Performed by: INTERNAL MEDICINE

## 2023-06-16 PROCEDURE — 82784 ASSAY IGA/IGD/IGG/IGM EACH: CPT | Performed by: PSYCHIATRY & NEUROLOGY

## 2023-06-16 PROCEDURE — 85025 COMPLETE CBC W/AUTO DIFF WBC: CPT | Performed by: PSYCHIATRY & NEUROLOGY

## 2023-06-16 RX ORDER — ALBUTEROL SULFATE 90 UG/1
1-2 AEROSOL, METERED RESPIRATORY (INHALATION)
Status: CANCELLED
Start: 2023-06-16

## 2023-06-16 RX ORDER — ACETAMINOPHEN 325 MG/1
650 TABLET ORAL ONCE
Status: CANCELLED | OUTPATIENT
Start: 2023-06-16

## 2023-06-16 RX ORDER — ALBUTEROL SULFATE 0.83 MG/ML
2.5 SOLUTION RESPIRATORY (INHALATION)
Status: CANCELLED | OUTPATIENT
Start: 2023-06-16

## 2023-06-16 RX ORDER — DIPHENHYDRAMINE HCL 25 MG
50 CAPSULE ORAL ONCE
Status: COMPLETED | OUTPATIENT
Start: 2023-06-16 | End: 2023-06-16

## 2023-06-16 RX ORDER — MEPERIDINE HYDROCHLORIDE 25 MG/ML
25 INJECTION INTRAMUSCULAR; INTRAVENOUS; SUBCUTANEOUS EVERY 30 MIN PRN
Status: CANCELLED | OUTPATIENT
Start: 2023-06-16

## 2023-06-16 RX ORDER — METHYLPREDNISOLONE SODIUM SUCCINATE 125 MG/2ML
125 INJECTION, POWDER, LYOPHILIZED, FOR SOLUTION INTRAMUSCULAR; INTRAVENOUS
Status: CANCELLED
Start: 2023-06-16

## 2023-06-16 RX ORDER — ACETAMINOPHEN 325 MG/1
650 TABLET ORAL ONCE
Status: COMPLETED | OUTPATIENT
Start: 2023-06-16 | End: 2023-06-16

## 2023-06-16 RX ORDER — METHYLPREDNISOLONE SODIUM SUCCINATE 125 MG/2ML
125 INJECTION, POWDER, LYOPHILIZED, FOR SOLUTION INTRAMUSCULAR; INTRAVENOUS ONCE
Status: COMPLETED | OUTPATIENT
Start: 2023-06-16 | End: 2023-06-16

## 2023-06-16 RX ORDER — METHYLPREDNISOLONE SODIUM SUCCINATE 125 MG/2ML
125 INJECTION, POWDER, LYOPHILIZED, FOR SOLUTION INTRAMUSCULAR; INTRAVENOUS ONCE
Status: CANCELLED | OUTPATIENT
Start: 2023-06-16

## 2023-06-16 RX ORDER — EPINEPHRINE 1 MG/ML
0.3 INJECTION, SOLUTION INTRAMUSCULAR; SUBCUTANEOUS EVERY 5 MIN PRN
Status: CANCELLED | OUTPATIENT
Start: 2023-06-16

## 2023-06-16 RX ORDER — DIPHENHYDRAMINE HCL 25 MG
50 CAPSULE ORAL ONCE
Status: CANCELLED | OUTPATIENT
Start: 2023-06-16

## 2023-06-16 RX ORDER — DIPHENHYDRAMINE HYDROCHLORIDE 50 MG/ML
50 INJECTION INTRAMUSCULAR; INTRAVENOUS
Status: CANCELLED
Start: 2023-06-16

## 2023-06-16 RX ADMIN — Medication 250 ML: at 08:39

## 2023-06-16 RX ADMIN — Medication 50 MG: at 08:29

## 2023-06-16 RX ADMIN — ACETAMINOPHEN 650 MG: 325 TABLET ORAL at 08:29

## 2023-06-16 RX ADMIN — METHYLPREDNISOLONE SODIUM SUCCINATE 125 MG: 125 INJECTION INTRAMUSCULAR; INTRAVENOUS at 08:44

## 2023-06-16 NOTE — PROGRESS NOTES
Infusion Nursing Note:  Chayo Nathan presents today for Ocrevus.    Patient seen by provider today: No   present during visit today: Not Applicable.    Note: The patient reports feeling well today and denies any concerns at this time.    Started at 100 mL/hr for the first 15 minutes, then increased to 200 mL/hr for the next 15 minutes, then increased to 250 mL/hr for the next 30 minutes, then increased to 300 mL/hr for the remaining 60 minutes.    Intravenous Access:  Peripheral IV placed.    Treatment Conditions:  Biological Infusion Checklist:  ~~~ NOTE: If the patient answers yes to any of the questions below, hold the infusion and contact ordering provider or on-call provider.    1. Have you recently had an elevated temperature, fever, chills, productive cough, coughing for 3 weeks or longer or hemoptysis,  abnormal vital signs, night sweats,  chest pain or have you noticed a decrease in your appetite, unexplained weight loss or fatigue? No  2. Do you have any open wounds or new incisions? No  3. Do you have any upcoming hospitalizations or surgeries? Does not include esophagogastroduodenoscopy, colonoscopy, endoscopic retrograde cholangiopancreatography (ERCP), endoscopic ultrasound (EUS), dental procedures or joint aspiration/steroid injections No  4. Do you currently have any signs of illness or infection or are you on any antibiotics? No  5. Have you had any new, sudden or worsening abdominal pain? No  6. Have you or anyone in your household received a live vaccination in the past 4 weeks? Please note: No live vaccines while on biologic/chemotherapy until 6 months after the last treatment. Patient can receive the flu vaccine (shot only), pneumovax and the Covid vaccine. It is optimal for the patient to get these vaccines mid cycle, but they can be given at any time as long as it is not on the day of the infusion. No  7. Have you recently been diagnosed with any new nervous system diseases (ie.  Multiple sclerosis, Guillain [place], seizures, neurological changes) or cancer diagnosis? Are you on any form of radiation or chemotherapy? No  8. Are you pregnant or breast feeding or do you have plans of pregnancy in the future? No  9. Have you been having any signs of worsening depression or suicidal ideations?  (benlysta only) No  10. Have there been any other new onset medical symptoms? No  11. Have you had any new blood clots? (IVIG only) No        Post Infusion Assessment:  Patient tolerated infusion without incident.  Patient observed for 60 minutes post Ocrevus per protocol.  Site patent and intact, free from redness, edema or discomfort.  No evidence of extravasations.  Access discontinued per protocol.  Biologic Infusion Post Education: Call the triage nurse at your clinic or seek medical attention if you have chills and/or temperature greater than or equal to 100.5, uncontrolled nausea/vomiting, diarrhea, constipation, dizziness, shortness of breath, chest pain, heart palpitations, weakness or any other new or concerning symptoms, questions or concerns.  You cannot have any live virus vaccines prior to or during treatment or up to 6 months post infusion.  If you have an upcoming surgery, medical procedure or dental procedure during treatment, this should be discussed with your ordering physician and your surgeon/dentist.  If you are having any concerning symptom, if you are unsure if you should get your next infusion or wish to speak to a provider before your next infusion, please call your care coordinator or triage nurse at your clinic to notify them so we can adequately serve you.     Discharge Plan:   AVS to patient via [employer].  Patient was directed to scheduling to make future appointments.   Patient discharged in stable condition accompanied by: self.  Departure Mode: Ambulatory.      Tata Alberts RN

## 2023-06-19 LAB — IGG SERPL-MCNC: 909 MG/DL (ref 610–1616)

## 2023-07-20 NOTE — PATIENT INSTRUCTIONS
If you have any questions regarding your visit, Please contact your care team.     [facility]: [phone]  To Schedule an Appointment 24/7  Call: [phone] HOU[facility] TELEPHONE NUM[facility]   Sasha Andrew, DNP, APRN, NP-BC    Charity - Surgery Scheduler  Brandie - Surgery Scheduler    MEDARDO Espinoza, MEDARDO Granda RN   Monday- [place]  8:00 am - 12:00 pm    Tuesday- [place]  8:00 am - 4:30 pm    Wednesday- [place]  8:00 am - 4:30 pm    Thursday- [place]  8:00 am - 4:30 pm    Friday- [place]  8:00 am - 4:30 pm [facility]  [address] [address] [address]  Phone: [phone]   Fax: [phone]     Imaging Scheduling-All Locations [phone]    [facility]  [address] [place] [address]     Urgent Care locations:  [facility] Monday-Friday   10 am - 8 pm  Saturday and Sunday   9 am - 5 pm [phone]    Labor and Delivery:  [phone]    If you need a medication refill, please contact your pharmacy. Please allow 3 business days for your refill to be completed.  As always, Thank you for trusting us with your healthcare needs!  see additional instructions from your care team below

## 2023-07-21 ENCOUNTER — OFFICE VISIT (OUTPATIENT)
Dept: OBGYN | Facility: CLINIC | Age: 48
End: 2023-07-21
Payer: OTHER GOVERNMENT

## 2023-07-21 VITALS
WEIGHT: 256.8 LBS | OXYGEN SATURATION: 98 % | SYSTOLIC BLOOD PRESSURE: 118 MMHG | HEART RATE: 77 BPM | BODY MASS INDEX: 37.92 KG/M2 | DIASTOLIC BLOOD PRESSURE: 80 MMHG

## 2023-07-21 DIAGNOSIS — Z12.4 CERVICAL CANCER SCREENING: ICD-10-CM

## 2023-07-21 DIAGNOSIS — G35 MULTIPLE SCLEROSIS (H): ICD-10-CM

## 2023-07-21 DIAGNOSIS — Z01.419 WELL WOMAN EXAM WITH ROUTINE GYNECOLOGICAL EXAM: Primary | ICD-10-CM

## 2023-07-21 DIAGNOSIS — N92.0 MENORRHAGIA WITH REGULAR CYCLE: ICD-10-CM

## 2023-07-21 DIAGNOSIS — Z12.31 VISIT FOR SCREENING MAMMOGRAM: ICD-10-CM

## 2023-07-21 DIAGNOSIS — N94.6 DYSMENORRHEA: ICD-10-CM

## 2023-07-21 DIAGNOSIS — N39.46 MIXED STRESS AND URGE URINARY INCONTINENCE: ICD-10-CM

## 2023-07-21 LAB
CHOLEST SERPL-MCNC: 194 MG/DL
HBA1C MFR BLD: 5 % (ref 0–5.6)
HDLC SERPL-MCNC: 65 MG/DL
LDLC SERPL CALC-MCNC: 106 MG/DL
NONHDLC SERPL-MCNC: 129 MG/DL
TRIGL SERPL-MCNC: 115 MG/DL
TSH SERPL DL<=0.005 MIU/L-ACNC: 2.02 UIU/ML (ref 0.3–4.2)

## 2023-07-21 PROCEDURE — 80061 LIPID PANEL: CPT

## 2023-07-21 PROCEDURE — 87624 HPV HI-RISK TYP POOLED RSLT: CPT

## 2023-07-21 PROCEDURE — 99386 PREV VISIT NEW AGE 40-64: CPT

## 2023-07-21 PROCEDURE — 83036 HEMOGLOBIN GLYCOSYLATED A1C: CPT

## 2023-07-21 PROCEDURE — 84443 ASSAY THYROID STIM HORMONE: CPT

## 2023-07-21 PROCEDURE — 36415 COLL VENOUS BLD VENIPUNCTURE: CPT

## 2023-07-21 PROCEDURE — G0145 SCR C/V CYTO,THINLAYER,RESCR: HCPCS

## 2023-07-21 NOTE — PROGRESS NOTES
"SUBJECTIVE:  Chayo Nathan is an 48 year old, No obstetric history on file., who presents for an Annual Preventive Well Woman Exam.     Concerns patient would like to discuss today:   Heavy menstrual bleeding that results in anemia and need for Iron transfusions multiple times yearly.  Family history of DM. Requests screening.  Weight loss referral requested. Getting more difficult to manage weight as her MS progresses.    GYNECOLOGIC HISTORY:    Her LMP is 7/07/2023.   Menstrual cycles are described as very regular, occurring \"every 28 days on the nose.\"  She reports many years now of very heavy and clotty periods.   She reports flow lasts 10-14 days.   Flow is very heavy the first days requiring super tampon change every 1-2 hours along with the use of super/overnight pad and she still experiences leaks. At times she passes clots that are the size of large marbles to golf ball size, but has passed clots the size of baseballs.  Cramping is worst with passing of clots, mild to moderate otherwise.  She has had to call out of work multiple times due to bleeding.  She receives Iron infusions for TERRI at least 2 x year.  She reports intermenstrual bleeding.  Patient reports post-coital bleeding.  She reports a previous OB/GYN told her she had large uterine fibroids and recommended hysterectomy. She had wanted ablation but was told the fibroid prevented this. She previously declined surgical intervention however feels that this has gone on so long and become so disruptive to her daily life and quality of life that she is now interested in discussing all of her options  including hysterectomy.    Patient is currently sexually active with 1 male partner(s).   Patient denies history of STI or PID.    Patient is currently using tubal ligation for contraception.   She does not desire STI testing at her visit today.    Patient denies concerns regarding sexual function including concerns regarding sexual arousal, orgasm, or " dyspareunia.   Patient reports problems with urination. She reports urgency and stress incontinence.  Patient reports bowel problems. She has had several episodes of stool incontinence which she attributes to her MS.    Patient reports occasional night sweats requiring sheet change - this occurs several times monthly.  Mom and siblings went through menopause in their early 40's.  Patient will be using Accutane for cystic facial acne. She will begin this in August. Plan for low dose course over 9 months.    Last PAP: 3-4 years  Next PAP due: uncertain  History of abnormal Pap smear: Denies    Mammogram current: no (patient will schedule)  Last Mammogram: 1/07/2021  Regular self breast exam: Yes  Personal history of breast cancer: No  Family history of breast cancer: No    Personal history of uterine, ovarian, or colon cancer: No  Family history of uterine or ovarian cancer: Yes: Maternal grandma uterine cancer in 80's  Family history of colon cancer: No      HEALTH MAINTENANCE HISTORY:    Last Cholesterol:  No results found for: CHOL    Last TSH:  No results found for: TSH   (history of right thyroidectomy)  Last Colon Cancer Screening: NA    Are immunizations up to date: Yes    SOCIAL HISTORY:  Have you had an eye exam in the last 2 years? Yes  Do you see your dentist at least 1 time per year? Yes  Do you consume dairy products? Yes  Do you consume meat products? Yes    Do you exercise regularly? No    Patient denies tobacco use.  Patient reports rare alcohol use.  Do you feel safe where you live? Yes    Last PHQ-2 score on record:      4/3/2023     8:04 AM 1/6/2022     3:52 PM   PHQ-2 ( 1999 Pfizer)   Q1: Little interest or pleasure in doing things 0 0   Q2: Feeling down, depressed or hopeless 0 0   PHQ-2 Score 0 0      Last PHQ-9 score on record:        No data to display              Last GAD7 score on record:        No data to display                  HISTORY:  Prescription Medications as of 7/21/2023         Rx  Number Disp Refills Start End Last Dispensed Date Next Fill Date Owning Pharmacy    Biotin 2500 MCG CAPS            Sig: Take by mouth daily    Class: Historical    Route: Oral    multivitamin w/minerals (THERA-VIT-M) tablet            Sig: Take 1 tablet by mouth daily    Class: Historical    Route: Oral    ocrelizumab (OCREVUS) 300 MG/10ML SOLN injection            Sig: Every six months    Class: Historical    vitamin B-12 (CYANOCOBALAMIN) 2500 MCG sublingual tablet            Sig: Take 2,500 mcg by mouth daily    Class: Historical    Route: Oral    vitamin C (ASCORBIC ACID) 1000 MG TABS            Sig: Take 1,000 mg by mouth daily    Class: Historical    Route: Oral    vitamin D3 (CHOLECALCIFEROL) 89759 units (250 mcg) capsule  15 capsule 11 11/7/2019    [id number] [address]    Sig: Take one tablet every other day    Class: E-Prescribe          Clinic-Administered Medications as of 7/21/2023         Dose Frequency Start End    lidocaine 1% with EPINEPHrine 1:100,000 injection 3 mL 3 mL ONCE 8/5/2021     Route: Intradermal          Allergies   Allergen Reactions    Bees Anaphylaxis    Ferumoxytol Difficulty breathing    Penicillin G Rash    Sulfa Antibiotics Rash     Immunization History   Administered Date(s) Administered    COVID-19 MONOVALENT 12+ (Pfizer) 12/23/2020, 01/15/2021    FLU 6-35 months 10/08/2008    Flu, Unspecified 10/08/2008, 10/15/2020    Influenza (High Dose) 3 valent vaccine 10/20/2015    Influenza (IIV3) PF 12/18/2017    Influenza Vaccine >6 months (Alfuria,Fluzone) 09/25/2018, 10/25/2021    Influenza Vaccine, 6+MO IM (QUADRIVALENT W/PRESERVATIVES) 09/19/2016    TDAP (Adacel,Boostrix) 11/16/2016    Td (Adult), Adsorbed 08/05/2004       OB History   No obstetric history on file.     No past medical history on file.  No past surgical history on file.  Family History   Problem Relation Age of Onset    Melanoma Father     Melanoma Sister      Social  History     Socioeconomic History    Marital status:    Tobacco Use    Smoking status: Never    Smokeless tobacco: Never      reports that she has never smoked. She has never used smokeless tobacco.    REVIEW of SYSTEMS:  ROS: 10 point ROS neg other than the symptoms noted above in the HPI.    EXAM:  Vitals: /80 (BP Location: Right arm, Patient Position: Chair, Cuff Size: Adult Regular)   Pulse 77   Wt 116.5 kg (256 lb 12.8 oz)   LMP 07/07/2023   SpO2 98%   BMI 37.92 kg/m    General - pleasant female in no acute distress.  Skin - no suspicious lesions or rashes  EENT-  PERRLA,   Neck - supple without lymphadenopathy, euthyroid with out palpable nodules  Lungs - clear to auscultation bilaterally.  Heart - regular rate and rhythm without murmur.  Abdomen - soft, nontender, nondistended, no masses or organomegaly noted.  Musculoskeletal - no gross deformities.  Neurological - normal strength, sensation, and mental status.    Breast Exam:  Breast: Without visible skin changes. No dimpling or lesions seen.   Breasts supple, non-tender with palpation, no dominant mass, nodularity, or nipple discharge noted bilaterally. Axillary nodes negative.      Pelvic Exam:  EG/BUS: Normal genital architecture without lesions, erythema or abnormal secretions Bartholin's, Urethra, [place]'s normal   Urethral meatus: normal   Urethra: no masses, tenderness, or scarring   Bladder: no masses or tenderness   Vagina: moist, pink, rugae with creamy, white and odorless  secretions  Cervix: no lesions and pink, moist, closed, without lesion or CMT  Uterus: anteverted, , small, smooth, firm, mobile w/o pain although exam findings limited by body habitus  Adnexa: Within normal limits and No masses, nodularity, tenderness although exam findings limited by body habitus    Rectum: deferred       ASSESSMENT/PLAN:     (Z01.419) Well woman exam with routine gynecological exam  (primary encounter diagnosis)  (N94.6)  Dysmenorrhea  (N92.0) Menorrhagia with regular cycle  (G35) Multiple sclerosis (H)  Comment: Routine well woman exam in a 48 year old female  with a history of MS. She is due for update of her PAP smear and would like to discuss her very heavy menstrual bleeding and options for management including surgical options.  Plan: Pap Screen with HPV - recommended age 30 - 65         years, MA SCREENING DIGITAL BILAT - Future          (s+30), Hemoglobin A1c, Lipid panel reflex to         direct LDL Fasting, US Pelvic Complete with         Transvaginal, Physical Therapy Referral, Adult         Comprehensive Weight Management          Referral, TSH with free T4 reflex   2023  ADDENDUM: TSH was normal (2.02), HGBA1c was normal (5.0), PAP/HPV was NILM/hrHPV NEGATIVE, and CBC 2023 was 12.1. Recommend follow-up with OBGYN surgeon to discuss all of her options especially in the setting of progressive MS condition.     (Z12.4) Cervical cancer screening  Comment: Routine screening  Plan: Pap Screen with HPV - recommended age 30 - 65 years    (Z12.31) Visit for screening mammogram  Comment: Routine screening  Plan: MA SCREENING DIGITAL BILAT - Future  (s+30)    (Z68.37) BMI 37.0-37.9, adult  Comment: Patient requests weight management referral  Plan: Adult Comprehensive Weight Management         Referral    (N39.46) Mixed stress and urge urinary incontinence  Comment: Patient describes mixed urinary incontinence and occasional stool incontinence (likely related to MS)  Plan: Physical Therapy Referral    Additional teaching done at this visit regarding calcium (1200 mg per day), self breast exam, exercise, birth control, perimenopause, mental health and weight/diet.      STD testing offered?  Declined    Diet: Specific dietary guidelines may vary, but in general, a healthy diet includes intake of fruits, vegetables, legumes, nuts, and whole grains each day. A healthy diet includes a variety of  "protein-rich foods which might include lean cuts of meat (turkey, chicken), tofu, legumes, beans, nuts, seeds, dairy, and some fish. You should limit or avoid red and processed meats, unhealthy fats (saturated and trans-fats), sugar, sodium, and alcohol.      Exercise: The American Heart Association recommends you get at least 150 minutes of moderate-intensity aerobic exercise per week, spread across several days. They also recommend women participate in strength-training exercises (such as resistance or weights) 2 days each week.    Calcium and Vitamin D: We recommend you try to get 8134-6827 mg of calcium (total of diet and supplement) and 600-800 international units of vitamin D daily. We recommend 3 servings of calcium and vitamin D-rich foods daily to achieve this.  Good sources of calcium include:  Milk, yogurt, and cheese  Certain green leafy vegetables, such as kale, spinach, bok charly, and peterson greens  Fish with bones, such as canned salmon, mackerel, and sardines  Tofu made with calcium carbonate (not the type of tofu called nagiri)  Drinks that have calcium added, such as some orange juice and rice and soy drinks  Good dietary sources of include:  Milk, fortified with Vitamin D    Health Maintenance  The following topics were discussed or recommended:  - Kegel exercises  - Seat belt use, helmet use, and sunscreen use  - Routine vision screening  - Twice-annual dental visits  - Mammogram, per guidelines and provider recommendations  - Cervical Cancer Screenings (\"PAP Smear\") per guidelines and provider recommendations  - Colon Cancer Screenings at age 45 (may be recommended earlier if you have a family history of colon cancer/disease)  - Calcium/Vitamin D supplement: Recommend 3362-1441 mg of calcium daily and 600-800 IU of vitamin D (total of diet and supplement).  - Folic Acid 400 - 800 mcg daily for women of childbearing age to prevent neural tube defects       Return to clinic in one year.  Follow-up " as needed.    Sasha Noble, ANDREZ CNP

## 2023-07-25 LAB
BKR LAB AP GYN ADEQUACY: NORMAL
BKR LAB AP GYN INTERPRETATION: NORMAL
BKR LAB AP HPV REFLEX: NORMAL
BKR LAB AP PREVIOUS ABNORMAL: NORMAL
PATH REPORT.COMMENTS IMP SPEC: NORMAL
PATH REPORT.COMMENTS IMP SPEC: NORMAL
PATH REPORT.RELEVANT HX SPEC: NORMAL

## 2023-07-27 LAB
HUMAN PAPILLOMA VIRUS 16 DNA: NEGATIVE
HUMAN PAPILLOMA VIRUS 18 DNA: NEGATIVE
HUMAN PAPILLOMA VIRUS FINAL DIAGNOSIS: NORMAL
HUMAN PAPILLOMA VIRUS OTHER HR: NEGATIVE

## 2023-07-28 ENCOUNTER — ANCILLARY PROCEDURE (OUTPATIENT)
Dept: ULTRASOUND IMAGING | Facility: CLINIC | Age: 48
End: 2023-07-28
Payer: OTHER GOVERNMENT

## 2023-07-28 ENCOUNTER — TELEPHONE (OUTPATIENT)
Dept: OBGYN | Facility: CLINIC | Age: 48
End: 2023-07-28

## 2023-07-28 DIAGNOSIS — Z01.419 WELL WOMAN EXAM WITH ROUTINE GYNECOLOGICAL EXAM: ICD-10-CM

## 2023-07-28 PROCEDURE — 76830 TRANSVAGINAL US NON-OB: CPT | Performed by: RADIOLOGY

## 2023-07-28 PROCEDURE — 76856 US EXAM PELVIC COMPLETE: CPT | Performed by: RADIOLOGY

## 2023-07-28 NOTE — TELEPHONE ENCOUNTER
----- Message from ANDREZ Alonzo CNP sent at 7/28/2023  4:28 PM CDT -----  Regarding: Schedule appointment with OBGYN Doctor  Would you please call Chayo and help her schedule with one of the OBGYN doctors. She wants to discuss all of her options including surgery for her heavy, painful periods which require iron transfusion several times a year. She also has MS which is becoming progressively more debilitating for her. She is aware you will be calling to help her set this up.  Thank you so much ladies!  ANDREZ Alonzo CNP      Unable to reach patient via phone. RN left a message and instructed patient to call the clinic at [phone].    Kalee Jeffries RN on 7/28/2023 at 4:39 PM

## 2023-09-22 ENCOUNTER — TELEPHONE (OUTPATIENT)
Dept: OBGYN | Facility: CLINIC | Age: 48
End: 2023-09-22

## 2023-09-22 ENCOUNTER — OFFICE VISIT (OUTPATIENT)
Dept: OBGYN | Facility: CLINIC | Age: 48
End: 2023-09-22
Payer: OTHER GOVERNMENT

## 2023-09-22 VITALS — DIASTOLIC BLOOD PRESSURE: 77 MMHG | SYSTOLIC BLOOD PRESSURE: 118 MMHG

## 2023-09-22 DIAGNOSIS — N93.9 ABNORMAL UTERINE BLEEDING (AUB): Primary | ICD-10-CM

## 2023-09-22 PROCEDURE — 58100 BIOPSY OF UTERUS LINING: CPT | Performed by: OBSTETRICS & GYNECOLOGY

## 2023-09-22 PROCEDURE — 99214 OFFICE O/P EST MOD 30 MIN: CPT | Mod: 25 | Performed by: OBSTETRICS & GYNECOLOGY

## 2023-09-22 PROCEDURE — 88305 TISSUE EXAM BY PATHOLOGIST: CPT | Performed by: STUDENT IN AN ORGANIZED HEALTH CARE EDUCATION/TRAINING PROGRAM

## 2023-09-22 NOTE — PATIENT INSTRUCTIONS
You will be scheduled for a total laparoscopic hysterectomy, removal of tubal remnants.  The total hysterectomy means that we will plan to remove the cervix with the uterus.  We plan to leave your ovaries in place.  We will remove any remnants of the fallopian tubes.      Our surgery scheduler will contact you in the next couple of days to help you schedule this surgery, as well as the preoperative and postoperative visits.      The surgery generally takes about 2 hours, sometimes longer.  You can expect to go home later that day.      You will be given instructions regarding pain management at the time of discharge after the procedure.  Generally we recommend ibuprofen and Tylenol in a scheduled fashion.  I will also send in a prescription for oxycodone to take as needed. In general, we expect that you should not be using the narcotic medication after the first week after surgery is over.  Oxycodone often contributes to constipation.  I recommend you stay hydrated and use stool softeners as directed.  It is common to develop occasional crampy pain and bloating after surgery.  You may use simethicone over-the-counter per package instructions as needed if desired for gas pain.  You may also have some shoulder pain, which can last a few days.    You will also be given instructions regarding restrictions at the time of discharge after the procedure.  Generally we limit lifting to 10-15 lbs for the first 6-8 weeks after the surgery.  This includes pushing and pulling (no pushing grocery carts or vacuuming).  I also advise that you abstain from intercourse for at least 6-8 weeks after surgery (nothing in the vagina).  We will determine when you can resume sexual activity after your postoperative exam.  You will be able to shower anytime.  I advise no bathing or swimming for at least a week after surgery.  Plan to avoid vigorous activity until I see you at your postoperative visit. Walking is advised.     You will be  given instructions regarding incisional care at the time of discharge.    Additional instructions will be provided upon discharge.                                    If you have labs or imaging done, the results will automatically release in [facility] without an interpretation.  Your health care professional will review those results and send an interpretation with recommendations as soon as possible, but this may be 1-3 business days.    If you have any questions regarding your visit, please contact your care team.     [facility] Access Services: [phone]  [facility] CLINIC HOURS TELEPHONE NUMBER       MD Brigette May - Certified Medical Assistant     Mingo RESTREPO RN  Olga-MEDARDO Rivera-MEDARDO Nash-  Brandie-     Monday- [place]  8:00 a.m - 5:00 p.m    Tuesday- Surgery        Thursday- [place]  8:00 a.m - 5:00 p.m.    Friday- [place]  7:30 a.m - 4:00 p.m. [facility]  [address]  [place] [address]  [phone] Fax  Imaging Scheduling [phone]    [facility]  [address] [place] [address] Dr.  [place], [place] [address] [phone]    [address] [address] [phone] Fax  Imaging Scheduling [phone]     Urgent Care locations:  [facility] Monday-Friday  10 am - 8 pm  Saturday and Sunday   9 am - 5 pm  Monday-Friday   10 am - 8 pm  Saturday and Sunday   9 am - 5 pm   [phone]     **Surgeries** Our Surgery Schedulers will contact you to schedule. If you do not receive a call within 3 business days, please call [phone].    If you need a medication refill, please contact your pharmacy. Please allow 3 business days for your refill to be completed.    As always, thank you for trusting us with your healthcare needs!

## 2023-09-22 NOTE — PROGRESS NOTES
OB/GYN New Consult      NAME:  Chayo Rehmanr  PC[facility]  [facility] [place]  MRN:  [id number]    Reason for Consult:  ABNORMAL UTERINE BLEEDING     Impression / Plan     48 year old  with:      ICD-10-CM    1. Abnormal uterine bleeding (AUB)  N93.9 Surgical Pathology Exam     ENDOMETRIAL BIOPSY W/O CERVICAL DILATION        Reviewed imagine personally and with the patient.  Reviewed labs and outside records including prior surgical notes and pathology. Discussed management options including Mirena IUD.  She is not interested in the IUD and strongly prefers surgical management.    Plan total laparoscopic hysterectomy, removal of tubal remnants.     The patient is aware that hysterectomy will render her sterile and unable to have further children.  We discussed the different routes of surgery including abdominal, vaginal, and laparoscopic and the possibility that the route of surgery may change during the procedure.  We discussed both total and supracervical hysterectomy and the benefits and contraindications involved.  We discussed ovarian sparing as well as oophrectomy.  We discussed benefits and risks of bilateral salpingectomy.   Reviewed pre and post op course. Patient was given the opportunity to ask questions and have them answered.   Details of the procedure were discussed with the patient.  Risks include, but are not limited to, bleeding, infection, and injury to surrounding organs such as the bowel, urinary system, nerves, and blood vessels.  Injury may result in repair at the time of the surgery or in a separate procedure.  All questions answered, and accepting these risks, the patient elects to proceed with the procedure.   She will be scheduled for preoperative clearance with her PCP.      She will reach out if she would like lysteda in the interim and we will weigh the risks and benefits.     Chief Complaint     Chief Complaint   Patient presents with    Abnormal Uterine Bleeding     Consult       HPI     Chayo Nathan is a  48 year old female who is seen for consult abnormal uterine bleeding. She saw Sasha Noble for preventative health exam in July and reported heavy periods.  She has also see Dr. Tripp at [facility]. She had a hysteroscopy dilation and curettage in 2017, benign polyp.     Patient's last menstrual period was 2023 (approximate).   Periods are regular, heavy, large baseball sized clots once per cycle, otherwise golf ball sized. Lasts 5-7 days.  The first three days are heavy enough she is not able to leave the house.  Uses both ultra tampons and overnight pad, which she changes every hour.     She did not tolerate progeterone only pills because of acne.        Patient has tried the following treatments: progeterone only pills in the past, but did not tolerate this.  Not interested in IUD.      Endometrial biopsy: done today  Obstetrical history is significant for:  CS x1 and  x2  Surgical history is significant for: CS for twins  She denies  personal and family history of breast, ovarian and colon cancers.  Uterine cancer in grandmother  Patient does not smoke.     There is no height or weight on file to calculate BMI.  Consent - today    No history of abnormal Paps     Contraception = tubal ligation    Problem List     Patient Active Problem List    Diagnosis Date Noted    Multiple sclerosis (H) 2019     Priority: Medium       Medications     Current Outpatient Medications   Medication    multivitamin w/minerals (THERA-VIT-M) tablet    ocrelizumab (OCREVUS) 300 MG/10ML SOLN injection    vitamin B-12 (CYANOCOBALAMIN) 2500 MCG sublingual tablet    vitamin C (ASCORBIC ACID) 1000 MG TABS    vitamin D3 (CHOLECALCIFEROL) 61113 units (250 mcg) capsule    Biotin 2500 MCG CAPS     Current Facility-Administered Medications   Medication    lidocaine 1% with EPINEPHrine 1:100,000 injection 3 mL        Allergies     Allergies   Allergen Reactions    Bees  Anaphylaxis    Ferumoxytol Difficulty breathing    Penicillin G Rash    Sulfa Antibiotics Rash       Past Medical/Surgical History     History reviewed. No pertinent past medical history.    Past Surgical History:   Procedure Laterality Date     SECTION      CHOLECYSTECTOMY      THYROIDECTOMY Right     TUBAL LIGATION          Social History     Social History     Socioeconomic History    Marital status:      Spouse name: Not on file    Number of children: Not on file    Years of education: Not on file    Highest education level: Not on file   Occupational History    Not on file   Tobacco Use    Smoking status: Never    Smokeless tobacco: Never   Vaping Use    Vaping Use: Never used   Substance and Sexual Activity    Alcohol use: Yes     Comment: socially    Drug use: Never    Sexual activity: Yes     Partners: Male     Birth control/protection: Female Surgical   Other Topics Concern    Not on file   Social History Narrative    Not on file     Social Determinants of Health     Financial Resource Strain: Not on file   Food Insecurity: Not on file   Transportation Needs: Not on file   Physical Activity: Not on file   Stress: Not on file   Social Connections: Not on file   Interpersonal Safety: Not on file   Housing Stability: Not on file       Family History      Family History   Problem Relation Age of Onset    Melanoma Father     Diabetes Maternal Grandfather     Melanoma Sister        ROS     Pertinent positives and negatives are listed in the HPI.     Physical Exam   Vitals: /77   LMP 2023 (Approximate)   BMI 37    General: Comfortable, no obvious distress   Psych: Alert. Appropriate affect,.  Normal speech.   : Normal female external genitalia.  No lesions.  Urethral meatus normal.  Speculum exam reveals a normal vaginal vault, normal cervix .  No lesions.  No abnormal discharge.  Bimanual exam reveals a normal, mobile, nontender uterus.  No cervical motion tenderness.  Adnexa  nontender with no palpable masses.         Labs/Imaging       I have personally reviewed the labs/imaging and findings were:    US 7/28/23:  Uterus 8.9 x 6.1 x 5.1 cm  Endometrial stripe 13 mm  Right ovary with hemorrhagic cyst, otherwise normal.  Normal left ovary.     TSH   Date Value Ref Range Status   07/21/2023 2.02 0.30 - 4.20 uIU/mL Final      Hemoglobin   Date Value Ref Range Status   06/16/2023 12.1 11.7 - 15.7 g/dL Final   07/21/2020 8.7 (L) 11.7 - 15.7 g/dL Final   ]       PROCEDURE: ENDOMETRIAL BIOPSY      After discussing the procedure and obtaining consent the patient was positioned in lithotomy and the cervix visualized with the speculum. The cervix was cleansed with betadine and the pipelle was inserted to  8  cm and small amount of tissue obtained. She tolerated this without difficulty.      Giuliana Zarate MD         Exacerbation of chronic problem, reviewed labs and US

## 2023-09-22 NOTE — TELEPHONE ENCOUNTER
[facility] SURGERY PLANNING/SCHEDULING WORKSHEET                                                     Chayo Nathan                :  1975  MRN:  [id number]  Home Phone [phone]   Work Phone Not on file.   Mobile [phone]         Surgeon: Giuliana Zarate MD    DIAGNOSIS:   abnormal uterine bleeding     SURGICAL PROCEDURE:  total laparoscopic hysterectomy, removal of tubal remnants.     Surgery Location:  [facility]  Patient Surgery Class:  SDS  Length of Procedure:  120 minutes  Type of anesthesia:  General    Multi-surgeon case: Armando, Annabelle, or Axel to assist  OR Assistant needed:   No  Vendor needed: No  Positioning:  Lithotomy  Laterality:  NA  Date requested:   as available     Special Equipment: None  Special Instructions for patient:  Per the [facility] Pre-Admission Nurse when they call the patient prior to the surgery date.   Precautions:  NONE  :  NOT NEEDED    Sterilization consent:  Yes and was signed on 23.    Preop: Pre-op options: PCP  Pre-surgery consult needed:  Not applicable.  Postop evaluation needed:  2 weeks and 6 weeks    ALLERGIES:   Allergies   Allergen Reactions    Bees Anaphylaxis    Ferumoxytol Difficulty breathing    Penicillin G Rash    Sulfa Antibiotics Rash      BMI:There is no height or weight on file to calculate BMI.      The proposed surgical procedure is considered INTERMEDIATE risk.      Giuliana Zarate MD    2023

## 2023-09-25 ENCOUNTER — TELEPHONE (OUTPATIENT)
Dept: OBGYN | Facility: OTHER | Age: 48
End: 2023-09-25
Payer: OTHER GOVERNMENT

## 2023-09-25 NOTE — TELEPHONE ENCOUNTER
[facility] SURGERY PLANNING/SCHEDULING WORKSHEET                                                     Chayo Nathan                :  1975  MRN:  [id number]  Home Phone [phone]   Work Phone Not on file.   Mobile [phone]         Surgeon: Giuliana Zarate MD     DIAGNOSIS:   abnormal uterine bleeding      SURGICAL PROCEDURE:  total laparoscopic hysterectomy, removal of tubal remnants.      Surgery Location:  [facility]  Patient Surgery Class:  SDS  Length of Procedure:  120 minutes  Type of anesthesia:  General     Multi-surgeon case: Armando, Annabelle, or Axel to assist  OR Assistant needed:   No  Vendor needed: No  Positioning:  Lithotomy  Laterality:  NA  Date requested:   as available      Special Equipment: None  Special Instructions for patient:  Per the [facility] Pre-Admission Nurse when they call the patient prior to the surgery date.   Precautions:  NONE  :  NOT NEEDED     Sterilization consent:  Yes and was signed on 23.     Preop: Pre-op options: PCP  Pre-surgery consult needed:  Not applicable.  Postop evaluation needed:  2 weeks and 6 weeks     ALLERGIES:        Allergies   Allergen Reactions    Bees Anaphylaxis    Ferumoxytol Difficulty breathing    Penicillin G Rash    Sulfa Antibiotics Rash      BMI:There is no height or weight on file to calculate BMI.       The proposed surgical procedure is considered INTERMEDIATE risk.        Giuliana Zarate MD    2023  SURGERY SCHEDULING AND PRECERTIFICATION    Medical Record Number: [id number]  Chayo Nathan  YOB: 1975   Phone: [phone] (home)   Primary Provider: [facility]    Reason for Admit:  ICD-10 CODE:  N93.9    Surgeon: Giuliana Zarate MD  Surgical Procedure: total laparoscopic hysterectomy, removal of tubal remnants.    Date of Surgery  Time of Surgery 7:30am  Surgery to be performed at:  [facility]  Status: Outpatient  Type of Anesthesia  Anticipated: General    Sterilization consent:  Yes and was signed on 09/22/23.    Pre-Op: On patient to schedule with PCP per her request  COVID testing:  Per Provider's discretion Covid testing is not indicated.     Post-Op:  2 weeks on 12/22 with Dr Zarate at [place]    Pre-certification routed to Financial Counselors:  Yes    Surgery packet mailed to patient's home address: Yes  Patient instructed NPO 12 hours prior to surgery, arrive 1.5 hours  prior to surgery, must have a .  Patient understood and agrees to the plan.      Requestor:  Jada Cortez     Location:  [facility] [phone]

## 2023-09-26 LAB
PATH REPORT.COMMENTS IMP SPEC: NORMAL
PATH REPORT.COMMENTS IMP SPEC: NORMAL
PATH REPORT.FINAL DX SPEC: NORMAL
PATH REPORT.GROSS SPEC: NORMAL
PATH REPORT.MICROSCOPIC SPEC OTHER STN: NORMAL
PATH REPORT.RELEVANT HX SPEC: NORMAL
PHOTO IMAGE: NORMAL

## 2023-10-04 ENCOUNTER — ANCILLARY PROCEDURE (OUTPATIENT)
Dept: MRI IMAGING | Facility: CLINIC | Age: 48
End: 2023-10-04
Attending: PSYCHIATRY & NEUROLOGY
Payer: OTHER GOVERNMENT

## 2023-10-04 DIAGNOSIS — G35 MS (MULTIPLE SCLEROSIS) (H): ICD-10-CM

## 2023-10-04 PROCEDURE — 72156 MRI NECK SPINE W/O & W/DYE: CPT | Performed by: RADIOLOGY

## 2023-10-04 PROCEDURE — 70553 MRI BRAIN STEM W/O & W/DYE: CPT | Performed by: RADIOLOGY

## 2023-10-04 PROCEDURE — A9585 GADOBUTROL INJECTION: HCPCS | Mod: JZ | Performed by: RADIOLOGY

## 2023-10-04 RX ORDER — GADOBUTROL 604.72 MG/ML
12 INJECTION INTRAVENOUS ONCE
Status: COMPLETED | OUTPATIENT
Start: 2023-10-04 | End: 2023-10-04

## 2023-10-04 RX ADMIN — GADOBUTROL 12 ML: 604.72 INJECTION INTRAVENOUS at 18:00

## 2023-10-25 NOTE — TELEPHONE ENCOUNTER
PB DOS: 12/8/23  Type of Procedure:  total laparoscopic hysterectomy, removal of tubal remnants.   CPT Codes: 02837  ICD10 Codes: N93.9  Surgeon/Ordering provider: Giuliana Zarate MD  Pre-cert/Authorization completed:  no PA required   Payer: Winifred Medina   Spoke to [facility] portal   Ref. # / Auth #   Valid Dates:     * Select Beneficiaries:     An approval from [facility] [employer] is not required for this service.  Select beneficiaries (this includes  Reserve Select,  Retired Reserve and  Select TAMP) who choose to use network providers may save on their cost-share.    Form faxed to [facility]     Please advise the patient to contact their insurance for coverage and benefits. Prior authorization is not a guarantee of payment. Payment is based on the patient's benefit plan documents.

## 2023-11-10 ENCOUNTER — ANCILLARY PROCEDURE (OUTPATIENT)
Dept: MAMMOGRAPHY | Facility: CLINIC | Age: 48
End: 2023-11-10
Payer: OTHER GOVERNMENT

## 2023-11-10 DIAGNOSIS — Z12.31 VISIT FOR SCREENING MAMMOGRAM: ICD-10-CM

## 2023-11-10 PROCEDURE — 77063 BREAST TOMOSYNTHESIS BI: CPT | Mod: GC | Performed by: RADIOLOGY

## 2023-11-10 PROCEDURE — 77067 SCR MAMMO BI INCL CAD: CPT | Mod: GC | Performed by: RADIOLOGY

## 2023-11-10 NOTE — TELEPHONE ENCOUNTER
FUTURE VISIT INFORMATION      SURGERY INFORMATION:  12/8 with Dr. Zarate @      RECORDS REQUESTED FROM:       Primary Care Provider: [facility]

## 2023-11-22 ENCOUNTER — PRE VISIT (OUTPATIENT)
Dept: SURGERY | Facility: CLINIC | Age: 48
End: 2023-11-22

## 2023-11-22 ENCOUNTER — MYC MEDICAL ADVICE (OUTPATIENT)
Dept: OBGYN | Facility: CLINIC | Age: 48
End: 2023-11-22

## 2023-11-22 NOTE — TELEPHONE ENCOUNTER
Pt sending [employer] giving permission to access care everywhere to get preop info.     Nicole MORRISON RN

## 2023-12-04 ENCOUNTER — TELEPHONE (OUTPATIENT)
Dept: OBGYN | Facility: OTHER | Age: 48
End: 2023-12-04
Payer: OTHER GOVERNMENT

## 2023-12-04 NOTE — TELEPHONE ENCOUNTER
Forms have been completed to the best of my ability and signed by Dr. Zarate. Both forms have been faxed to the corresponding fax number. A copy of the forms has been put in JUANY Machuca's basket.    Gissel Olivarez CMA 12/4/2023 11:08 AM

## 2023-12-09 ENCOUNTER — TELEPHONE (OUTPATIENT)
Dept: OBGYN | Facility: OTHER | Age: 48
End: 2023-12-09
Payer: OTHER GOVERNMENT

## 2023-12-09 NOTE — TELEPHONE ENCOUNTER
Called patient to see how she has been doing since surgery. She had a TLH and removal of tubal remnants with me yesterday at [facility].  She is doing well. Using tylenol for pain.  Tolerating general diet.  Minimal bleeding over night.  Voiding without concerns.  Passing flatus.    Procedure discussed with the patient.  I will contact her with the pathology results when they become available.   She will follow up with me on the 22nd, sooner as needed.  She will plan to have her infusion as scheduled the day prior on the 21st as long as she continues to do well.   Giuliana Zarate MD

## 2023-12-12 ENCOUNTER — OFFICE VISIT (OUTPATIENT)
Dept: FAMILY MEDICINE | Facility: CLINIC | Age: 48
End: 2023-12-12
Payer: OTHER GOVERNMENT

## 2023-12-12 ENCOUNTER — TELEPHONE (OUTPATIENT)
Dept: FAMILY MEDICINE | Facility: CLINIC | Age: 48
End: 2023-12-12

## 2023-12-12 VITALS
SYSTOLIC BLOOD PRESSURE: 117 MMHG | DIASTOLIC BLOOD PRESSURE: 83 MMHG | RESPIRATION RATE: 20 BRPM | WEIGHT: 256.3 LBS | HEIGHT: 67 IN | TEMPERATURE: 98 F | HEART RATE: 76 BPM | BODY MASS INDEX: 40.23 KG/M2 | OXYGEN SATURATION: 99 %

## 2023-12-12 DIAGNOSIS — E66.9 OBESITY WITH BODY MASS INDEX (BMI) OF 35.0 TO 39.9 WITHOUT COMORBIDITY: Primary | ICD-10-CM

## 2023-12-12 DIAGNOSIS — Z12.11 SCREEN FOR COLON CANCER: ICD-10-CM

## 2023-12-12 PROCEDURE — 99203 OFFICE O/P NEW LOW 30 MIN: CPT | Mod: 24 | Performed by: PHYSICIAN ASSISTANT

## 2023-12-12 RX ORDER — ISOTRETINOIN 40 MG/1
2 CAPSULE ORAL DAILY
COMMUNITY
Start: 2023-11-19 | End: 2024-05-24

## 2023-12-12 RX ORDER — PHENTERMINE HYDROCHLORIDE 15 MG/1
15 CAPSULE ORAL EVERY MORNING
Qty: 30 CAPSULE | Refills: 0 | Status: SHIPPED | OUTPATIENT
Start: 2023-12-12 | End: 2024-01-10 | Stop reason: DRUGHIGH

## 2023-12-12 ASSESSMENT — PAIN SCALES - GENERAL: PAINLEVEL: NO PAIN (0)

## 2023-12-12 NOTE — PATIENT INSTRUCTIONS
Start phentermine 15 mg   Let me know if you have side effects or other concerns  Schedule colonoscopy for colon cancer screening  Follow up with us in one month

## 2023-12-12 NOTE — PROGRESS NOTES
"  Assessment & Plan     Obesity with body mass index (BMI) of 35.0 to 39.9 without comorbidity  Normal TSH 4 1/2 months ago- struggles with weight  Start phentermine and follow up with me in one month  - phentermine (ADIPEX-P) 15 MG capsule  Dispense: 30 capsule; Refill: 0    Screen for colon cancer  Encouraged to schedule colonoscopy   - Colonoscopy Screening  Referral      Review of the result(s) of each unique test - tsh, lipids, cbc, vitamin D, A1C    Prescription drug management         BMI:   Estimated body mass index is 39.95 kg/m  as calculated from the following:    Height as of this encounter: 1.706 m (5' 7.17\").    Weight as of this encounter: 116.3 kg (256 lb 4.8 oz).   Weight management plan: Patient referred to endocrine and/or weight management specialty    Patient Instructions   Start phentermine 15 mg   Let me know if you have side effects or other concerns  Schedule colonoscopy for colon cancer screening  Follow up with us in one month     Shawna Garcia PA-C  [facility]    Talat Domingo is a 48 year old, presenting for the following health issues:  Consult (Establish Care, and discuss weightloss)      12/12/2023     8:16 AM   Additional Questions   Roomed by Chayo C   Accompanied by Self       History of Present Illness       Reason for visit:  Establish care    She eats 0-1 servings of fruits and vegetables daily.She consumes 0 sweetened beverage(s) daily.She exercises with enough effort to increase her heart rate 10 to 19 minutes per day.  She exercises with enough effort to increase her heart rate 4 days per week.   She is taking medications regularly.         Pt here to establish care - change of insurance, [facility].  Also wants to discuss weight loss.      Patient new to [facility] presents to establish care and for medication for obesity.    History of gastric bypass 2008 and had lost over 100lb.  Has " "tried weight watchers and keto.  Did well on keto couple years ago and lost 47 lb but couldn't maintain.  Scheduled appointment with Weight loss clinic but doesn't see them until April and wants to start something sooner.  Interested in ozempic or wegovy but insurance requires step approach to that - start with phentermine  Is an RN Care coordinator for oncology at   Recent Hysterectomy 4 day ago - doing well.  Reports recovery from Csection was worse   Has not had colonoscopy or colon cancer screening   History of MS on infusions and followed by neurology.  When infusions start to wear off gets clumsy and poor balance   Hysterectomy for heavy bleeding  Hormonal acne- will be stopping accutane in February   Sees  dermatology- once a year -history of basal cell on leg two years ago   Drinks alcohol twice a month   Review of Systems   Constitutional, HEENT, cardiovascular, pulmonary, gi and gu systems are negative, except as otherwise noted.      Objective    /83 (BP Location: Right arm, Patient Position: Sitting, Cuff Size: Adult Large)   Pulse 76   Temp 98  F (36.7  C) (Oral)   Resp 20   Ht 1.706 m (5' 7.17\")   Wt 116.3 kg (256 lb 4.8 oz)   SpO2 99%   BMI 39.95 kg/m    Body mass index is 39.95 kg/m .  Physical Exam   GENERAL: alert, no distress, and obese  NECK: no adenopathy, no asymmetry, masses, or scars and thyroid normal to palpation  RESP: lungs clear to auscultation - no rales, rhonchi or wheezes  CV: regular rate and rhythm, normal S1 S2, no S3 or S4, no murmur, click or rub, no peripheral edema and peripheral pulses strong  MS: no gross musculoskeletal defects noted, no edema  PSYCH: mentation appears normal, tearful, judgement and insight intact, and appearance well groomed                      "

## 2023-12-12 NOTE — TELEPHONE ENCOUNTER
"Prior Authorization Retail Medication Request    Medication/Dose: PHENTERMINE HCL 15MG CAPS  Diagnosis and ICD code (if different than what is on RX):     New/renewal/insurance change PA/secondary ins. PA:  Previously Tried and Failed:     Rationale:       Insurance   Primary: AYLEEN/MIKA   Insurance ID:  [id number]     Secondary (if applicable):   Insurance ID:       Pharmacy Information (if different than what is on RX)  Name:  [facility]  Phone:  [phone]  Fax:[phone]    A Prior Authorization has been started.  To submit the PA, please follow the instructions below:    Login to [email or web address] and \"enter a Key\"  KEY: [id number]    The patients plan does not cover the prescribed drug without a prior authorization. Please contact the plan at [phone] to initiate a PA or call/fax the pharmacy to change medication.  Patient ID # is [id number]        "

## 2023-12-14 NOTE — TELEPHONE ENCOUNTER
PA Initiation    Medication: PHENTERMINE HCL 15 MG PO CAPS  Insurance Company: Winifred - Phone [phone] Fax [phone]  Pharmacy Filling the Rx: [facility] #[id number] [place] [address]  Filling Pharmacy Phone: [phone]  Filling Pharmacy Fax: [phone]  Start Date: 12/14/2023

## 2023-12-18 NOTE — TELEPHONE ENCOUNTER
Prior Authorization Approval    Medication: PHENTERMINE HCL 15 MG PO CAPS  Authorization Effective Date: 11/14/2023  Authorization Expiration Date: 3/13/2024  Approved Dose/Quantity:   Reference #:     Insurance Company: [employer] [phone] Fax [phone]  Expected CoPay: $    CoPay Card Available:      Financial Assistance Needed:   Which Pharmacy is filling the prescription: [id number] [place] [address]  Pharmacy Notified: YES  Patient Notified: **Instructed pharmacy to notify patient when script is ready to /ship.**

## 2023-12-21 ENCOUNTER — INFUSION THERAPY VISIT (OUTPATIENT)
Dept: INFUSION THERAPY | Facility: CLINIC | Age: 48
End: 2023-12-21
Attending: NURSE PRACTITIONER
Payer: OTHER GOVERNMENT

## 2023-12-21 VITALS
OXYGEN SATURATION: 100 % | SYSTOLIC BLOOD PRESSURE: 125 MMHG | DIASTOLIC BLOOD PRESSURE: 80 MMHG | RESPIRATION RATE: 16 BRPM | TEMPERATURE: 98.1 F | HEART RATE: 75 BPM

## 2023-12-21 DIAGNOSIS — G35 MULTIPLE SCLEROSIS (H): Primary | ICD-10-CM

## 2023-12-21 PROCEDURE — 96375 TX/PRO/DX INJ NEW DRUG ADDON: CPT

## 2023-12-21 PROCEDURE — 258N000003 HC RX IP 258 OP 636: Performed by: PSYCHIATRY & NEUROLOGY

## 2023-12-21 PROCEDURE — 99207 PR NO CHARGE LOS: CPT

## 2023-12-21 PROCEDURE — 96365 THER/PROPH/DIAG IV INF INIT: CPT

## 2023-12-21 PROCEDURE — 250N000011 HC RX IP 250 OP 636: Mod: JZ | Performed by: PSYCHIATRY & NEUROLOGY

## 2023-12-21 PROCEDURE — 96366 THER/PROPH/DIAG IV INF ADDON: CPT

## 2023-12-21 PROCEDURE — 250N000013 HC RX MED GY IP 250 OP 250 PS 637: Performed by: PSYCHIATRY & NEUROLOGY

## 2023-12-21 RX ORDER — ACETAMINOPHEN 325 MG/1
650 TABLET ORAL ONCE
Status: COMPLETED | OUTPATIENT
Start: 2023-12-21 | End: 2023-12-21

## 2023-12-21 RX ORDER — MEPERIDINE HYDROCHLORIDE 25 MG/ML
25 INJECTION INTRAMUSCULAR; INTRAVENOUS; SUBCUTANEOUS EVERY 30 MIN PRN
Status: CANCELLED | OUTPATIENT
Start: 2023-12-21

## 2023-12-21 RX ORDER — METHYLPREDNISOLONE SODIUM SUCCINATE 125 MG/2ML
125 INJECTION, POWDER, LYOPHILIZED, FOR SOLUTION INTRAMUSCULAR; INTRAVENOUS ONCE
Status: COMPLETED | OUTPATIENT
Start: 2023-12-21 | End: 2023-12-21

## 2023-12-21 RX ORDER — DIPHENHYDRAMINE HCL 25 MG
50 CAPSULE ORAL ONCE
Status: COMPLETED | OUTPATIENT
Start: 2023-12-21 | End: 2023-12-21

## 2023-12-21 RX ORDER — METHYLPREDNISOLONE SODIUM SUCCINATE 125 MG/2ML
125 INJECTION, POWDER, LYOPHILIZED, FOR SOLUTION INTRAMUSCULAR; INTRAVENOUS
Status: CANCELLED
Start: 2023-12-21

## 2023-12-21 RX ORDER — ALBUTEROL SULFATE 0.83 MG/ML
2.5 SOLUTION RESPIRATORY (INHALATION)
Status: CANCELLED | OUTPATIENT
Start: 2023-12-21

## 2023-12-21 RX ORDER — METHYLPREDNISOLONE SODIUM SUCCINATE 125 MG/2ML
125 INJECTION, POWDER, LYOPHILIZED, FOR SOLUTION INTRAMUSCULAR; INTRAVENOUS ONCE
Status: CANCELLED | OUTPATIENT
Start: 2023-12-21

## 2023-12-21 RX ORDER — DIPHENHYDRAMINE HCL 25 MG
50 CAPSULE ORAL ONCE
Status: CANCELLED | OUTPATIENT
Start: 2023-12-21

## 2023-12-21 RX ORDER — ALBUTEROL SULFATE 90 UG/1
1-2 AEROSOL, METERED RESPIRATORY (INHALATION)
Status: CANCELLED
Start: 2023-12-21

## 2023-12-21 RX ORDER — DIPHENHYDRAMINE HYDROCHLORIDE 50 MG/ML
50 INJECTION INTRAMUSCULAR; INTRAVENOUS
Status: CANCELLED
Start: 2023-12-21

## 2023-12-21 RX ORDER — ACETAMINOPHEN 325 MG/1
650 TABLET ORAL ONCE
Status: CANCELLED | OUTPATIENT
Start: 2023-12-21

## 2023-12-21 RX ORDER — EPINEPHRINE 1 MG/ML
0.3 INJECTION, SOLUTION INTRAMUSCULAR; SUBCUTANEOUS EVERY 5 MIN PRN
Status: CANCELLED | OUTPATIENT
Start: 2023-12-21

## 2023-12-21 RX ADMIN — SODIUM CHLORIDE 250 ML: 9 INJECTION, SOLUTION INTRAVENOUS at 08:59

## 2023-12-21 RX ADMIN — METHYLPREDNISOLONE SODIUM SUCCINATE 125 MG: 125 INJECTION, POWDER, FOR SOLUTION INTRAMUSCULAR; INTRAVENOUS at 08:52

## 2023-12-21 RX ADMIN — ACETAMINOPHEN 650 MG: 325 TABLET ORAL at 08:51

## 2023-12-21 RX ADMIN — DIPHENHYDRAMINE HYDROCHLORIDE 50 MG: 25 CAPSULE ORAL at 08:50

## 2023-12-21 RX ADMIN — OCRELIZUMAB 600 MG: 300 INJECTION INTRAVENOUS at 09:01

## 2023-12-21 ASSESSMENT — PAIN SCALES - GENERAL: PAINLEVEL: NO PAIN (0)

## 2023-12-21 NOTE — PROGRESS NOTES
Infusion Nursing Note:  Chayo Nathan presents today for Ocrevus.    Patient seen by provider today: No   present during visit today: Not Applicable.    Note: Patient has a cold. She reports she has had a cough/cold since thanksgiving but over the last two days the cold has progressed and she's having congestion. Page sent to Dr. Love who said ok to proceed with today's infusion.     Intravenous Access:  Peripheral IV placed.    Treatment Conditions:  ~~~ NOTE: If the patient answers yes to any of the questions below, hold the infusion and contact ordering provider or on-call provider.    Do you currently have any signs of illness or infection or are you on any antibiotics? Yes - Congested /head cold  Have you recently had an elevated temperature, fever, chills, productive cough, coughing for 3 weeks or longer or hemoptysis, abnormal vital signs, night sweats, chest pain or have you noticed a decrease in your appetite, unexplained weight loss or fatigue? No  Have you had any new, sudden, or worsening abdominal pain? No  Do you have any open wounds or new incisions? (exclude for patients with hidradenitis suppurativa) Yes, hysterectomy 12/ 8 (timing ok per Dr. Love message 09/25)  Have you recently been diagnosed with any new nervous system diseases (ie. Multiple sclerosis, Guillain [place], seizures, neurological changes) or cancer diagnosis? Are you on any form of radiation or chemotherapy? No  Have there been any other new onset medical symptoms? No  Are you pregnant or breast feeding or do you have plans of pregnancy in the future?  N/A  Do you have any upcoming hospitalizations or surgeries? Does not include esophagogastroduodenoscopy, colonoscopy, endoscopic retrograde cholangiopancreatography (ERCP), endoscopic ultrasound (EUS), dental procedures (including cleanings, fillings, implants, extractions)  or joint aspiration/steroid injections No  Have you or anyone in your household  received a live vaccination in the past 4 weeks? Please note: No live vaccines while on biologic/chemotherapy until 6 months after the last treatment. Patient can receive the flu vaccine (shot only).  It is optimal for the patient to get it mid cycle, but it can be given at any time as long as it is not on the day of the infusion. No  If applicable to prescribed medication, confirm negative PPD or quantiferon gold MTB. If positive, verify has negative chest x-ray or the patient is at least 4 weeks post initiation of INH/B6 therapy and have clearance from provider before infusion N  If applicable to prescribed medication, confirm negative hepatitis B surface antigen or hepatitis C. If positive, clearance from provider before infusion. N  Rheumatology patients receiving tocilizumab (Actemra): If labs were drawn within the past week, hold dosing until cleared to infuse If AST/ALT > 2 X upper limit normal; ANC < 1.0.  N/A  Patients receiving belimumab (Benlysta): Have you been having any signs of worsening depression or suicidal ideations? N/A     Post Infusion Assessment:  Patient tolerated infusion without incident.  Patient observed for 60 minutes post Orencia per protocol.  Site patent and intact, free from redness, edema or discomfort.  No evidence of extravasations.  Access discontinued per protocol.  Biologic Infusion Post Education: Call the triage nurse at your clinic or seek medical attention if you have chills and/or temperature greater than or equal to 100.5, uncontrolled nausea/vomiting, diarrhea, constipation, dizziness, shortness of breath, chest pain, heart palpitations, weakness or any other new or concerning symptoms, questions or concerns.  You cannot have any live virus vaccines prior to or during treatment or up to 6 months post infusion.  If you have an upcoming surgery, medical procedure or dental procedure during treatment, this should be discussed with your ordering physician and your  surgeon/dentist.  If you are having any concerning symptom, if you are unsure if you should get your next infusion or wish to speak to a provider before your next infusion, please call your care coordinator or triage nurse at your clinic to notify them so we can adequately serve you.       Discharge Plan:   AVS to patient via [employer].  Patient will call for next appointment.   Patient discharged in stable condition accompanied by: self.  Departure Mode: Ambulatory.      Makenzie Mary RN

## 2023-12-22 ENCOUNTER — OFFICE VISIT (OUTPATIENT)
Dept: OBGYN | Facility: CLINIC | Age: 48
End: 2023-12-22
Payer: OTHER GOVERNMENT

## 2023-12-22 VITALS
WEIGHT: 258.4 LBS | SYSTOLIC BLOOD PRESSURE: 125 MMHG | HEART RATE: 97 BPM | DIASTOLIC BLOOD PRESSURE: 82 MMHG | BODY MASS INDEX: 40.27 KG/M2

## 2023-12-22 DIAGNOSIS — Z09 STATUS POST GYNECOLOGICAL SURGERY, FOLLOW-UP EXAM: Primary | ICD-10-CM

## 2023-12-22 PROCEDURE — 99024 POSTOP FOLLOW-UP VISIT: CPT | Performed by: OBSTETRICS & GYNECOLOGY

## 2023-12-22 NOTE — PROGRESS NOTES
Chayo Nathan presents today for her post operative check up.  She had a TLH and removal of tubal remnants for abnormal uterine bleeding.  She is doing well.  No concerns at this time.  She is eating and drinking well.  No trouble voiding on her own.  No vaginal bleeding.  No pain at this time.  Incisions are healing well.  By POD 2, she was feeling better than she did prior to the surgery.    Patient Active Problem List    Diagnosis Date Noted    Obesity with body mass index (BMI) of 35.0 to 39.9 without comorbidity 12/12/2023     Priority: Medium    Multiple sclerosis (H) 11/21/2019     Priority: Medium       REVIEW OF SYSTEMS  See HPI, otherwise 10 point ROS neg    Physical Exam:  Vitals:    12/22/23 0932   BP: 125/82   Pulse: 97   Weight: 117.2 kg (258 lb 6.4 oz)       GENERAL APPEARANCE: well nourished, well hydrated, no acute distress  ABDOMEN: soft, non-distended. Surgical site incisions intact and well healed.  Surrounding skin unremarkable      Pathology report and operative findings reviewed with the patient  Final Diagnosis   Uterus, cervix and bilateral fallopian tubes, hysterectomy with bilateral salpingectomy:  Endometrium: Secretory phase, negative for atypical hyperplasia or malignancy.  Myometrium: Adenomyosis.  Cervix: Focal microglandular hyperplasia, negative for dysplasia or malignancy.  Bilateral fallopian tubes:  Status post tubal ligation.  Fimbriated remnants, negative for atypia or malignancy   Operative Findings: Uterus with features suggestive of adenomyosis. Small benign appearing ovarian cyst on the left. Paratubal cysts. Otherwise normal tubes and ovaries bilaterally.     Post-op Impression:   Post op exam without complications    Recommendations:  Continue current restrictions and follow up in about 4 weeks, sooner as needed.    Giuliana Zarate MD

## 2024-01-10 ENCOUNTER — OFFICE VISIT (OUTPATIENT)
Dept: FAMILY MEDICINE | Facility: CLINIC | Age: 49
End: 2024-01-10
Payer: COMMERCIAL

## 2024-01-10 VITALS
RESPIRATION RATE: 16 BRPM | WEIGHT: 254.2 LBS | HEART RATE: 78 BPM | HEIGHT: 68 IN | OXYGEN SATURATION: 100 % | DIASTOLIC BLOOD PRESSURE: 84 MMHG | TEMPERATURE: 98 F | SYSTOLIC BLOOD PRESSURE: 125 MMHG | BODY MASS INDEX: 38.52 KG/M2

## 2024-01-10 DIAGNOSIS — E66.9 OBESITY WITH BODY MASS INDEX (BMI) OF 35.0 TO 39.9 WITHOUT COMORBIDITY: Primary | ICD-10-CM

## 2024-01-10 PROCEDURE — 99213 OFFICE O/P EST LOW 20 MIN: CPT | Mod: 24 | Performed by: PHYSICIAN ASSISTANT

## 2024-01-10 RX ORDER — PHENTERMINE HYDROCHLORIDE 37.5 MG/1
37.5 TABLET ORAL
Qty: 30 TABLET | Refills: 1 | Status: SHIPPED | OUTPATIENT
Start: 2024-01-10 | End: 2024-02-02

## 2024-01-10 ASSESSMENT — PAIN SCALES - GENERAL: PAINLEVEL: NO PAIN (0)

## 2024-01-10 NOTE — PATIENT INSTRUCTIONS
Check into coverage of ozempic if phentermine not helpful   Increase phentermine to 37.5 mg daily and follow up with us in one month

## 2024-01-10 NOTE — PROGRESS NOTES
Assessment & Plan     Obesity with body mass index (BMI) of 35.0 to 39.9 without comorbidity  Has had 4 pound weight loss last month but phentermine 15 mg seems to wear off. Will increase from 15 mg daily to 37.5 mg an follow up with me in one month video visit   No side effects.  Not raised blood pressure or pulse   - phentermine (ADIPEX-P) 37.5 MG tablet  Dispense: 30 tablet; Refill: 1      Prescription drug management         Patient Instructions   Check into coverage of ozempic if phentermine not helpful   Increase phentermine to 37.5 mg daily and follow up with us in one month    Shawna Garcia PA-C  [facility] DORA Domingo is a 48 year old, presenting for the following health issues:  Medication Follow-up (Phentermine 15 MG)        1/10/2024     8:40 AM   Additional Questions   Roomed by Jodie   Accompanied by self       History of Present Illness       Reason for visit:  Follow up weight    She eats 0-1 servings of fruits and vegetables daily.She consumes 0 sweetened beverage(s) daily.She exercises with enough effort to increase her heart rate 20 to 29 minutes per day.  She exercises with enough effort to increase her heart rate 3 or less days per week. She is missing 1 dose(s) of medications per week.         Medication Followup of phentermine 15 MG  Taking Medication as prescribed: yes  Side Effects:  None  Medication Helping Symptoms:  YES, somewhat       254 lbs 3.2 oz  Wt Readings from Last 4 Encounters:   01/10/24 115.3 kg (254 lb 3.2 oz)   12/22/23 117.2 kg (258 lb 6.4 oz)   12/12/23 116.3 kg (256 lb 4.8 oz)   07/21/23 116.5 kg (256 lb 12.8 oz)     Patient known to me with history of obesity and MS as well as iron deficiency anemia presents for follow up of obesity.  Takes phentermine 15 mg in AM and not hungry until noon and medication seems to wear off   Mid afternoon to evening hunger back.  Constantly thinking about eating  Not doing all the things she  "needs to do as far as diet..  got a Walking treadmill.  Works Half from home and half in person  With treadmill  gets 11853 steps in  Walk my dogs everyday 1/2 mile to 1 mile day  No side effects with med.  - no palpitations, no chest pain or shortness of breath. No insomnia  Does have an appointment with nutritionist beginning of feb   Reports preoccupied with food and also how I deal with stress  CSC- RN care coordinator for hematology/oncology  [facility]       Review of Systems   Constitutional, HEENT, cardiovascular, pulmonary, gi and gu systems are negative, except as otherwise noted.      Objective    /84 (BP Location: Right arm, Patient Position: Sitting, Cuff Size: Adult Large)   Pulse 78   Temp 98  F (36.7  C) (Oral)   Resp 16   Ht 1.727 m (5' 8\")   Wt 115.3 kg (254 lb 3.2 oz)   LMP 08/28/2023 (Approximate)   SpO2 100%   BMI 38.65 kg/m    Body mass index is 38.65 kg/m .  Physical Exam   GENERAL: alert, no distress, and obese  NECK: no adenopathy, no asymmetry, masses, or scars and thyroid normal to palpation  RESP: lungs clear to auscultation - no rales, rhonchi or wheezes  CV: regular rate and rhythm, normal S1 S2, no S3 or S4, no murmur, click or rub, no peripheral edema and peripheral pulses strong  MS: no gross musculoskeletal defects noted, no edema                      "

## 2024-01-11 ENCOUNTER — TELEPHONE (OUTPATIENT)
Dept: GASTROENTEROLOGY | Facility: CLINIC | Age: 49
End: 2024-01-11
Payer: COMMERCIAL

## 2024-01-11 NOTE — TELEPHONE ENCOUNTER
"Endoscopy Scheduling Screen    Have you had a positive Covid test in the last 14 days?  No    Are you active on MyChart?   Yes    What insurance is in the chart?  Other:  [employer]/    Ordering/Referring Provider: CHEVY COOLEY    (If ordering provider performs procedure, schedule with ordering provider unless otherwise instructed. )    BMI: Estimated body mass index is 38.65 kg/m  as calculated from the following:    Height as of 1/10/24: 1.727 m (5' 8\").    Weight as of 1/10/24: 115.3 kg (254 lb 3.2 oz).     Sedation Ordered  moderate sedation.   If patient BMI > 50 do not schedule in [facility].    If patient BMI > 45 do not schedule at [facility].    Are you taking methadone or Suboxone?  No    Are you taking any prescription medications for pain 3 or more times per week?   NO - No RN review required.    Do you have a history of malignant hyperthermia or adverse reaction to anesthesia?  No    (Females) Are you currently pregnant?   No     Have you been diagnosed or told you have pulmonary hypertension?   No    Do you have an LVAD?  No    Have you been told you have moderate to severe sleep apnea?  No    Have you been told you have COPD, asthma, or any other lung disease?  No    Do you have any heart conditions?  No     Have you ever had an organ transplant?   No    Have you ever had or are you awaiting a heart or lung transplant?   No    Have you had a stroke or transient ischemic attack (TIA aka \"mini stroke\" in the last 6 months?   No    Have you been diagnosed with or been told you have cirrhosis of the liver?   No    Are you currently on dialysis?   No    Do you need assistance transferring?   No    BMI: Estimated body mass index is 38.65 kg/m  as calculated from the following:    Height as of 1/10/24: 1.727 m (5' 8\").    Weight as of 1/10/24: 115.3 kg (254 lb 3.2 oz).     Is patients BMI > 40 and scheduling location [facility]?  No    Do you take an injectable medication for weight loss or diabetes (excluding " insulin)?  No    Do you take the medication Naltrexone?  No    Do you take blood thinners?  No       Prep   Are you currently on dialysis or do you have chronic kidney disease?  No    Do you have a diagnosis of diabetes?  No    Do you have a diagnosis of cystic fibrosis (CF)?  No    On a regular basis do you go 3 -5 days between bowel movements?  No    BMI > 40?  No    Preferred Pharmacy:    [id number] - [facility] [address] [address]  [address] [address] [address]  Phone: [phone] Fax: [phone]      Final Scheduling Details   Colonoscopy prep sent?  Standard MiraLAX    Procedure scheduled  Colonoscopy    Surgeon:  GERALDINE     Date of procedure:  04/05/2024     Pre-OP / PAC:   No - Not required for this site.    Location  [facility] - Per order.    Sedation   Moderate Sedation - Per order.      Patient Reminders:   You will receive a call from a Nurse to review instructions and health history.  This assessment must be completed prior to your procedure.  Failure to complete the Nurse assessment may result in the procedure being cancelled.      On the day of your procedure, please designate an adult(s) who can drive you home stay with you for the next 24 hours. The medicines used in the exam will make you sleepy. You will not be able to drive.      You cannot take public transportation, ride share services, or non-medical taxi service without a responsible caregiver.  Medical transport services are allowed with the requirement that a responsible caregiver will receive you at your destination.  We require that drivers and caregivers are confirmed prior to your procedure.

## 2024-01-15 ENCOUNTER — OFFICE VISIT (OUTPATIENT)
Dept: OBGYN | Facility: CLINIC | Age: 49
End: 2024-01-15
Payer: COMMERCIAL

## 2024-01-15 VITALS
SYSTOLIC BLOOD PRESSURE: 122 MMHG | HEIGHT: 68 IN | BODY MASS INDEX: 38.52 KG/M2 | DIASTOLIC BLOOD PRESSURE: 80 MMHG | WEIGHT: 254.2 LBS

## 2024-01-15 DIAGNOSIS — Z09 STATUS POST GYNECOLOGICAL SURGERY, FOLLOW-UP EXAM: Primary | ICD-10-CM

## 2024-01-15 PROCEDURE — 99024 POSTOP FOLLOW-UP VISIT: CPT | Performed by: OBSTETRICS & GYNECOLOGY

## 2024-01-15 NOTE — PROGRESS NOTES
"Chayo Nathan presents today for her post operative check up.   She had a TLH and removal of tubal remnants for abnormal uterine bleeding.      She is doing well.  No concerns at this time.  She is eating and drinking well.  No trouble voiding on her own.  No vaginal bleeding.  No pain at this time.  Incisions are healing well.    Patient Active Problem List    Diagnosis Date Noted    Obesity with body mass index (BMI) of 35.0 to 39.9 without comorbidity 12/12/2023     Priority: Medium    Multiple sclerosis (H) 11/21/2019     Priority: Medium       REVIEW OF SYSTEMS  See HPI, otherwise 10 point ROS neg    Physical Exam:  Vitals:    01/15/24 1140   BP: 122/80   Weight: 115.3 kg (254 lb 3.2 oz)   Height: 1.727 m (5' 8\")       GENERAL APPEARANCE: well nourished, well hydrated, no acute distress  ABDOMEN: soft, non-distended. Surgical site incisions intact and well healed.  Surrounding skin unremarkable    GENITOURINARY  EXTERNAL GENITALIA: normal, no lesions or discharge  URETHRAL MEATUS: no lesions or discharge, non-inflamed  VAGINA: Vaginal incision well healed. Vaginal cuff without induration, healing well, well supported  CERVIX: surgically absent  UTERUS: surgically absent  ADNEXA: nontender, no palpable masses  ANUS AND PERINEUM: no condylomata or other lesions      Pathology report and operative findings reviewed with the patient    Post-op Impression:   Post op exam without complications    Recommendations:  Return to normal activities  May resume sexual activity, precautions given.  May consider waiting another 1-2 weeks.  Return to PCP for ongoing care  Counseled on pelvic exams.  Paps no longer indicated.      Giuliana Zarate MD   "

## 2024-01-15 NOTE — PATIENT INSTRUCTIONS
If you have labs or imaging done, the results will automatically release in Lot18 without an interpretation.  Your health care professional will review those results and send an interpretation with recommendations as soon as possible, but this may be 1-3 business days.    If you have any questions regarding your visit, please contact your care team.     ethology Access Services: [phone]  [facility] CLINIC HOURS TELEPHONE NUMBER       MD Brigette May - Certified Medical Assistant     Kalee- LEAD RN  Olga-MEDARDO Rivera-MEDARDO Nash-  Brandie-     Monday- [place]  8:00 a.m - 5:00 p.m    Tuesday- Surgery        Thursday- [place]  8:00 a.m - 5:00 p.m.    Friday- [place]  7:30 a.m - 4:00 p.m. [facility]  [address]  [place], [place] [address] [phone] Fax  [phone] Phone  Imaging Scheduling [phone]    [facility] Delivery  [address] [place] [address] [address]  [place], [place] [address] [phone]    [facility]  [address] [place], [place] [id number] [phone] Phone  [phone] Fax  Imaging Scheduling [phone]     Urgent Care locations:  [facility] Monday-Friday  10 am - 8 pm  Saturday and Sunday   9 am - 5 pm  Monday-Friday   10 am - 8 pm  Saturday and Sunday   9 am - 5 pm   [phone]     **Surgeries** Our Surgery Schedulers will contact you to schedule. If you do not receive a call within 3 business days, please call [phone].    If you need a medication refill, please contact your pharmacy. Please allow 3 business days for your refill to be completed.    As always, thank you for trusting us with your healthcare needs!

## 2024-02-02 ENCOUNTER — VIRTUAL VISIT (OUTPATIENT)
Dept: ENDOCRINOLOGY | Facility: CLINIC | Age: 49
End: 2024-02-02
Payer: COMMERCIAL

## 2024-02-02 VITALS — HEIGHT: 68 IN | WEIGHT: 249.8 LBS | BODY MASS INDEX: 37.86 KG/M2

## 2024-02-02 DIAGNOSIS — E66.812 CLASS 2 SEVERE OBESITY WITH SERIOUS COMORBIDITY AND BODY MASS INDEX (BMI) OF 37.0 TO 37.9 IN ADULT, UNSPECIFIED OBESITY TYPE (H): Primary | ICD-10-CM

## 2024-02-02 DIAGNOSIS — Z01.419 WELL WOMAN EXAM WITH ROUTINE GYNECOLOGICAL EXAM: ICD-10-CM

## 2024-02-02 DIAGNOSIS — E66.01 CLASS 2 SEVERE OBESITY WITH SERIOUS COMORBIDITY AND BODY MASS INDEX (BMI) OF 37.0 TO 37.9 IN ADULT, UNSPECIFIED OBESITY TYPE (H): Primary | ICD-10-CM

## 2024-02-02 DIAGNOSIS — Z98.84 HISTORY OF GASTRIC BYPASS: ICD-10-CM

## 2024-02-02 DIAGNOSIS — E66.9 OBESITY WITH BODY MASS INDEX (BMI) OF 35.0 TO 39.9 WITHOUT COMORBIDITY: ICD-10-CM

## 2024-02-02 PROCEDURE — 99417 PROLNG OP E/M EACH 15 MIN: CPT | Mod: 24

## 2024-02-02 PROCEDURE — 99205 OFFICE O/P NEW HI 60 MIN: CPT | Mod: 24

## 2024-02-02 RX ORDER — TOPIRAMATE 25 MG/1
TABLET, FILM COATED ORAL
Qty: 90 TABLET | Refills: 1 | Status: SHIPPED | OUTPATIENT
Start: 2024-02-02 | End: 2024-04-16

## 2024-02-02 RX ORDER — PHENTERMINE HYDROCHLORIDE 37.5 MG/1
37.5 TABLET ORAL
Qty: 30 TABLET | Refills: 2 | Status: SHIPPED | OUTPATIENT
Start: 2024-02-02 | End: 2024-04-16

## 2024-02-02 ASSESSMENT — PAIN SCALES - GENERAL: PAINLEVEL: NO PAIN (0)

## 2024-02-02 NOTE — LETTER
"2024       RE: Chayo Nathan  [address] [facility] [address]     Dear Colleague,    Thank you for referring your patient, Chayo Nathan, to the [facility] [place] at [facility]. Please see a copy of my visit note below.    91 minutes spent by me on the date of the encounter doing chart review, history and exam, documentation and further activities per the note    New Medical Weight Management Consult    PATIENT:  Chayo Nathan  MRN:         [id number]  :         1975  VIOLET:         2024    Dear Shawna Garcia,    I had the pleasure of seeing your patient, Chayo Nathan. Full intake/assessment was done to determine barriers to weight loss success and develop a treatment plan. Chayo Nathan is a 49 year old female interested in treatment of medical problems associated with excess weight. She has a height of 5' 8\", a weight of 249 lbs 12.8 oz, and the calculated Body mass index is 37.98 kg/m .        Assessment & Plan  Problem List Items Addressed This Visit       Class 2 severe obesity with serious comorbidity and body mass index (BMI) of 37.0 to 37.9 in adult, unspecified obesity type (H) - Primary    Relevant Medications    phentermine (ADIPEX-P) 37.5 MG tablet    topiramate (TOPAMAX) 25 MG tablet    Other Relevant Orders    Parathyroid Hormone Intact    Vitamin A    Vitamin B12    Vitamin D Deficiency    Lipid panel reflex to direct LDL Fasting    CBC with platelets    Comprehensive metabolic panel    CBC with platelets    Adult Mental [facility]  Referral    History of gastric bypass    Relevant Medications    phentermine (ADIPEX-P) 37.5 MG tablet    Other Relevant Orders    Parathyroid Hormone Intact    Vitamin A    Vitamin B12    Vitamin D Deficiency    Lipid panel reflex to direct LDL Fasting    CBC with platelets    Comprehensive metabolic panel    CBC with platelets    Adult Mental " [facility] Referral    Obesity with body mass index (BMI) of 35.0 to 39.9 without comorbidity    Relevant Medications    phentermine (ADIPEX-P) 37.5 MG tablet     Other Visit Diagnoses       BMI 37.0-37.9, adult        Well woman exam with routine gynecological exam             RYGB in 2007     Overweight onset in early 20s with pregnancies. At age 19 was 150 lbs, gained up to 190 age 21. Had first child at age 25, second child age 26, twins at age 30. After first pregnancy gained up to 230, lost back to 190 , but with second and third pregnancies was not able to lose the weight. Gained up to 270 when pregnant with twins, after their birth was down to 250 lbs, but then gained up to highest weight in life of 303 lbs, had gastric bypass at age 32. After RYGB was able to lose down 185, maintained this for 10 years, but has gradually regained to 250. In 2021 was able to lose down to 223 with keto diet, but this was not sustainable and was not able to maintain.     Did well with gastric bypass , continues  to see improved portion control since this surgery.   Has been yo yoing between 230 and 250 for the last 2 years.     She was started on phentermine 1 month ago through primary care. She has noticed reduction in food noise from this, however feels that the effects wear off around 2-3pm every day, and struggles again with cravings and food noise into the night.     Comorbidities associated with weight gain include joint pain, fatigue.   Additional health issues include MS in 2018, managed by neurologist Dr. Love, is currently getting an infusion for this once every 6 months. Only current MS symptom she experiences is an occasional left foot drop. Additionally has delt with iron deficiency anemia over the last several years, this has been determined to be in part due to history of rygb and also heavy menstrual cycles. Had a hysterectomy last year and hopes this has resolved these issues, also takes  "multivitamin with iron.       Motivators for weight loss include improving self confidence, improve mobility. \"It (referring to her weight) consumes my entire life.\"      Regarding eating patterns and diet, she typically eats 2-3 meals a day. Craves sweet things, \"sugar is my kryptonite\" . IS able to get full. Can stay full. Eats out/ gets take out 2 times a week. Drinks 80oz of water daily, will also have 6 cans of diet pop a day, is open to cutting down on the pop. Drinks alcohol 2x a month socially.     Experiences constant food noise. Will snack out of boredom or stress. Endorses experiencing a loss of control around eating nearly every day, this is also associated with shame and distress. She is teary-eyed as she is describing these emotions around food, is open to speaking to a health psychologist to explore this further.     Breakfast: cheese stick or protein bar   Lunch: bowl of soup with crackers, popcorn, banana or apple  Snacks in car on way home from work: peanut butter crackers, candy  Dinner: Family dinners with 3 of her 4 kids who still live at home, Chayo cooks 4-5 nights a week. Teriyaki chicken, biscuits and gravy, pasta, chicken marlene with salad.   After dinner snack: cheese stick, pop corn  4-5 nights a week will wake up in the middle of the night to eat, typically due to stress. Will eat 1-3 servings of snacks depending on stress level: a handful of mnms, ice cream bar, cheese or crackers. Once she eats she's able to go back to bed. Has not seen reduction in these behaviors with starting the phentermine.        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 Is generally on the phone and computer for most of the day. Got a small treadmill to go under her desk at home, she works at home 1-3 days a week. Walks dogs daily. Is able to get on average 10,000 steps a day. Is not doing any other forms of exercise currently, in the past used to swim a lot. After her " RYGB she actually had done a couple of triathlons. Currently does not have access to a pool due to financial reasons. Struggles to run due to her MS currently, hasn't really enjoyed running in the past.         Past/ Current AOMs   Started phentermine December 2023 through PCP, seeing success with this, has not seen worsened issues with sleep. Is feeling the phentermine is successful in managing appetite up until 3:00pm when she sees a return in cravings. Blood pressure is well controlled since starting phentermine (122/80 in January 15, 2024).    Has taken wellbutrin in the distant past for depression while living in [place], is unsure if it helped with mood. Cannot recall if it impacted weight at all.         Plan  Start Topiramate 25mg - take 1 tablet at night for 7 days, then increase to 2 tablets at night for 7 days, then increase to 3 tablets at night.   Continue phentermine 37.5mg, refills provided   Alternatives to consider in the future: zepbound, contrave, metformin   Goals we discussed today: cutting down on daily pop   Referral placed for talking with health psychologist about evaluating her relationship between stress and eating   Labs ordered today: post rygb labs ordered   Follow up with Vanessa in 3 months   Dietician appointment next week        Anti-obesity medication started today for this patient   TOPIRAMATE  Will likely see synergistic benefit with phentermine, improved food noise control into the night   No history of kidney stones  No history of glaucoma   No issues with memory  No chronic kidney disease   On birth control s/p hysterectomy   . Side effects and risks associated with this medication, as well as medication administration instructions, were discussed. Patient expressed understanding and a willingness to proceed with starting this medication.        Potential anti-obesity medications for this patient the future if there are issues with cost or side effects  ZEPBOUND  Would likely  "see great relief with food noise and cravings with this med  No history of pancreatitis   No personal or family history of medullary thyroid carcinoma  No personal or family history of Multiple Endocrine Neoplasia Type 2  .    NALTREXONE  No history of liver disease  No chronic pain   No current opioid use   Could take at night to help with cravings   .  BUPROPION  Has taken in the past for mood, cannot recall if it was helpful for weight. No major side effects.   No history of bipolar disorder  No history of hypertension  No history of cardiovascular disease   No history of cardiac arrhythmias   No history of seizures  No history of bulimia nervosa  No history of anorexia nervosa  .  METFORMIN  No history of chronic kidney disease  GFR >45  Post menopausal, may see specific benefit for improved   .                Chayo Nathan is a 49 year old female who presents to clinic today for the following health issues.     She has the following co-morbidities:        2/1/2024     2:39 PM   --   I have the following health issues associated with obesity Stress Incontinence   I have the following symptoms associated with obesity Knee Pain    Lower Extremity Swelling    Fatigue    Groin Rash            No data to display                    2/1/2024     2:39 PM   Referring Provider   Please name the provider who referred you to Medical Weight Management  If you do not know, please answer \"I Don't Know\" Shawna Garcia             2/1/2024     2:39 PM   Weight History   How concerned are you about your weight? Very Concerned   I became overweight As a Teenager   The following factors have contributed to my weight gain Eating Wrong Types of Food    Eating Too Much    Genetic (Runs in the Family)    Stress   I have tried the following methods to lose weight Watching Portions or Calories    Exercise    Weight Watchers    Atkins-type Diet (Low Carb/High Protein)    Medications    Weight Loss Surgery    Meal Replacements    " Fasting   My lowest weight since age 19 was 185   My highest weight since age 19 was 303   The most weight I have ever lost was (lbs) 100   I have the following family history of obesity/being overweight My father is overweight    One or more of my siblings are overweight   How has your weight changed over the last year? Gained   How many pounds? 25           2/1/2024     2:39 PM   Diet Recall Review with Patient   If you do eat breakfast, what types of food do you eat? Cheese sticks, protein bar   If you do eat lunch, what types of food do you typically eat? Left overs from dinner   If you do eat supper, what types of food do you typically eat? Chicken, beef,   If you do snack, what types of food do you typically eat? Cheese and crackers, candy, protein bars   How many glasses of juice do you drink in a typical day? 0   How many of glasses of milk do you drink in a typical day? 0   How many 8oz glasses of sugar containing drinks such as [place]-A[employer]/sweet tea do you drink in a day? 0   How many cans/bottles of sugar pop/soda/tea/sports drinks do you drink in a day? 0   How many cans/bottles of diet pop/soda/tea or sports drink do you drink in a day? 6   How often do you have a drink of alcohol? 2-4 Times a Month   If you do drink, how many drinks might you have in a day? 1 or 2           2/1/2024     2:39 PM   Eating Habits   Generally, my meals include foods like these bread, pasta, rice, potatoes, corn, crackers, sweet dessert, pop, or juice Almost Everyday   Generally, my meals include foods like these fried meats, brats, burgers, french fries, pizza, cheese, chips, or ice cream A Few Times a Week   Eat fast food (like McDonalds, Burger Chauncey, Taco Bell) Once a Week   Eat at a buffet or sit-down restaurant Less Than Weekly   Eat most of my meals in front of the TV or computer Less Than Weekly   Often skip meals, eat at random times, have no regular eating times A Few Times a Week   Rarely sit down for a meal but  snack or graze throughout Almost Everyday   Eat extra snacks between meals Almost Everyday   Eat most of my food at the end of the day A Few Times a Week   Eat in the middle of the night or wake up at night to eat Less Than Weekly   Eat extra snacks to prevent or correct low blood sugar Less Than Weekly   Eat to prevent acid reflux or stomach pain Never   Worry about not having enough food to eat Never   I eat when I am depressed Never   I eat when I am stressed A Few Times a Week   I eat when I am bored A Few Times a Week   I eat when I am anxious A Few Times a Week   I eat when I am happy or as a reward A Few Times a Week   I feel hungry all the time even if I just have eaten Less Than Weekly   Feeling full is important to me Never   I finish all the food on my plate even if I am already full A Few Times a Week   I can't resist eating delicious food or walk past the good food/smell Less Than Weekly   I eat/snack without noticing that I am eating A Few Times a Week   I eat when I am preparing the meal Once a Week   I eat more than usual when I see others eating Once a Week   I have trouble not eating sweets, ice cream, cookies, or chips if they are around the house Everyday   I think about food all day Everyday   What foods, if any, do you crave? Sweets/Candy/Chocolate           2/1/2024     2:39 PM   Amount of Food   I feel out of control when eating Almost Everyday   I eat a large amount of food, like a loaf of bread, a box of cookies, a pint/quart of ice cream, all at once Never   I eat a large amount of food even when I am not hungry Never   I eat rapidly Never   I eat alone because I feel embarrassed and do not want others to see how much I have eaten Weekly   I eat until I am uncomfortably full Weekly   I feel bad, disgusted, or guilty after I overeat Almost Everyday   Endorses a sense of loss of control with eating almost every day.         2/1/2024     2:39 PM   Activity/Exercise History   How much of a  typical 12 hour day do you spend sitting? Half the Day   How much of a typical 12 hour day do you spend lying down? Less Than Half the Day   How much of a typical day do you spend walking/standing? Half the Day   How many hours (not including work) do you spend on the TV/Video Games/Computer/Tablet/Phone? 2-3 Hours   How many times a week are you active for the purpose of exercise? 2-3 Times a Week   What keeps you from being more active? Lack of Time   How many total minutes do you spend doing some activity for the purpose of exercising when you exercise? 15-30 Minutes       PAST MEDICAL HISTORY:  Past Medical History:   Diagnosis Date    History of basal cell carcinoma            2/1/2024     2:39 PM   Work/Social History Reviewed With Patient   My employment status is Full-Time   My job is RN   How much of your job is spent on the computer or phone? 75%   How many hours do you spend commuting to work daily? 1   What is your marital status? /In a Relationship   If in a relationship, is your significant other overweight? Yes   If you have children, are they overweight? Yes   Who do you live with?  and kids   Who does the food shopping? Me       Marijuana use: none   Alcohol use: socially 2x a month   Caffeine use: 6 cans diet coke/diet mountain/diet dr denton daily             2/1/2024     2:39 PM   Mental Health History Reviewed With Patient   Have you ever been physically or sexually abused? No   How often in the past 2 weeks have you felt little interest or pleasure in doing things? Not at all   Over the past 2 weeks how often have you felt down, depressed, or hopeless? Not at all             2/1/2024     2:39 PM   Sleep History Reviewed With Patient   How many hours do you sleep at night? 6         MEDICATIONS:   Current Outpatient Medications   Medication Sig Dispense Refill    AMNESTEEM 40 MG capsule Take 2 capsules by mouth daily      multivitamin w/minerals (THERA-VIT-M) tablet Take 1 tablet  "by mouth daily      ocrelizumab (OCREVUS) 300 MG/10ML SOLN injection Every six months      phentermine (ADIPEX-P) 37.5 MG tablet Take 1 tablet (37.5 mg) by mouth every morning (before breakfast) 30 tablet 2    topiramate (TOPAMAX) 25 MG tablet 25mg at bedtime for week 1, 50mg at bedtime for 1 week, and 75mg at bedtime thereafter 90 tablet 1    vitamin B-12 (CYANOCOBALAMIN) 2500 MCG sublingual tablet Take 2,500 mcg by mouth daily      vitamin C (ASCORBIC ACID) 1000 MG TABS Take 1,000 mg by mouth daily      vitamin D3 (CHOLECALCIFEROL) 89467 units (250 mcg) capsule Take one tablet every other day 15 capsule 11           ALLERGIES:   Allergies   Allergen Reactions    Bees Anaphylaxis    Ferumoxytol Difficulty breathing    Penicillin G Rash    Sulfa Antibiotics Rash            No data to display                        Objective   Ht 1.727 m (5' 8\")   Wt 113.3 kg (249 lb 12.8 oz)   LMP 08/28/2023 (Approximate)   BMI 37.98 kg/m    Vitals - Patient Reported  Pain Score: No Pain (0)      Vitals:  No vitals were obtained today due to virtual visit.    Physical Exam   GENERAL: alert and no distress  EYES: Eyes grossly normal to inspection.  No discharge or erythema, or obvious scleral/conjunctival abnormalities.  RESP: No audible wheeze, cough, or visible cyanosis.    SKIN: Visible skin clear. No significant rash, abnormal pigmentation or lesions.  NEURO: Cranial nerves grossly intact.  Mentation and speech appropriate for age.  PSYCH: Appropriate affect, tone, and pace of words     Anti-obesity medication ROS:    HEENT  Hx of glaucoma: No    Cardiovascular  CAD:No  HTN:No      Gastrointestinal  GERD:No  Constipation:Yes occasional, managed with senna as needed (a couple times a year)   Liver Dz:No  H/O Pancreatitis:No    Psychiatric  Bipolar: No  Anxiety:No  Depression:No Has taken wellbutrin in the past for depression while living in [place]   History of alcohol/drug abuse: No  Hx of eating " disorder:No    Endocrine  Personal or family hx of MTC or MEN2:No  Diabetes/prediabetes: No    Neurologic:  Hx of seizures: No  Hx of migraines: No  Memory Impairment: No  CVA history: No      History of kidney stones: Yes 1 kidney stone 14 years ago, none since   Kidney disease: No  Current birth control: Yes s/p hysterectomy   Interested in future pregnancy: No    Taking Opioid/Narcotic: No      Sincerely,    Vanessa Mcclendon PA-C     Chayo Nathan is a 49 year old who is being evaluated via a billable video visit.      How would you like to obtain your AVS? MyChart  If the video visit is dropped, the invitation should be resent by: Text to cell phone: [phone]  Will anyone else be joining your video visit? No    During this virtual visit the patient is located in [place], patient verifies this as the location during the entirety of this visit.

## 2024-02-02 NOTE — PROGRESS NOTES
Chayo Nathan is a 49 year old who is being evaluated via a billable video visit.      How would you like to obtain your AVS? MyChart  If the video visit is dropped, the invitation should be resent by: Text to cell phone: [phone]  Will anyone else be joining your video visit? No    During this virtual visit the patient is located in [place], patient verifies this as the location during the entirety of this visit.

## 2024-02-02 NOTE — PATIENT INSTRUCTIONS
"Thank you for allowing us the privilege of caring for you. We hope we provided you with the excellent service you deserve.   Please let us know if there is anything else we can do for you so that we can be sure you are completely satisfied with your care experience.    To ensure the quality of our services you may be receiving a patient satisfaction survey from an independent patient satisfaction monitoring company.    The greatest compliment you can give is a \"Likely to Recommend\"    Your visit was with Vanessa Mcclendon PA-C today.    Instructions per today's visit:     Dionisio Nathan, it was great to visit with you today.  Here is a review of our visit.  If our clinic scheduler is not able to reach you please call [phone] to schedule your next appointments.    Plan  Start Topiramate 25mg - take 1 tablet at night for 7 days, then increase to 2 tablets at night for 7 days, then increase to 3 tablets at night.   Continue phentermine 37.5mg, refills provided   Alternatives to consider in the future: zepbound, contrave, metformin   Goals we discussed today: cutting down on daily pop   Referral placed for talking with health psychologist about evaluating her relationship between stress and eating   Labs ordered today: post rygb labs ordered   Follow up with Vanessa in 3 months   Dietician appointment next week      Information about Video Visits with [facility]: video visit information  _________________________________________________________________________________________________________________________________________________________  If you are asked by your clinic team to have your blood pressure checked:  [facility] do offer several locations for blood pressure checks. Please follow the below link to schedule an appointment. Scheduling an appointment at the pharmacy for a blood pressure check is now preferred.    Appointment Plus " ([email or web address])  _________________________________________________________________________________________________________________________________________________________  Important contact and scheduling information:  Please call our contact center at [phone] to schedule your next appointments.  To find a lab location near you, please call [phone].  For any nursing questions or concerns call Hayley Zamora LPN at [phone] or Xiomy Neil RN at [phone]  Please call during clinic hours Monday through Friday 8:00a - 4:00p if you have questions or you can contact us via [employer] at anytime and we will reply during clinic hours.    Lab results will be communicated through My Chart or letter (if My Chart not used). Please call the clinic if you have not received communication after 1 week or if you have any questions.?  Clinic Fax: [phone]    _________________________________________________________________________________________________________________________________________________________  Meal Replacement Products:    Here is the link to our new e-store where you can purchase our meal replacement products    [facility] E-Store  [email or web address]    The one week starter kit is a great way to sample a variety of products and see what works for you.    If you want more information about the product go to: [employer]  [email or web address]    If you are an employee or [facility] or [facility] please contact your care team for a 10% estore discount    Free Shipping for orders over $75     Benefits of meal replacements products:    Portion and calorie control  Improved nutrition  Structured eating  Simplified food choices  Avoid contact with trigger foods  _________________________________________________________________________________________________________________________________________________________  Interested in working with a health  ?  Health coaches work with you to improve your overall health and wellbeing.  They look at the whole person, and may involve discussion of different areas of life, including, but not limited to the four pillars of health (sleep, exercise, nutrition, and stress management). Discuss with your care team if you would like to start working a health .  Health Coaching-3 Pack: Schedule by calling [phone]    $99 for three health coaching visits    Visits may be done in person or via phone    Coaching is a partnership between the  and the client; Coaches do not prescribe or diagnose    Coaching helps inspire the client to reach his/her personal goals   _________________________________________________________________________________________________________________________________________________________  24 Week Healthy Lifestyle Plan:    Our mission in the 24-week Healthy Lifestyle Plan is to provide you with individualized care by giving you the tools, education and support you need to lose weight and maintain a healthy lifestyle. In your 24-week journey, you ll be supported by a dedicated weight loss team that includes registered dietitians, medical weight management providers, health coaches, and nurses -- all with special expertise in weight loss -- to help you every step of the way.     Monthly meetings with your registered dietician or medical weight management provider help to review your progress, update your care plan, and make any adjustments needed to ensure success. Between these visits, weekly and bi-weekly health  visits will help you focus on the four pillars of weight loss -- stress, sleep, nutrition, and exercise -- and how you can best adapt each to achieve sustainable weight loss results.    In addition, you will be given exclusive access to online wellbeing classes through [employer].  Your initial visit will be with a medical weight management provider who will help to  understand your weight loss goals and ensure this program is the right fit for you. Please let our team know if you are interested in the 24 week plan by sending a message to your care team or calling [phone] to schedule.  _________________________________________________________________________________________________________________________________________________________  __________  [facility] Get Moving Program  Our team of physical therapists is trained to help you understand and take control of your condition. They will perform a thorough evaluation to determine your ability for activity and develop a customized plan to fit your goals and physical ability.  Scheduling: Unsure if the Get Moving program is right for you? Discuss the program with your medical provider or diabetes educator. You can also call us at [phone] to ask questions or schedule an appointment.   [facility] Get Moving Program  ____________________________________________________________________________________________________________________________________________________________________________  [facility] Diabetes Prevention Program (DPP)  If you have prediabetes and Medicare please contact us via [place] to learn more about the Diabetes Prevention Program (DPP)  Program Details:   [employer] [facility] offers the year-long Diabetes Prevention Program (DPP). The program helps you to make lifestyle changes that prevent or delay type 2 diabetes by supporting healthy eating, increased physical activity, stress reduction and use of coping skills.   On average, previous [facility] DPP cohorts have lost and maintained at least 5% of their starting weight throughout the program and averaged more than 150 minutes of physical activity per week.  Participants meet weekly for one-hour group sessions over sixteen weeks, every other week for the next 8 weeks, and monthly for the last six months.   A year-long  maintenance program is also available for participants who complete the first year.   Location & Cost:   During the COVID-19 Public Health Emergency, the program is offered virtually. When in-person classes can resume, they will be held at [facility].  For people with Medicare, the program is covered in full. A self-pay option will also be available for those with non-Medicare insurance plans.   ______________________________________________________________________________________________________________________________________________________________________________________________________________________________    To work with a Behavioral Health Psychologist:    Call to schedule:    Cain Arndt - [phone]  Mirta Bahena - [phone]  Siria Valdez - [phone]  Eduarda Hairston - [phone]   Clarisse Jackson PhD (cannot accept Medicare) [phone]        Thank you,   [facility] Comprehensive Weight Management Team

## 2024-02-02 NOTE — PROGRESS NOTES
"91 minutes spent by me on the date of the encounter doing chart review, history and exam, documentation and further activities per the note    New Medical Weight Management Consult    PATIENT:  Chayo Nathan  MRN:         [id number]  :         1975  VIOLET:         2024    Dear Shawna Garcia,    I had the pleasure of seeing your patient, Chayo Nathan. Full intake/assessment was done to determine barriers to weight loss success and develop a treatment plan. Chayo Nathan is a 49 year old female interested in treatment of medical problems associated with excess weight. She has a height of 5' 8\", a weight of 249 lbs 12.8 oz, and the calculated Body mass index is 37.98 kg/m .        Assessment & Plan   Problem List Items Addressed This Visit       Class 2 severe obesity with serious comorbidity and body mass index (BMI) of 37.0 to 37.9 in adult, unspecified obesity type (H) - Primary    Relevant Medications    phentermine (ADIPEX-P) 37.5 MG tablet    topiramate (TOPAMAX) 25 MG tablet    Other Relevant Orders    Parathyroid Hormone Intact    Vitamin A    Vitamin B12    Vitamin D Deficiency    Lipid panel reflex to direct LDL Fasting    CBC with platelets    Comprehensive metabolic panel    CBC with platelets    Adult [facility]  Referral    History of gastric bypass    Relevant Medications    phentermine (ADIPEX-P) 37.5 MG tablet    Other Relevant Orders    Parathyroid Hormone Intact    Vitamin A    Vitamin B12    Vitamin D Deficiency    Lipid panel reflex to direct LDL Fasting    CBC with platelets    Comprehensive metabolic panel    CBC with platelets    [facility]  Referral    Obesity with body mass index (BMI) of 35.0 to 39.9 without comorbidity    Relevant Medications    phentermine (ADIPEX-P) 37.5 MG tablet     Other Visit Diagnoses       BMI 37.0-37.9, adult        Well woman exam with routine gynecological exam             RYGB in      Overweight onset in early " "20s with pregnancies. At age 19 was 150 lbs, gained up to 190 age 21. Had first child at age 25, second child age 26, twins at age 30. After first pregnancy gained up to 230, lost back to 190 , but with second and third pregnancies was not able to lose the weight. Gained up to 270 when pregnant with twins, after their birth was down to 250 lbs, but then gained up to highest weight in life of 303 lbs, had gastric bypass at age 32. After RYGB was able to lose down 185, maintained this for 10 years, but has gradually regained to 250. In 2021 was able to lose down to 223 with keto diet, but this was not sustainable and was not able to maintain.     Did well with gastric bypass , continues  to see improved portion control since this surgery.   Has been yo yoing between 230 and 250 for the last 2 years.     She was started on phentermine 1 month ago through primary care. She has noticed reduction in food noise from this, however feels that the effects wear off around 2-3pm every day, and struggles again with cravings and food noise into the night.     Comorbidities associated with weight gain include joint pain, fatigue.   Additional health issues include MS in 2018, managed by neurologist Dr. Love, is currently getting an infusion for this once every 6 months. Only current MS symptom she experiences is an occasional left foot drop. Additionally has delt with iron deficiency anemia over the last several years, this has been determined to be in part due to history of rygb and also heavy menstrual cycles. Had a hysterectomy last year and hopes this has resolved these issues, also takes multivitamin with iron.       Motivators for weight loss include improving self confidence, improve mobility. \"It (referring to her weight) consumes my entire life.\"      Regarding eating patterns and diet, she typically eats 2-3 meals a day. Craves sweet things, \"sugar is my kryptonite\" . IS able to get full. Can stay full. Eats out/ " gets take out 2 times a week. Drinks 80oz of water daily, will also have 6 cans of diet pop a day, is open to cutting down on the pop. Drinks alcohol 2x a month socially.     Experiences constant food noise. Will snack out of boredom or stress. Endorses experiencing a loss of control around eating nearly every day, this is also associated with shame and distress. She is teary-eyed as she is describing these emotions around food, is open to speaking to a health psychologist to explore this further.     Breakfast: cheese stick or protein bar   Lunch: bowl of soup with crackers, popcorn, banana or apple  Snacks in car on way home from work: peanut butter crackers, candy  Dinner: Family dinners with 3 of her 4 kids who still live at home, Chayo cooks 4-5 nights a week. Teriyaki chicken, biscuits and gravy, pasta, chicken marlene with salad.   After dinner snack: cheese stick, pop corn  4-5 nights a week will wake up in the middle of the night to eat, typically due to stress. Will eat 1-3 servings of snacks depending on stress level: a handful of mnms, ice cream bar, cheese or crackers. Once she eats she's able to go back to bed. Has not seen reduction in these behaviors with starting the phentermine.        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 Is generally on the phone and computer for most of the day. Got a small treadmill to go under her desk at home, she works at home 1-3 days a week. Walks dogs daily. Is able to get on average 10,000 steps a day. Is not doing any other forms of exercise currently, in the past used to swim a lot. After her RYGB she actually had done a couple of triathlons. Currently does not have access to a pool due to financial reasons. Struggles to run due to her MS currently, hasn't really enjoyed running in the past.         Past/ Current AOMs   Started phentermine December 2023 through PCP, seeing success with this, has not seen worsened issues  with sleep. Is feeling the phentermine is successful in managing appetite up until 3:00pm when she sees a return in cravings. Blood pressure is well controlled since starting phentermine (122/80 in January 15, 2024).    Has taken wellbutrin in the distant past for depression while living in [place], is unsure if it helped with mood. Cannot recall if it impacted weight at all.         Plan  Start Topiramate 25mg - take 1 tablet at night for 7 days, then increase to 2 tablets at night for 7 days, then increase to 3 tablets at night.   Continue phentermine 37.5mg, refills provided   Alternatives to consider in the future: zepbound, contrave, metformin   Goals we discussed today: cutting down on daily pop   Referral placed for talking with health psychologist about evaluating her relationship between stress and eating   Labs ordered today: post rygb labs ordered   Follow up with Vanessa in 3 months   Dietician appointment next week        Anti-obesity medication started today for this patient   TOPIRAMATE  Will likely see synergistic benefit with phentermine, improved food noise control into the night   No history of kidney stones  No history of glaucoma   No issues with memory  No chronic kidney disease   On birth control s/p hysterectomy   . Side effects and risks associated with this medication, as well as medication administration instructions, were discussed. Patient expressed understanding and a willingness to proceed with starting this medication.        Potential anti-obesity medications for this patient the future if there are issues with cost or side effects  ZEPBOUND  Would likely see great relief with food noise and cravings with this med  No history of pancreatitis   No personal or family history of medullary thyroid carcinoma  No personal or family history of Multiple Endocrine Neoplasia Type 2  .    NALTREXONE  No history of liver disease  No chronic pain   No current opioid use   Could take at night to  "help with cravings   .  BUPROPION  Has taken in the past for mood, cannot recall if it was helpful for weight. No major side effects.   No history of bipolar disorder  No history of hypertension  No history of cardiovascular disease   No history of cardiac arrhythmias   No history of seizures  No history of bulimia nervosa  No history of anorexia nervosa  .  METFORMIN  No history of chronic kidney disease  GFR >45  Post menopausal, may see specific benefit for improved   .                Chayo Nathan is a 49 year old female who presents to clinic today for the following health issues.     She has the following co-morbidities:        2/1/2024     2:39 PM   --   I have the following health issues associated with obesity Stress Incontinence   I have the following symptoms associated with obesity Knee Pain    Lower Extremity Swelling    Fatigue    Groin Rash            No data to display                    2/1/2024     2:39 PM   Referring Provider   Please name the provider who referred you to Medical Weight Management  If you do not know, please answer \"I Don't Know\" Shawna Garcia             2/1/2024     2:39 PM   Weight History   How concerned are you about your weight? Very Concerned   I became overweight As a Teenager   The following factors have contributed to my weight gain Eating Wrong Types of Food    Eating Too Much    Genetic (Runs in the Family)    Stress   I have tried the following methods to lose weight Watching Portions or Calories    Exercise    Weight Watchers    Atkins-type Diet (Low Carb/High Protein)    Medications    Weight Loss Surgery    Meal Replacements    Fasting   My lowest weight since age 19 was 185   My highest weight since age 19 was 303   The most weight I have ever lost was (lbs) 100   I have the following family history of obesity/being overweight My father is overweight    One or more of my siblings are overweight   How has your weight changed over the last year? Gained   How " many pounds? 25           2/1/2024     2:39 PM   Diet Recall Review with Patient   If you do eat breakfast, what types of food do you eat? Cheese sticks, protein bar   If you do eat lunch, what types of food do you typically eat? Left overs from dinner   If you do eat supper, what types of food do you typically eat? Chicken, beef,   If you do snack, what types of food do you typically eat? Cheese and crackers, candy, protein bars   How many glasses of juice do you drink in a typical day? 0   How many of glasses of milk do you drink in a typical day? 0   How many 8oz glasses of sugar containing drinks such as [place]-A[employer]/sweet tea do you drink in a day? 0   How many cans/bottles of sugar pop/soda/tea/sports drinks do you drink in a day? 0   How many cans/bottles of diet pop/soda/tea or sports drink do you drink in a day? 6   How often do you have a drink of alcohol? 2-4 Times a Month   If you do drink, how many drinks might you have in a day? 1 or 2           2/1/2024     2:39 PM   Eating Habits   Generally, my meals include foods like these bread, pasta, rice, potatoes, corn, crackers, sweet dessert, pop, or juice Almost Everyday   Generally, my meals include foods like these fried meats, brats, burgers, french fries, pizza, cheese, chips, or ice cream A Few Times a Week   Eat fast food (like McDonalds, Burger Chauncey, Taco Bell) Once a Week   Eat at a buffet or sit-down restaurant Less Than Weekly   Eat most of my meals in front of the TV or computer Less Than Weekly   Often skip meals, eat at random times, have no regular eating times A Few Times a Week   Rarely sit down for a meal but snack or graze throughout Almost Everyday   Eat extra snacks between meals Almost Everyday   Eat most of my food at the end of the day A Few Times a Week   Eat in the middle of the night or wake up at night to eat Less Than Weekly   Eat extra snacks to prevent or correct low blood sugar Less Than Weekly   Eat to prevent acid reflux or  stomach pain Never   Worry about not having enough food to eat Never   I eat when I am depressed Never   I eat when I am stressed A Few Times a Week   I eat when I am bored A Few Times a Week   I eat when I am anxious A Few Times a Week   I eat when I am happy or as a reward A Few Times a Week   I feel hungry all the time even if I just have eaten Less Than Weekly   Feeling full is important to me Never   I finish all the food on my plate even if I am already full A Few Times a Week   I can't resist eating delicious food or walk past the good food/smell Less Than Weekly   I eat/snack without noticing that I am eating A Few Times a Week   I eat when I am preparing the meal Once a Week   I eat more than usual when I see others eating Once a Week   I have trouble not eating sweets, ice cream, cookies, or chips if they are around the house Everyday   I think about food all day Everyday   What foods, if any, do you crave? Sweets/Candy/Chocolate           2/1/2024     2:39 PM   Amount of Food   I feel out of control when eating Almost Everyday   I eat a large amount of food, like a loaf of bread, a box of cookies, a pint/quart of ice cream, all at once Never   I eat a large amount of food even when I am not hungry Never   I eat rapidly Never   I eat alone because I feel embarrassed and do not want others to see how much I have eaten Weekly   I eat until I am uncomfortably full Weekly   I feel bad, disgusted, or guilty after I overeat Almost Everyday   Endorses a sense of loss of control with eating almost every day.         2/1/2024     2:39 PM   Activity/Exercise History   How much of a typical 12 hour day do you spend sitting? Half the Day   How much of a typical 12 hour day do you spend lying down? Less Than Half the Day   How much of a typical day do you spend walking/standing? Half the Day   How many hours (not including work) do you spend on the TV/Video Games/Computer/Tablet/Phone? 2-3 Hours   How many times a week  are you active for the purpose of exercise? 2-3 Times a Week   What keeps you from being more active? Lack of Time   How many total minutes do you spend doing some activity for the purpose of exercising when you exercise? 15-30 Minutes       PAST MEDICAL HISTORY:  Past Medical History:   Diagnosis Date    History of basal cell carcinoma            2/1/2024     2:39 PM   Work/Social History Reviewed With Patient   My employment status is Full-Time   My job is RN   How much of your job is spent on the computer or phone? 75%   How many hours do you spend commuting to work daily? 1   What is your marital status? /In a Relationship   If in a relationship, is your significant other overweight? Yes   If you have children, are they overweight? Yes   Who do you live with?  and kids   Who does the food shopping? Me       Marijuana use: none   Alcohol use: socially 2x a month   Caffeine use: 6 cans diet coke/diet mountain/diet dr denton daily             2/1/2024     2:39 PM   Mental Health History Reviewed With Patient   Have you ever been physically or sexually abused? No   How often in the past 2 weeks have you felt little interest or pleasure in doing things? Not at all   Over the past 2 weeks how often have you felt down, depressed, or hopeless? Not at all             2/1/2024     2:39 PM   Sleep History Reviewed With Patient   How many hours do you sleep at night? 6         MEDICATIONS:   Current Outpatient Medications   Medication Sig Dispense Refill    AMNESTEEM 40 MG capsule Take 2 capsules by mouth daily      multivitamin w/minerals (THERA-VIT-M) tablet Take 1 tablet by mouth daily      ocrelizumab (OCREVUS) 300 MG/10ML SOLN injection Every six months      phentermine (ADIPEX-P) 37.5 MG tablet Take 1 tablet (37.5 mg) by mouth every morning (before breakfast) 30 tablet 2    topiramate (TOPAMAX) 25 MG tablet 25mg at bedtime for week 1, 50mg at bedtime for 1 week, and 75mg at bedtime thereafter 90 tablet 1  "   vitamin B-12 (CYANOCOBALAMIN) 2500 MCG sublingual tablet Take 2,500 mcg by mouth daily      vitamin C (ASCORBIC ACID) 1000 MG TABS Take 1,000 mg by mouth daily      vitamin D3 (CHOLECALCIFEROL) 88446 units (250 mcg) capsule Take one tablet every other day 15 capsule 11           ALLERGIES:   Allergies   Allergen Reactions    Bees Anaphylaxis    Ferumoxytol Difficulty breathing    Penicillin G Rash    Sulfa Antibiotics Rash            No data to display                        Objective    Ht 1.727 m (5' 8\")   Wt 113.3 kg (249 lb 12.8 oz)   LMP 08/28/2023 (Approximate)   BMI 37.98 kg/m    Vitals - Patient Reported  Pain Score: No Pain (0)      Vitals:  No vitals were obtained today due to virtual visit.    Physical Exam   GENERAL: alert and no distress  EYES: Eyes grossly normal to inspection.  No discharge or erythema, or obvious scleral/conjunctival abnormalities.  RESP: No audible wheeze, cough, or visible cyanosis.    SKIN: Visible skin clear. No significant rash, abnormal pigmentation or lesions.  NEURO: Cranial nerves grossly intact.  Mentation and speech appropriate for age.  PSYCH: Appropriate affect, tone, and pace of words     Anti-obesity medication ROS:    HEENT  Hx of glaucoma: No    Cardiovascular  CAD:No  HTN:No      Gastrointestinal  GERD:No  Constipation:Yes occasional, managed with senna as needed (a couple times a year)   Liver Dz:No  H/O Pancreatitis:No    Psychiatric  Bipolar: No  Anxiety:No  Depression:No Has taken wellbutrin in the past for depression while living in [place]   History of alcohol/drug abuse: No  Hx of eating disorder:No    Endocrine  Personal or family hx of MTC or MEN2:No  Diabetes/prediabetes: No    Neurologic:  Hx of seizures: No  Hx of migraines: No  Memory Impairment: No  CVA history: No      History of kidney stones: Yes 1 kidney stone 14 years ago, none since   Kidney disease: No  Current birth control: Yes s/p hysterectomy   Interested in future pregnancy: " No    Taking Opioid/Narcotic: No        Sincerely,    Vanessa Mcclendon PA-C

## 2024-02-05 ENCOUNTER — LAB (OUTPATIENT)
Dept: LAB | Facility: CLINIC | Age: 49
End: 2024-02-05
Payer: COMMERCIAL

## 2024-02-05 DIAGNOSIS — E66.812 CLASS 2 SEVERE OBESITY WITH SERIOUS COMORBIDITY AND BODY MASS INDEX (BMI) OF 37.0 TO 37.9 IN ADULT, UNSPECIFIED OBESITY TYPE (H): ICD-10-CM

## 2024-02-05 DIAGNOSIS — E66.01 CLASS 2 SEVERE OBESITY WITH SERIOUS COMORBIDITY AND BODY MASS INDEX (BMI) OF 37.0 TO 37.9 IN ADULT, UNSPECIFIED OBESITY TYPE (H): ICD-10-CM

## 2024-02-05 DIAGNOSIS — Z98.84 HISTORY OF GASTRIC BYPASS: ICD-10-CM

## 2024-02-05 LAB
ALBUMIN SERPL BCG-MCNC: 4.4 G/DL (ref 3.5–5.2)
ALP SERPL-CCNC: 103 U/L (ref 40–150)
ALT SERPL W P-5'-P-CCNC: 15 U/L (ref 0–50)
ANION GAP SERPL CALCULATED.3IONS-SCNC: 10 MMOL/L (ref 7–15)
AST SERPL W P-5'-P-CCNC: 24 U/L (ref 0–45)
BILIRUB SERPL-MCNC: 0.3 MG/DL
BUN SERPL-MCNC: 10.7 MG/DL (ref 6–20)
CALCIUM SERPL-MCNC: 9.5 MG/DL (ref 8.6–10)
CHLORIDE SERPL-SCNC: 104 MMOL/L (ref 98–107)
CHOLEST SERPL-MCNC: 217 MG/DL
CREAT SERPL-MCNC: 0.77 MG/DL (ref 0.51–0.95)
DEPRECATED HCO3 PLAS-SCNC: 24 MMOL/L (ref 22–29)
EGFRCR SERPLBLD CKD-EPI 2021: >90 ML/MIN/1.73M2
ERYTHROCYTE [DISTWIDTH] IN BLOOD BY AUTOMATED COUNT: 16.9 % (ref 10–15)
FASTING STATUS PATIENT QL REPORTED: NO
GLUCOSE SERPL-MCNC: 94 MG/DL (ref 70–99)
HCT VFR BLD AUTO: 37.6 % (ref 35–47)
HDLC SERPL-MCNC: 59 MG/DL
HGB BLD-MCNC: 11.5 G/DL (ref 11.7–15.7)
LDLC SERPL CALC-MCNC: 133 MG/DL
MCH RBC QN AUTO: 21.5 PG (ref 26.5–33)
MCHC RBC AUTO-ENTMCNC: 30.6 G/DL (ref 31.5–36.5)
MCV RBC AUTO: 70 FL (ref 78–100)
NONHDLC SERPL-MCNC: 158 MG/DL
PLATELET # BLD AUTO: 420 10E3/UL (ref 150–450)
POTASSIUM SERPL-SCNC: 4 MMOL/L (ref 3.4–5.3)
PROT SERPL-MCNC: 7.4 G/DL (ref 6.4–8.3)
PTH-INTACT SERPL-MCNC: 42 PG/ML (ref 15–65)
RBC # BLD AUTO: 5.36 10E6/UL (ref 3.8–5.2)
SODIUM SERPL-SCNC: 138 MMOL/L (ref 135–145)
TRIGL SERPL-MCNC: 123 MG/DL
VIT B12 SERPL-MCNC: 3962 PG/ML (ref 232–1245)
VIT D+METAB SERPL-MCNC: 54 NG/ML (ref 20–50)
WBC # BLD AUTO: 5.8 10E3/UL (ref 4–11)

## 2024-02-05 PROCEDURE — 83970 ASSAY OF PARATHORMONE: CPT | Performed by: PATHOLOGY

## 2024-02-05 PROCEDURE — 99000 SPECIMEN HANDLING OFFICE-LAB: CPT | Performed by: PATHOLOGY

## 2024-02-05 PROCEDURE — 82306 VITAMIN D 25 HYDROXY: CPT

## 2024-02-05 PROCEDURE — 80061 LIPID PANEL: CPT | Performed by: PATHOLOGY

## 2024-02-05 PROCEDURE — 82607 VITAMIN B-12: CPT

## 2024-02-05 PROCEDURE — 84590 ASSAY OF VITAMIN A: CPT | Mod: 90 | Performed by: PATHOLOGY

## 2024-02-05 PROCEDURE — 36415 COLL VENOUS BLD VENIPUNCTURE: CPT | Performed by: PATHOLOGY

## 2024-02-05 PROCEDURE — 80053 COMPREHEN METABOLIC PANEL: CPT | Performed by: PATHOLOGY

## 2024-02-05 PROCEDURE — 85027 COMPLETE CBC AUTOMATED: CPT | Performed by: PATHOLOGY

## 2024-02-07 NOTE — RESULT ENCOUNTER NOTE
Your hemoglobin is still low but has improved from November 2023. Are you taking an oral iron supplement? How much iron is in your multivitamin?     Your vitamin B12 is a little elevated, please switch to taking your vitamin B12 supplement to every other day.     Your LDL cholesterol is slightly elevated, we can reduce this number with continued weight loss.     All other labs normal at this time.     Vanessa Mcclendon PA-C

## 2024-02-08 LAB
ANNOTATION COMMENT IMP: NORMAL
RETINYL PALMITATE SERPL-MCNC: 0.02 MG/L
VIT A SERPL-MCNC: 0.62 MG/L

## 2024-03-21 ENCOUNTER — TELEPHONE (OUTPATIENT)
Dept: GASTROENTEROLOGY | Facility: CLINIC | Age: 49
End: 2024-03-21
Payer: COMMERCIAL

## 2024-03-21 DIAGNOSIS — Z12.11 ENCOUNTER FOR SCREENING COLONOSCOPY: Primary | ICD-10-CM

## 2024-03-21 NOTE — TELEPHONE ENCOUNTER
Pre visit planning completed.      Procedure details:    Patient scheduled for Colonoscopy  on 4/5/24.     Arrival time: 0825. Procedure time 0910    Facility location: [facility] [place]; [address] Floor, [place] [address]. Check in location: 2nd Floor at Surgery desk.    Sedation type: Conscious sedation     Pre op exam needed? N/A    Indication for procedure: Screening       Chart review:     Electronic implanted devices? No    Recent diagnosis of diverticulitis within the last 6 weeks? No    Diabetic? No      Medication review:    Anticoagulants? No    NSAIDS? No NSAID medications per patient's medication list.  RN will verify with pre-assessment call.    Other medication HOLDING recommendations:  Phentermine: HOLD 7 days before procedure.      Prep for procedure:     Bowel prep recommendation: Standard Miralax  Due to: standard bowel prep.    Prep instructions sent via [employer]         Mandie Fry RN  Endoscopy Procedure Pre Assessment RN  [phone] option 4

## 2024-03-22 RX ORDER — ONDANSETRON 4 MG/1
TABLET, FILM COATED ORAL
Qty: 3 TABLET | Refills: 0 | Status: SHIPPED | OUTPATIENT
Start: 2024-03-22

## 2024-03-22 RX ORDER — BISACODYL 5 MG/1
TABLET, DELAYED RELEASE ORAL
Qty: 4 TABLET | Refills: 0 | Status: SHIPPED | OUTPATIENT
Start: 2024-03-22 | End: 2024-05-24

## 2024-03-22 NOTE — TELEPHONE ENCOUNTER
Pre assessment completed for upcoming procedure.   (Please see previous telephone encounter notes for complete details)    Procedure details:    Arrival time and facility location reviewed.    Pre op exam needed? N/A    Designated  policy reviewed. Instructed to have someone stay 6  hours post procedure.       Medication review:    Medications reviewed. Please see supporting documentation below. Holding recommendations discussed (if applicable).       Prep for procedure:     Procedure prep instructions reviewed.  Patient states they have constipation. Bowel prep recommendation would be Extended Golytely. Bowel prep has been sent to [id number] - [facility] [id number] [id number]    Updated prep instructions sent via [employer]. Also sent zofran scrip to pharmacy.      Any additional information needed:  N/A      Patient  verbalized understanding and had no questions or concerns at this time.      Sasha Kimball RN  [phone] option 4

## 2024-04-01 ENCOUNTER — OFFICE VISIT (OUTPATIENT)
Dept: NEUROLOGY | Facility: CLINIC | Age: 49
End: 2024-04-01
Payer: COMMERCIAL

## 2024-04-01 VITALS
SYSTOLIC BLOOD PRESSURE: 116 MMHG | WEIGHT: 233 LBS | HEIGHT: 68 IN | BODY MASS INDEX: 35.31 KG/M2 | DIASTOLIC BLOOD PRESSURE: 79 MMHG | HEART RATE: 73 BPM

## 2024-04-01 DIAGNOSIS — G35 MS (MULTIPLE SCLEROSIS) (H): Primary | ICD-10-CM

## 2024-04-01 DIAGNOSIS — R26.9 GAIT DISTURBANCE: ICD-10-CM

## 2024-04-01 PROCEDURE — G2211 COMPLEX E/M VISIT ADD ON: HCPCS | Performed by: PSYCHIATRY & NEUROLOGY

## 2024-04-01 PROCEDURE — 99214 OFFICE O/P EST MOD 30 MIN: CPT | Performed by: PSYCHIATRY & NEUROLOGY

## 2024-04-01 RX ORDER — DIPHENHYDRAMINE HYDROCHLORIDE 50 MG/ML
50 INJECTION INTRAMUSCULAR; INTRAVENOUS
Status: CANCELLED
Start: 2024-04-01

## 2024-04-01 RX ORDER — METHYLPREDNISOLONE SODIUM SUCCINATE 125 MG/2ML
125 INJECTION, POWDER, LYOPHILIZED, FOR SOLUTION INTRAMUSCULAR; INTRAVENOUS
Status: CANCELLED
Start: 2024-04-01

## 2024-04-01 RX ORDER — ACETAMINOPHEN 325 MG/1
650 TABLET ORAL ONCE
Status: CANCELLED | OUTPATIENT
Start: 2024-04-01

## 2024-04-01 RX ORDER — DIPHENHYDRAMINE HCL 25 MG
50 CAPSULE ORAL ONCE
Status: CANCELLED | OUTPATIENT
Start: 2024-04-01

## 2024-04-01 RX ORDER — MEPERIDINE HYDROCHLORIDE 25 MG/ML
25 INJECTION INTRAMUSCULAR; INTRAVENOUS; SUBCUTANEOUS EVERY 30 MIN PRN
Status: CANCELLED | OUTPATIENT
Start: 2024-04-01

## 2024-04-01 RX ORDER — ALBUTEROL SULFATE 0.83 MG/ML
2.5 SOLUTION RESPIRATORY (INHALATION)
Status: CANCELLED | OUTPATIENT
Start: 2024-04-01

## 2024-04-01 RX ORDER — METHYLPREDNISOLONE SODIUM SUCCINATE 125 MG/2ML
125 INJECTION, POWDER, LYOPHILIZED, FOR SOLUTION INTRAMUSCULAR; INTRAVENOUS ONCE
Status: CANCELLED | OUTPATIENT
Start: 2024-04-01

## 2024-04-01 RX ORDER — ALBUTEROL SULFATE 90 UG/1
1-2 AEROSOL, METERED RESPIRATORY (INHALATION)
Status: CANCELLED
Start: 2024-04-01

## 2024-04-01 RX ORDER — EPINEPHRINE 1 MG/ML
0.3 INJECTION, SOLUTION INTRAMUSCULAR; SUBCUTANEOUS EVERY 5 MIN PRN
Status: CANCELLED | OUTPATIENT
Start: 2024-04-01

## 2024-04-01 ASSESSMENT — PAIN SCALES - GENERAL: PAINLEVEL: NO PAIN (0)

## 2024-04-01 NOTE — PATIENT INSTRUCTIONS
Proceed with next Ocrevus infusion on or about June 21, 2024    2.   MRI scans in 7 months and return to clinic after

## 2024-04-01 NOTE — Clinical Note
4/1/2024         RE: Chayo Nathan  [id number] [address] [place] [id number]        Dear Colleague,    Thank you for referring your patient, Chayo Nathan, to the [facility] [place]. Please see a copy of my visit note below.    No notes on file    Again, thank you for allowing me to participate in the care of your patient.        Sincerely,        Luiz Love MD

## 2024-04-01 NOTE — LETTER
4/1/2024      RE: Chayo Nathan  [address] [facility] [address]     Referral source: Established patient    Chief complaint: Multiple sclerosis    History of the Present Illness: Ms. Chayo Nathan is a 49 year old right-handed woman who presents to the [facility] today for a scheduled follow up visit regarding her diagnosis of multiple sclerosis.    The patient's history is as per my previous notes.  Initial onset of symptoms of demyelinating disease was about 11 years ago when she noted a dysesthetic, burning sensation in the legs.  Somewhat later, she had an episode likely representing optic neuritis, by history.  Diagnosis of MS was formally confirmed in 2018 after investigations performed when she developed a new left foot drop.  She was initially treated with glatiramer acetate, with therapy later being changed to natalizumab.  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  The last treatment was performed on 12/21/2023.    Today, she denies any new episodic changes in vision, balance, strength or sensation suggestive of new relapse of multiple sclerosis since she was last seen.    Symptomatically, she relates that her balance is slightly off.  She did have one fall caused by tripping over her left foot last summer when she was walking her dogs.  She notes that she needs to actively think about lifting her left foot when she is walking them.    She also notes intermittent neuropathic discomfort in the left foot.    PHYSICAL EXAMINATION:  VITAL SIGNS: Blood pressure 116/79; pulse 73; weight 105.7 kg; height 1.73 m.  GENERAL: Overweight woman who presents to the examination alone, awake and alert and in no acute distress.    NEUROLOGIC EXAMINATION:  CRANIAL NERVES: Visual fields are full to confrontation.  Extraocular movements are intact with no internuclear ophthalmoplegia.  Facial strength is normal.  Palate elevation and tongue  protrusion are normal.  POWER: Strength is within normal limits in proximal and distal muscles in the upper and lower limbs throughout.  REFLEXES: Reflexes are symmetric and within normal limits in the arms and legs.  MOTOR/CEREBELLAR: There is no appendicular ataxia on finger-to-nose testing.  Rapid alternating movements are within normal limits in the hands and fingers.  There is no pronator drift in the arms.  GAIT: The patient is able to ambulate on a flat, level surface with no gross loss of postural stability, and able to walk on heels, toes and in tandem.    Investigations: I reviewed the results of MRI scans of the brain and cervical spine performed earlier on 10/4/2023.  These were stable with no evidence of interval or active demyelination.    Assessment/plan:    1. Multiple sclerosis  The patient is clinically and radiologically stable as regards any evidence of active inflammatory demyelination on current disease modifying therapy with ocrelizumab, which she will continue, with the next infusion due to be performed on or about 6/21/2024.    I will see her back in 7 months for a review with MRI scans of the brain and cervical spine to be performed prior to that appointment for ongoing monitoring of the radiologic stability of her condition.  If those scans continue to appear stable, we can lengthen the imaging interval somewhat from there.    2. Gait disturbance  At present, she is compensating reasonably well for her gait difficulties.  I counseled her on measures to reduce her risk of falls and associated injury from same, including using railings at all times when going up or down stairs and exercising particular caution in winter weather conditions.    The longitudinal plan of care for the diagnoses as documented were addressed during this visit. Due to the added complexity in care, I will continue to support the patient in subsequent management and with ongoing continuity of care.    Luiz HOPKINS  MD Kate   of Neurology  [facility]    Cc:  Shawna Garcia PA-C (PCP)  Patient

## 2024-04-04 ENCOUNTER — TELEPHONE (OUTPATIENT)
Dept: DERMATOLOGY | Facility: CLINIC | Age: 49
End: 2024-04-04
Payer: COMMERCIAL

## 2024-04-04 NOTE — TELEPHONE ENCOUNTER
Patient Contacted, pt requested cancellation of  the following:    Appointment type: Return  Provider: Dr. Jenkins  Return date: 5/7/24  Specialty phone number: [phone]

## 2024-04-05 ENCOUNTER — HOSPITAL ENCOUNTER (OUTPATIENT)
Facility: AMBULATORY SURGERY CENTER | Age: 49
Discharge: HOME | End: 2024-04-05
Attending: INTERNAL MEDICINE | Admitting: INTERNAL MEDICINE
Payer: COMMERCIAL

## 2024-04-05 VITALS
DIASTOLIC BLOOD PRESSURE: 74 MMHG | RESPIRATION RATE: 16 BRPM | SYSTOLIC BLOOD PRESSURE: 117 MMHG | OXYGEN SATURATION: 99 % | HEART RATE: 84 BPM | TEMPERATURE: 97.1 F

## 2024-04-05 LAB — COLONOSCOPY: NORMAL

## 2024-04-05 PROCEDURE — G8907 PT DOC NO EVENTS ON DISCHARG: HCPCS

## 2024-04-05 PROCEDURE — G8918 PT W/O PREOP ORDER IV AB PRO: HCPCS

## 2024-04-05 PROCEDURE — 45378 DIAGNOSTIC COLONOSCOPY: CPT

## 2024-04-05 RX ORDER — NALOXONE HYDROCHLORIDE 0.4 MG/ML
0.4 INJECTION, SOLUTION INTRAMUSCULAR; INTRAVENOUS; SUBCUTANEOUS
Status: DISCONTINUED | OUTPATIENT
Start: 2024-04-05 | End: 2024-04-06 | Stop reason: HOSPADM

## 2024-04-05 RX ORDER — FLUMAZENIL 0.1 MG/ML
0.2 INJECTION, SOLUTION INTRAVENOUS
Status: ACTIVE | OUTPATIENT
Start: 2024-04-05 | End: 2024-04-05

## 2024-04-05 RX ORDER — PROCHLORPERAZINE MALEATE 10 MG
10 TABLET ORAL EVERY 6 HOURS PRN
Status: DISCONTINUED | OUTPATIENT
Start: 2024-04-05 | End: 2024-04-06 | Stop reason: HOSPADM

## 2024-04-05 RX ORDER — NALOXONE HYDROCHLORIDE 0.4 MG/ML
0.2 INJECTION, SOLUTION INTRAMUSCULAR; INTRAVENOUS; SUBCUTANEOUS
Status: DISCONTINUED | OUTPATIENT
Start: 2024-04-05 | End: 2024-04-06 | Stop reason: HOSPADM

## 2024-04-05 RX ORDER — ONDANSETRON 2 MG/ML
4 INJECTION INTRAMUSCULAR; INTRAVENOUS
Status: DISCONTINUED | OUTPATIENT
Start: 2024-04-05 | End: 2024-04-06 | Stop reason: HOSPADM

## 2024-04-05 RX ORDER — FENTANYL CITRATE 50 UG/ML
INJECTION, SOLUTION INTRAMUSCULAR; INTRAVENOUS PRN
Status: DISCONTINUED | OUTPATIENT
Start: 2024-04-05 | End: 2024-04-05 | Stop reason: HOSPADM

## 2024-04-05 RX ORDER — ONDANSETRON 2 MG/ML
4 INJECTION INTRAMUSCULAR; INTRAVENOUS EVERY 6 HOURS PRN
Status: DISCONTINUED | OUTPATIENT
Start: 2024-04-05 | End: 2024-04-06 | Stop reason: HOSPADM

## 2024-04-05 RX ORDER — LIDOCAINE 40 MG/G
CREAM TOPICAL
Status: DISCONTINUED | OUTPATIENT
Start: 2024-04-05 | End: 2024-04-06 | Stop reason: HOSPADM

## 2024-04-05 RX ORDER — ONDANSETRON 4 MG/1
4 TABLET, ORALLY DISINTEGRATING ORAL EVERY 6 HOURS PRN
Status: DISCONTINUED | OUTPATIENT
Start: 2024-04-05 | End: 2024-04-06 | Stop reason: HOSPADM

## 2024-04-05 NOTE — H&P
[facility] Anesthesia Pre-op History and Physical    Chayo Nathan MRN# [id number]   Age: 49 year old YOB: 1975            Date of Exam 4/5/2024         Primary care provider: Shawna Garcia         Chief Complaint and/or Reason for Procedure:     CRC screening - first colonoscopy         Active problem list:     Patient Active Problem List    Diagnosis Date Noted    Class 2 severe obesity with serious comorbidity and body mass index (BMI) of 37.0 to 37.9 in adult, unspecified obesity type (H) 02/02/2024     Priority: Medium    History of gastric bypass 02/02/2024     Priority: Medium    Obesity with body mass index (BMI) of 35.0 to 39.9 without comorbidity 12/12/2023     Priority: Medium    Multiple sclerosis (H) 11/21/2019     Priority: Medium            Medications (include herbals and vitamins):   Any Plavix use in the last 7 days? No     Current Outpatient Medications   Medication Sig Dispense Refill    AMNESTEEM 40 MG capsule Take 2 capsules by mouth daily      bisacodyl (DULCOLAX) 5 MG EC tablet Two days prior to exam take two (2) tablets at 4pm. One day prior to exam take two (2) tablets at 4pm 4 tablet 0    multivitamin w/minerals (THERA-VIT-M) tablet Take 1 tablet by mouth daily      ondansetron (ZOFRAN) 4 MG tablet Take one tablet every six hours for nausea during colonoscopy bowel prepping 3 tablet 0    phentermine (ADIPEX-P) 37.5 MG tablet Take 1 tablet (37.5 mg) by mouth every morning (before breakfast) 30 tablet 2    topiramate (TOPAMAX) 25 MG tablet 25mg at bedtime for week 1, 50mg at bedtime for 1 week, and 75mg at bedtime thereafter 90 tablet 1    vitamin B-12 (CYANOCOBALAMIN) 2500 MCG sublingual tablet Take 2,500 mcg by mouth daily      vitamin C (ASCORBIC ACID) 1000 MG TABS Take 1,000 mg by mouth daily      vitamin D3 (CHOLECALCIFEROL) 85719 units (250 mcg) capsule Take one tablet every other day 15 capsule 11    ocrelizumab (OCREVUS) 300 MG/10ML SOLN injection Every  six months      polyethylene glycol (GOLYTELY) 236 g suspension Take as directed. Two days before your exam fill the first container with water. Cover and shake until mixed well. At 5:00pm drink one 8oz glass every 10-15 minutes until half (1/2) of the first container is empty. Store the remainder in the refrigerator. One day before your exam at 5:00pm drink the second half of the first container until it is gone. Before you go to bed mix the second container with water and put in refrigerator. Six hours before your check in time drink one 8oz glass every 10-15 minutes until half of container is empty. Discard the remainder of solution. 8000 mL 0     Current Facility-Administered Medications   Medication Dose Route Frequency Provider Last Rate Last Admin    lidocaine (LMX4) kit   Topical Q1H PRN McBeath, Yeni, DO        lidocaine 1 % 0.1-1 mL  0.1-1 mL Other Q1H PRN McBeath, Yeni, DO        lidocaine 1% with EPINEPHrine 1:100,000 injection 3 mL  3 mL Intradermal Once Pablo Jenkins MD        ondansetron (ZOFRAN) injection 4 mg  4 mg Intravenous Once PRN McBeath, Yeni, DO        sodium chloride (PF) 0.9% PF flush 3 mL  3 mL Intracatheter Q8H McBeath, Yeni, DO        sodium chloride (PF) 0.9% PF flush 3 mL  3 mL Intracatheter q1 min prn McBeath, Yeni, DO                 Allergies:      Allergies   Allergen Reactions    Bees Anaphylaxis    Ferumoxytol Difficulty breathing    Penicillin G Rash    Sulfa Antibiotics Rash     Allergy to Latex? No  Allergy to tape?   No  Intolerances:             Physical Exam:   All vitals have been reviewed  Patient Vitals for the past 8 hrs:   BP Temp Temp src Pulse Resp SpO2   04/05/24 0844 124/68 97.2  F (36.2  C) Temporal 69 16 97 %     No intake/output data recorded.  Lungs:   No increased work of breathing, good air exchange, clear to auscultation bilaterally, no crackles or wheezing     Cardiovascular:   normal S1 and S2             Lab / Radiology Results:             Anesthetic risk and/or ASA classification:   2    Yeni Cesar DO

## 2024-04-06 NOTE — RESULT ENCOUNTER NOTE
Dear Chayo  Here is your colonoscopy.  Looks like they want you to repeat it in 2-3 yrs.  Please call or MyChart my office with any questions or concerns.   Shawna Garcia, PAC

## 2024-04-11 DIAGNOSIS — E66.01 CLASS 2 SEVERE OBESITY WITH SERIOUS COMORBIDITY AND BODY MASS INDEX (BMI) OF 37.0 TO 37.9 IN ADULT, UNSPECIFIED OBESITY TYPE (H): ICD-10-CM

## 2024-04-11 DIAGNOSIS — E66.812 CLASS 2 SEVERE OBESITY WITH SERIOUS COMORBIDITY AND BODY MASS INDEX (BMI) OF 37.0 TO 37.9 IN ADULT, UNSPECIFIED OBESITY TYPE (H): ICD-10-CM

## 2024-04-11 RX ORDER — TOPIRAMATE 25 MG/1
TABLET, FILM COATED ORAL
Qty: 90 TABLET | Refills: 1 | Status: CANCELLED | OUTPATIENT
Start: 2024-04-11

## 2024-04-16 ENCOUNTER — VIRTUAL VISIT (OUTPATIENT)
Dept: ENDOCRINOLOGY | Facility: CLINIC | Age: 49
End: 2024-04-16
Payer: COMMERCIAL

## 2024-04-16 VITALS — WEIGHT: 231 LBS | HEIGHT: 68 IN | BODY MASS INDEX: 35.01 KG/M2

## 2024-04-16 DIAGNOSIS — E66.812 CLASS 2 SEVERE OBESITY WITH SERIOUS COMORBIDITY AND BODY MASS INDEX (BMI) OF 37.0 TO 37.9 IN ADULT, UNSPECIFIED OBESITY TYPE (H): ICD-10-CM

## 2024-04-16 DIAGNOSIS — E66.01 CLASS 2 SEVERE OBESITY WITH SERIOUS COMORBIDITY AND BODY MASS INDEX (BMI) OF 37.0 TO 37.9 IN ADULT, UNSPECIFIED OBESITY TYPE (H): ICD-10-CM

## 2024-04-16 DIAGNOSIS — E66.9 OBESITY WITH BODY MASS INDEX (BMI) OF 35.0 TO 39.9 WITHOUT COMORBIDITY: Primary | ICD-10-CM

## 2024-04-16 DIAGNOSIS — Z98.84 HISTORY OF GASTRIC BYPASS: ICD-10-CM

## 2024-04-16 PROCEDURE — 99214 OFFICE O/P EST MOD 30 MIN: CPT | Mod: 95

## 2024-04-16 RX ORDER — PHENTERMINE HYDROCHLORIDE 37.5 MG/1
18.75 TABLET ORAL
Qty: 30 TABLET | Refills: 2 | Status: SHIPPED | OUTPATIENT
Start: 2024-04-16 | End: 2024-05-20

## 2024-04-16 RX ORDER — TOPIRAMATE 25 MG/1
TABLET, FILM COATED ORAL
Qty: 120 TABLET | Refills: 3 | Status: SHIPPED | OUTPATIENT
Start: 2024-04-16 | End: 2024-08-15

## 2024-04-16 ASSESSMENT — PAIN SCALES - GENERAL: PAINLEVEL: NO PAIN (0)

## 2024-04-16 NOTE — PROGRESS NOTES
Virtual Visit Details    Type of service:  Video Visit     Originating Location (pt. Location): Home    Distant Location (provider location):  Off-site  Platform used for Video Visit: [place]

## 2024-04-16 NOTE — PATIENT INSTRUCTIONS
"Thank you for allowing us the privilege of caring for you. We hope we provided you with the excellent service you deserve.   Please let us know if there is anything else we can do for you so that we can be sure you are completely satisfied with your care experience.    To ensure the quality of our services you may be receiving a patient satisfaction survey from an independent patient satisfaction monitoring company.    The greatest compliment you can give is a \"Likely to Recommend\"    Your visit was with Vanessa Mcclendon PA-C today.    Instructions per today's visit:     Dionisio Nathan, it was great to visit with you today.  Here is a review of our visit.  If our clinic scheduler is not able to reach you please call [phone] to schedule your next appointments.    Plan  Congrats on your successful weight loss!   Decrease phentermine to 18.75mg nightly, contact clinic if your sleep is still not improving or worsening   Increase topiramate to 50mg in the morning and 50mg at night.  Alternatives discussed: contrave, wegovy (reports that insurance would require her to try phentermine and contrave prior to approving wegovy)    Goals we discussed today:   Working in weight training into the week (wall pushups, planks, lunges, squats, free weights)  Labs ordered today: cmp, cbc   Follow up with Vanessa in 3 months   Dietician appointment prn   Keep up the excellent work!       Information about Video Visits with [employer]: video visit information  _________________________________________________________________________________________________________________________________________________________  If you are asked by your clinic team to have your blood pressure checked:  [facility] do offer several locations for blood pressure checks. Please follow the below link to schedule an appointment. Scheduling an appointment at the pharmacy for a blood pressure check is now preferred.    Appointment Plus " ([email or web address])  _________________________________________________________________________________________________________________________________________________________  Important contact and scheduling information:  Please call our contact center at [phone] to schedule your next appointments.  To find a lab location near you, please call [phone].  For any nursing questions or concerns call Hayley Zamora LPN at [phone] or Xiomy Neil RN at [phone]  Please call during clinic hours Monday through Friday 8:00a - 4:00p if you have questions or you can contact us via [employer] at anytime and we will reply during clinic hours.    Lab results will be communicated through My Chart or letter (if My Chart not used). Please call the clinic if you have not received communication after 1 week or if you have any questions.?  Clinic Fax: [phone]    _________________________________________________________________________________________________________________________________________________________  Meal Replacement Products:    Here is the link to our new e-store where you can purchase our meal replacement products    [facility] E-Store  [email or web address]    The one week starter kit is a great way to sample a variety of products and see what works for you.    If you want more information about the product go to: [employer] Meals  [email or web address]    If you are an employee or [facility] or [facility] please contact your care team for a 10% estore discount    Free Shipping for orders over $75     Benefits of meal replacements products:    Portion and calorie control  Improved nutrition  Structured eating  Simplified food choices  Avoid contact with trigger foods  _________________________________________________________________________________________________________________________________________________________  Interested in working with a health  ?  Health coaches work with you to improve your overall health and wellbeing.  They look at the whole person, and may involve discussion of different areas of life, including, but not limited to the four pillars of health (sleep, exercise, nutrition, and stress management). Discuss with your care team if you would like to start working a health .  Health Coaching-3 Pack: Schedule by calling [phone]    $99 for three health coaching visits    Visits may be done in person or via phone    Coaching is a partnership between the  and the client; Coaches do not prescribe or diagnose    Coaching helps inspire the client to reach his/her personal goals   _________________________________________________________________________________________________________________________________________________________  24 Week Healthy Lifestyle Plan:    Our mission in the 24-week Healthy Lifestyle Plan is to provide you with individualized care by giving you the tools, education and support you need to lose weight and maintain a healthy lifestyle. In your 24-week journey, you ll be supported by a dedicated weight loss team that includes registered dietitians, medical weight management providers, health coaches, and nurses -- all with special expertise in weight loss -- to help you every step of the way.     Monthly meetings with your registered dietician or medical weight management provider help to review your progress, update your care plan, and make any adjustments needed to ensure success. Between these visits, weekly and bi-weekly health  visits will help you focus on the four pillars of weight loss -- stress, sleep, nutrition, and exercise -- and how you can best adapt each to achieve sustainable weight loss results.    In addition, you will be given exclusive access to online wellbeing classes through [employer].  Your initial visit will be with a medical weight management provider who will help to  understand your weight loss goals and ensure this program is the right fit for you. Please let our team know if you are interested in the 24 week plan by sending a message to your care team or calling [phone] to schedule.  _________________________________________________________________________________________________________________________________________________________  __________  [facility] Get Moving Program  Our team of physical therapists is trained to help you understand and take control of your condition. They will perform a thorough evaluation to determine your ability for activity and develop a customized plan to fit your goals and physical ability.  Scheduling: Unsure if the Get Moving program is right for you? Discuss the program with your medical provider or diabetes educator. You can also call us at [phone] to ask questions or schedule an appointment.   [facility] Get Moving Program  ____________________________________________________________________________________________________________________________________________________________________________  [facility] Diabetes Prevention Program (DPP)  If you have prediabetes and Medicare please contact us via [employer] to learn more about the Diabetes Prevention Program (DPP)  Program Details:   [employer] [facility] offers the year-long Diabetes Prevention Program (DPP). The program helps you to make lifestyle changes that prevent or delay type 2 diabetes by supporting healthy eating, increased physical activity, stress reduction and use of coping skills.   On average, previous [facility] DPP cohorts have lost and maintained at least 5% of their starting weight throughout the program and averaged more than 150 minutes of physical activity per week.  Participants meet weekly for one-hour group sessions over sixteen weeks, every other week for the next 8 weeks, and monthly for the last six months.   A year-long  maintenance program is also available for participants who complete the first year.   Location & Cost:   During the COVID-19 Public Health Emergency, the program is offered virtually. When in-person classes can resume, they will be held at [facility].  For people with Medicare, the program is covered in full. A self-pay option will also be available for those with non-Medicare insurance plans.   ______________________________________________________________________________________________________________________________________________________________________________________________________________________________    To work with a Behavioral Health Psychologist:    Call to schedule:    Cain Arndt - [phone]  Mirta Bahena - [phone]  Siria Valdez - [phone]  Eduarda Hairston - [phone]   Clarisse Jackson PhD (cannot accept Medicare) [phone]        Thank you,   [facility] Comprehensive Weight Management Team

## 2024-04-16 NOTE — NURSING NOTE
Is the patient currently in the state of [place]? YES    Visit mode:VIDEO    If the visit is dropped, the patient can be reconnected by: VIDEO VISIT: Text to cell phone:   Telephone Information:   Mobile [phone]       Will anyone else be joining the visit? NO  (If patient encounters technical issues they should call [phone])    How would you like to obtain your AVS? MyChart    Are changes needed to the allergy or medication list? No    Are refills needed on medications prescribed by this physician? NO    Reason for visit: RECHECK    Fred PEÑA

## 2024-04-16 NOTE — LETTER
2024       RE: Chayo Nathan  [address] [facility] [address]     Dear Colleague,    Thank you for referring your patient, Chayo Nathan, to the [facility] at [facility]. Please see a copy of my visit note below.      Return Medical Weight Management Note     Chayo Nathan  MRN:  [id number]  :  1975  VIOLET:  2024    Dear Shawna Garcia PA-C,    I had the pleasure of seeing your patient Chayo Nathan. She is a 49 year old female who I am continuing to see for treatment of obesity related to:        2024     2:39 PM   --   I have the following health issues associated with obesity Stress Incontinence   I have the following symptoms associated with obesity Knee Pain    Lower Extremity Swelling    Fatigue    Groin Rash       Assessment & Plan  Problem List Items Addressed This Visit       Class 2 severe obesity with serious comorbidity and body mass index (BMI) of 37.0 to 37.9 in adult, unspecified obesity type (H)    Relevant Medications    phentermine (ADIPEX-P) 37.5 MG tablet    topiramate (TOPAMAX) 25 MG tablet    History of gastric bypass    Relevant Medications    phentermine (ADIPEX-P) 37.5 MG tablet    Obesity with body mass index (BMI) of 35.0 to 39.9 without comorbidity - Primary    Relevant Medications    phentermine (ADIPEX-P) 37.5 MG tablet    topiramate (TOPAMAX) 25 MG tablet    Other Relevant Orders    CBC with Platelets and Reflex to Iron Studies    Comprehensive metabolic panel      Plan  Congrats on your successful weight loss!   Decrease phentermine to 18.75mg nightly, contact clinic if your sleep is still not improving or worsening   Increase topiramate to 50mg in the morning and 50mg at night.  Alternatives discussed: contrave, wegovy (reports that insurance would require her to try phentermine and contrave prior to approving wegovy)    Goals we discussed today:    Working in weight training into the week (wall pushups, planks, lunges, squats, free weights)  Labs ordered today: cmp, cbc   Follow up with Vanessa in 3 months   Dietician appointment prn   Keep up the excellent work!         INTERVAL HISTORY:  RYGB 2007   Patient with joint pain, fatigue. Additionally diagnosed with MS in 2018, managed by neurologist Dr. Love, is currently getting an infusion for this once every 6 months. Only current MS symptom she experiences is an occasional left foot drop. Additionally has dealt with iron deficiency anemia over the last several years, this has been determined to be in part due to history of rygb and also heavy menstrual cycles. Had a hysterectomy last year and hopes this has resolved these issues, also takes multivitamin with iron.     New MWM with me 2/2/24- had started phentermine 37.5mg 1 month prior through pcp. We started topiramate in addition.     Since last visit:   Has seen 18lbs weight loss over the last 2 months.  Reports things are going well, has seen some plateauing over the last few weeks.   Feels clothes are fitting better.    Discussed working in weight training to her week to help break through plateau.     Could consider adding contrave in the future.     Was unable to see RD, will schedule as needed moving forward.    Could consider wegovy in the future, Chayo reports her insurance requires her to first try qsymia and contrave.     Wt Readings from Last 5 Encounters:   04/16/24 104.8 kg (231 lb)   04/01/24 105.7 kg (233 lb)   02/02/24 113.3 kg (249 lb 12.8 oz)   01/15/24 115.3 kg (254 lb 3.2 oz)   01/10/24 115.3 kg (254 lb 3.2 oz)       Anti-obesity medication history    Current:   Phentermine 37.5mg- recent blood pressure 116/79. Does endorse issues falling asleep with the phentermine, will also wake up in the middle of the night for a few hours since starting the phentermine. Feels that this is gradually improving, but still experiences this a few  times a week.     Topiramate 75mg- changed the taste of pop which has helped her reduce pop cravings. No other side effects. Worsened bloating since starting topiramate, has since resolved.     Past/Failed/contraindicated:         Recent diet changes: working on having smaller portions. Eating smaller portions at meals. Has largely cut out pop.     Protein: yogurt, cheese stick. Does not track protein, did this in the past and felt she became obsessive about it. 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 Considering weight training.       Recent sleep changes: some worsened quality of sleep since increasing phentermine, sleep disrupted a few times a week.     Vitamins/Labs: labs 2/7/24- low but improving hemoglobin compared to November 7, 2023 (going from 10.9 to 11.5) . Will recheck cbc and cmp today.         CURRENT WEIGHT:   231 lbs 0 oz    Initial Weight (lbs): 254 lbs  Last Visits Weight: 105.7 kg (233 lb)  Cumulative weight loss (lbs): 23  Weight Loss Percentage: 9.06%        4/16/2024     2:25 PM   Changes and Difficulties   I have made the following changes to my diet since my last visit: Portion control   With regards to my diet, I am still struggling with: Sugar cravings   I have made the following changes to my activity/exercise since my last visit: Incresed walking   With regards to my activity/exercise, I am still struggling with: Stamina             MEDICATIONS:   Current Outpatient Medications   Medication Sig Dispense Refill    phentermine (ADIPEX-P) 37.5 MG tablet Take 0.5 tablets (18.75 mg) by mouth every morning (before breakfast) 30 tablet 2    topiramate (TOPAMAX) 25 MG tablet 50mg (2 tablets) in the morning and 50mg (2 tablets) at night for a total of 100mg each day. 120 tablet 3    AMNESTEEM 40 MG capsule Take 2 capsules by mouth daily      bisacodyl (DULCOLAX) 5 MG EC tablet Two days prior to  "exam take two (2) tablets at 4pm. One day prior to exam take two (2) tablets at 4pm 4 tablet 0    multivitamin w/minerals (THERA-VIT-M) tablet Take 1 tablet by mouth daily      ocrelizumab (OCREVUS) 300 MG/10ML SOLN injection Every six months      ondansetron (ZOFRAN) 4 MG tablet Take one tablet every six hours for nausea during colonoscopy bowel prepping 3 tablet 0    polyethylene glycol (GOLYTELY) 236 g suspension Take as directed. Two days before your exam fill the first container with water. Cover and shake until mixed well. At 5:00pm drink one 8oz glass every 10-15 minutes until half (1/2) of the first container is empty. Store the remainder in the refrigerator. One day before your exam at 5:00pm drink the second half of the first container until it is gone. Before you go to bed mix the second container with water and put in refrigerator. Six hours before your check in time drink one 8oz glass every 10-15 minutes until half of container is empty. Discard the remainder of solution. 8000 mL 0    vitamin B-12 (CYANOCOBALAMIN) 2500 MCG sublingual tablet Take 2,500 mcg by mouth daily      vitamin C (ASCORBIC ACID) 1000 MG TABS Take 1,000 mg by mouth daily      vitamin D3 (CHOLECALCIFEROL) 74239 units (250 mcg) capsule Take one tablet every other day 15 capsule 11           4/16/2024     2:25 PM   Weight Loss Medication History Reviewed With Patient   Which weight loss medications are you currently taking on a regular basis? Qysmia (phentermine/topiramate)   Are you having any side effects from the weight loss medication that we have prescribed you? Yes   If you are having side effects please describe: Insomnia                No data to display                  PHYSICAL EXAM:  Objective   Ht 1.727 m (5' 8\")   Wt 104.8 kg (231 lb)   LMP 08/28/2023 (Approximate)   BMI 35.12 kg/m      Vitals - Patient Reported  Pain Score: No Pain (0)      Vitals:  No vitals were obtained today due to virtual visit.    GENERAL: alert " and no distress  EYES: Eyes grossly normal to inspection.  No discharge or erythema, or obvious scleral/conjunctival abnormalities.  RESP: No audible wheeze, cough, or visible cyanosis.    SKIN: Visible skin clear. No significant rash, abnormal pigmentation or lesions.  NEURO: Cranial nerves grossly intact.  Mentation and speech appropriate for age.  PSYCH: Appropriate affect, tone, and pace of words        Sincerely,    Vanessa Mcclendon PA-C      38 minutes spent by me on the date of the encounter doing chart review, history and exam, documentation and further activities per the note    Virtual Visit Details    Type of service:  Video Visit     Originating Location (pt. Location): Home    Distant Location (provider location):  Off-site  Platform used for Video Visit: [employer]

## 2024-04-16 NOTE — PROGRESS NOTES
Return Medical Weight Management Note     Chayo Nathan  MRN:  [id number]  :  1975  VIOLET:  2024    Dear Shawna Garcia PA-C,    I had the pleasure of seeing your patient Chayo Nathan. She is a 49 year old female who I am continuing to see for treatment of obesity related to:        2024     2:39 PM   --   I have the following health issues associated with obesity Stress Incontinence   I have the following symptoms associated with obesity Knee Pain    Lower Extremity Swelling    Fatigue    Groin Rash       Assessment & Plan   Problem List Items Addressed This Visit       Class 2 severe obesity with serious comorbidity and body mass index (BMI) of 37.0 to 37.9 in adult, unspecified obesity type (H)    Relevant Medications    phentermine (ADIPEX-P) 37.5 MG tablet    topiramate (TOPAMAX) 25 MG tablet    History of gastric bypass    Relevant Medications    phentermine (ADIPEX-P) 37.5 MG tablet    Obesity with body mass index (BMI) of 35.0 to 39.9 without comorbidity - Primary    Relevant Medications    phentermine (ADIPEX-P) 37.5 MG tablet    topiramate (TOPAMAX) 25 MG tablet    Other Relevant Orders    CBC with Platelets and Reflex to Iron Studies    Comprehensive metabolic panel      Plan  Congrats on your successful weight loss!   Decrease phentermine to 18.75mg nightly, contact clinic if your sleep is still not improving or worsening   Increase topiramate to 50mg in the morning and 50mg at night.  Alternatives discussed: contrave, wegovy (reports that insurance would require her to try phentermine and contrave prior to approving wegovy)    Goals we discussed today:   Working in weight training into the week (wall pushups, planks, lunges, squats, free weights)  Labs ordered today: cmp, cbc   Follow up with Vanessa in 3 months   Dietician appointment prn   Keep up the excellent work!         INTERVAL HISTORY:  RYGB    Patient with joint pain, fatigue. Additionally diagnosed with MS in  2018, managed by neurologist Dr. Love, is currently getting an infusion for this once every 6 months. Only current MS symptom she experiences is an occasional left foot drop. Additionally has dealt with iron deficiency anemia over the last several years, this has been determined to be in part due to history of rygb and also heavy menstrual cycles. Had a hysterectomy last year and hopes this has resolved these issues, also takes multivitamin with iron.     New MWM with me 2/2/24- had started phentermine 37.5mg 1 month prior through pcp. We started topiramate in addition.     Since last visit:   Has seen 18lbs weight loss over the last 2 months.  Reports things are going well, has seen some plateauing over the last few weeks.   Feels clothes are fitting better.    Discussed working in weight training to her week to help break through plateau.     Could consider adding contrave in the future.     Was unable to see RD, will schedule as needed moving forward.    Could consider wegovy in the future, Chayo reports her insurance requires her to first try qsymia and contrave.     Wt Readings from Last 5 Encounters:   04/16/24 104.8 kg (231 lb)   04/01/24 105.7 kg (233 lb)   02/02/24 113.3 kg (249 lb 12.8 oz)   01/15/24 115.3 kg (254 lb 3.2 oz)   01/10/24 115.3 kg (254 lb 3.2 oz)       Anti-obesity medication history    Current:   Phentermine 37.5mg- recent blood pressure 116/79. Does endorse issues falling asleep with the phentermine, will also wake up in the middle of the night for a few hours since starting the phentermine. Feels that this is gradually improving, but still experiences this a few times a week.     Topiramate 75mg- changed the taste of pop which has helped her reduce pop cravings. No other side effects. Worsened bloating since starting topiramate, has since resolved.     Past/Failed/contraindicated:         Recent diet changes: working on having smaller portions. Eating smaller portions at meals. Has  largely cut out pop.     Protein: yogurt, cheese stick. Does not track protein, did this in the past and felt she became obsessive about it. 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 Considering weight training.       Recent sleep changes: some worsened quality of sleep since increasing phentermine, sleep disrupted a few times a week.     Vitamins/Labs: labs 2/7/24- low but improving hemoglobin compared to November 7, 2023 (going from 10.9 to 11.5) . Will recheck cbc and cmp today.         CURRENT WEIGHT:   231 lbs 0 oz    Initial Weight (lbs): 254 lbs  Last Visits Weight: 105.7 kg (233 lb)  Cumulative weight loss (lbs): 23  Weight Loss Percentage: 9.06%        4/16/2024     2:25 PM   Changes and Difficulties   I have made the following changes to my diet since my last visit: Portion control   With regards to my diet, I am still struggling with: Sugar cravings   I have made the following changes to my activity/exercise since my last visit: Incresed walking   With regards to my activity/exercise, I am still struggling with: Stamina             MEDICATIONS:   Current Outpatient Medications   Medication Sig Dispense Refill    phentermine (ADIPEX-P) 37.5 MG tablet Take 0.5 tablets (18.75 mg) by mouth every morning (before breakfast) 30 tablet 2    topiramate (TOPAMAX) 25 MG tablet 50mg (2 tablets) in the morning and 50mg (2 tablets) at night for a total of 100mg each day. 120 tablet 3    AMNESTEEM 40 MG capsule Take 2 capsules by mouth daily      bisacodyl (DULCOLAX) 5 MG EC tablet Two days prior to exam take two (2) tablets at 4pm. One day prior to exam take two (2) tablets at 4pm 4 tablet 0    multivitamin w/minerals (THERA-VIT-M) tablet Take 1 tablet by mouth daily      ocrelizumab (OCREVUS) 300 MG/10ML SOLN injection Every six months      ondansetron (ZOFRAN) 4 MG tablet Take one tablet every six hours for nausea  "during colonoscopy bowel prepping 3 tablet 0    polyethylene glycol (GOLYTELY) 236 g suspension Take as directed. Two days before your exam fill the first container with water. Cover and shake until mixed well. At 5:00pm drink one 8oz glass every 10-15 minutes until half (1/2) of the first container is empty. Store the remainder in the refrigerator. One day before your exam at 5:00pm drink the second half of the first container until it is gone. Before you go to bed mix the second container with water and put in refrigerator. Six hours before your check in time drink one 8oz glass every 10-15 minutes until half of container is empty. Discard the remainder of solution. 8000 mL 0    vitamin B-12 (CYANOCOBALAMIN) 2500 MCG sublingual tablet Take 2,500 mcg by mouth daily      vitamin C (ASCORBIC ACID) 1000 MG TABS Take 1,000 mg by mouth daily      vitamin D3 (CHOLECALCIFEROL) 29629 units (250 mcg) capsule Take one tablet every other day 15 capsule 11           4/16/2024     2:25 PM   Weight Loss Medication History Reviewed With Patient   Which weight loss medications are you currently taking on a regular basis? Qysmia (phentermine/topiramate)   Are you having any side effects from the weight loss medication that we have prescribed you? Yes   If you are having side effects please describe: Insomnia                No data to display                  PHYSICAL EXAM:  Objective    Ht 1.727 m (5' 8\")   Wt 104.8 kg (231 lb)   LMP 08/28/2023 (Approximate)   BMI 35.12 kg/m      Vitals - Patient Reported  Pain Score: No Pain (0)      Vitals:  No vitals were obtained today due to virtual visit.    GENERAL: alert and no distress  EYES: Eyes grossly normal to inspection.  No discharge or erythema, or obvious scleral/conjunctival abnormalities.  RESP: No audible wheeze, cough, or visible cyanosis.    SKIN: Visible skin clear. No significant rash, abnormal pigmentation or lesions.  NEURO: Cranial nerves grossly intact.  Mentation " and speech appropriate for age.  PSYCH: Appropriate affect, tone, and pace of words        Sincerely,    Vanessa Mcclendon PA-C      38 minutes spent by me on the date of the encounter doing chart review, history and exam, documentation and further activities per the note

## 2024-04-25 ENCOUNTER — VIRTUAL VISIT (OUTPATIENT)
Dept: ENDOCRINOLOGY | Facility: CLINIC | Age: 49
End: 2024-04-25
Payer: COMMERCIAL

## 2024-04-25 VITALS — BODY MASS INDEX: 35.01 KG/M2 | HEIGHT: 68 IN | WEIGHT: 231 LBS

## 2024-04-25 DIAGNOSIS — E66.9 OBESITY WITH BODY MASS INDEX (BMI) OF 35.0 TO 39.9 WITHOUT COMORBIDITY: ICD-10-CM

## 2024-04-25 DIAGNOSIS — Z98.84 HISTORY OF GASTRIC BYPASS: ICD-10-CM

## 2024-04-25 DIAGNOSIS — Z71.3 NUTRITIONAL COUNSELING: Primary | ICD-10-CM

## 2024-04-25 PROCEDURE — 97802 MEDICAL NUTRITION INDIV IN: CPT | Mod: 95 | Performed by: DIETITIAN, REGISTERED

## 2024-04-25 PROCEDURE — 99207 PR NO CHARGE LOS: CPT | Mod: 95 | Performed by: DIETITIAN, REGISTERED

## 2024-04-25 RX ORDER — ISOTRETINOIN 30 MG/1
CAPSULE ORAL
COMMUNITY
Start: 2024-02-27 | End: 2024-08-14

## 2024-04-25 ASSESSMENT — PAIN SCALES - GENERAL: PAINLEVEL: NO PAIN (0)

## 2024-04-25 NOTE — PATIENT INSTRUCTIONS
Resources:     Protein snack ideas:    - Cottage cheese   - Greek yogurt   - Low-fat string cheese   - Hard boiled eggs   - 100 kcal nut pack paired with something else    - Protein bar   - Protein shake    - Roasted chickpeas    - Deli meat   - Fruit with nut butter    - Protein ball (With protein powder or nut butter)     Could add Zucchini to smoothies as a way to get some vegetable in and make it creamier.       Pumpkin Pie Protein Shake   1 cup low-fat milk   1/3 cup pumpkin puree not pie filling   1 scoop vanilla protein powder   1 teaspoon granulated Stevia   1/8 teaspoon pumpkin spice   1 cup ice     In a  combine all ingredients. Blend until smooth.   Garnish if desired with a dash of pumpkin spice or cinnamon.     Eggnog Protein Shake   1 Vanilla Protein Shake   1 cup ice   1/2 tsp Rum extract   1/8 tsp Cinnamon   1/8 tsp Nutmeg     Combine vanilla protein shake and ice in a ahmadi. Add in rum extract and cinnamon. Blend shake until smooth. Sprinkle nutmeg on the top of your protein shake.    Chocolate Peppermint Protein Shake   1 Chocolate Protein Shake   1 cup ice    -   tsp peppermint extract     Combine chocolate protein shake and ice in a ahmadi. Add in peppermint extract (start with a   tsp peppermint extra can be an overpowering flavor). Blend shake until smooth.     Chocolate Danese Protein Shake  1 Chocolate Protein Shake   1 cup ice (optional)  1 tsp almond extract    Combine chocolate protein shake and ice in a ahmadi. Add in almond extract. Blend shake until smooth.     Apple Pie Protein Shake  1 Vanilla Protein Shake   1 cup ice (optional)  1/2 tsp apple pie spice     Combine vanilla protein shake and ice in a ahmadi. Add in apple pie spice. Blend shake until smooth.    Cake Batter Protein Shake     1  Vanilla Protein Shake    1 cup ice (optional)   3/4 tsp almond extract    Combine vanilla protein shake and ice in a ahmadi. Add in almond extract. Blend shake until  smooth.      Blueberry Banana Smoothie with Avocado  Makes 1 serving    2 scoops Vanilla Protein Powder   1/3 Banana, peeled (35 kcals)  1/3 Avocado, peeled and pit removed (110 kcals)  1/3 cup Blueberry (28 kcals)  1 cup Water  1/3 cup Ice  Place all ingredients in the bowl of a  and purée until smooth.     Peanut Butter Sweet Potato Smoothie with Coconut and Jannie   Makes 1 serving    2 scoops Vanilla Protein Powder  1/3 cup Sweet Potato, roasted and peeled (38 kcals)  2 Tablespoons Peanut Butter (188 kcals)  1 teaspoon Fresh Jannie, peeled and grated  1 cups Water  1/3 cup Coconut, unsweetened flakes (94 kcals)  1/3 cup Ice    Place all ingredients in the bowl of a  and puree until smooth.     Strawberry Orange Smoothie with Spinach and Turmeric (210 kcals)  Makes 1 servings    2 scoops Vanilla Protein Pwder (160 kcals)  2/3 cups Strawberries (30 kcals)  1/3 whole Orange - peeled (20 kcals)  2/3 cups Spinach, fresh  2 teaspoons ground Flax Meal  1/3 teaspoon Turmeric, dry  1 cup Water  1/3 cup Ice  Place all ingredients in the bowl of a  and puree until smooth.    Peanut butter and Jelly Smoothie     2/3 cup non-fat Greek yogurt  1 tbsp peanut butter  1 cup frozen mixed berries  1 cup water  Place all ingredients in the bowl of a  and puree until smooth.    Post Op General Handouts if ever needed    Supplements after Gastric Bypass  [email or web address]    Keeping Up Your Diet after Weight Loss Surgery  [email or web address]    Preventing Low Blood Sugar after Weight Loss Surgery  [email or web address]     Preventing Dumping Syndrome after Weight Loss Surgery  [email or web address]     Follow-Up:  PRN per pt    Marlen Mitchell), MPPJefersonD, RD, LD  Clinic #: [phone]

## 2024-04-25 NOTE — PROGRESS NOTES
"Video-Visit Details    Type of service:  Video Visit    Video Start Time: 2:34 pm   Video End Time: 2:58 pm    Originating Location (pt. Location): Home    Distant Location (provider location):  Offsite (providers home)     Platform used for Video Visit: zerobound      Nutrition Assessment  Reason For Visit:  Chayo Nathan is a 49 year old female presents today for new re-establish nutrition visit. Pt with history of RNYGB in 2007.     Patient referred by Vanessa Mcclendon PA-C on April 16, 2024.      Anthropometrics:  Weight 12/22/23: 258 lbs per chart review  Weight 4/16/24: 231 lbs with BMI 35.12    Estimated body mass index is 35.13 kg/m  as calculated from the following:    Height as of this encounter: 1.727 m (5' 7.99\").    Weight as of this encounter: 104.8 kg (231 lb).    Current weight: 231 lbs, pt report    Weight Loss Medications:   Phentermine   Topiramate     Current Vitamins/Minerals:   Multivitamin with iron  Vitamin D   Mg   B12   Vit C on/off - more so in winter    Labs 2/5/24  Hgb low but improved from Nov 2023  B12 elevated   LDL chol elevated     Hx of iron related anemia - hx has heavy menstrual cycles. Need IV iron in past Hysterectomy Dec 2023.   Gets bad ice cravings when low.     Nutrition History:  NKFA  Not huge on raw vegetables     Saw Vanessa a couple months ago for first appt. Was unable to meet with me due to a work conflict.   Started phen/top at that time - going well. Has lost 18+ lbs since that time. Working on smaller portions. Significantly decreased pop intake. Does not taste as good since starting topiramate. Hx has loved diet mtn dew. Now doing mostly water or sugar-free lemonade.     Reports her biggest issue is sugar - feels addicted. Trying to focus on protein and lower carb.     Pt goal for today: discuss protein snack ideas    PA: walking on treadmill, walking dogs    Additional information:  Nurse - computer based          No data to display                     No data to " display                Nutrition Prescription:  Grams Protein: 50-60 (minimum)  Amount of Fluid: 48-64 oz    Nutrition Diagnosis  Obesity r/t long history of positive energy balance aeb BMI >30.    Intervention  Reviewed current dietary habits and pts history   Discussed long-term goals pt hopes to accomplish in RD appointments   Answered pt questions  Coordination of care - reviewed recent labs briefly  Nutrition education   AVS and handouts via [employer]ion[employer]    Expected Engagement: good    Resources:     Protein snack ideas:    - Cottage cheese   - Greek yogurt   - Low-fat string cheese   - Hard boiled eggs   - 100 kcal nut pack paired with something else    - Protein bar   - Protein shake    - Roasted chickpeas    - Deli meat   - Fruit with nut butter    - Protein ball (With protein powder or nut butter)     Could add Zucchini to smoothies as a way to get some vegetable in and make it creamier.       Pumpkin Pie Protein Shake   1 cup low-fat milk   1/3 cup pumpkin puree not pie filling   1 scoop vanilla protein powder   1 teaspoon granulated Stevia   1/8 teaspoon pumpkin spice   1 cup ice     In a  combine all ingredients. Blend until smooth.   Garnish if desired with a dash of pumpkin spice or cinnamon.     Eggnog Protein Shake   1 Vanilla Protein Shake   1 cup ice   1/2 tsp Rum extract   1/8 tsp Cinnamon   1/8 tsp Nutmeg     Combine vanilla protein shake and ice in a ahmadi. Add in rum extract and cinnamon. Blend shake until smooth. Sprinkle nutmeg on the top of your protein shake.    Chocolate Peppermint Protein Shake   1 Chocolate Protein Shake   1 cup ice    -   tsp peppermint extract     Combine chocolate protein shake and ice in a ahmadi. Add in peppermint extract (start with a   tsp peppermint extra can be an overpowering flavor). Blend shake until smooth.     Chocolate Eugene Protein Shake  1 Chocolate Protein Shake   1 cup ice (optional)  1 tsp almond extract    Combine chocolate protein shake and ice  in a ahmadi. Add in almond extract. Blend shake until smooth.     Apple Pie Protein Shake  1 Vanilla Protein Shake   1 cup ice (optional)  1/2 tsp apple pie spice     Combine vanilla protein shake and ice in a ahmadi. Add in apple pie spice. Blend shake until smooth.    Cake Batter Protein Shake     1  Vanilla Protein Shake    1 cup ice (optional)   3/4 tsp almond extract    Combine vanilla protein shake and ice in a ahmadi. Add in almond extract. Blend shake until smooth.      Blueberry Banana Smoothie with Avocado  Makes 1 serving    2 scoops Vanilla Protein Powder   1/3 Banana, peeled (35 kcals)  1/3 Avocado, peeled and pit removed (110 kcals)  1/3 cup Blueberry (28 kcals)  1 cup Water  1/3 cup Ice  Place all ingredients in the bowl of a  and purée until smooth.     Peanut Butter Sweet Potato Smoothie with Coconut and Jannie   Makes 1 serving    2 scoops Vanilla Protein Powder  1/3 cup Sweet Potato, roasted and peeled (38 kcals)  2 Tablespoons Peanut Butter (188 kcals)  1 teaspoon Fresh Jannie, peeled and grated  1 cups Water  1/3 cup Coconut, unsweetened flakes (94 kcals)  1/3 cup Ice    Place all ingredients in the bowl of a  and puree until smooth.     Strawberry Orange Smoothie with Spinach and Turmeric (210 kcals)  Makes 1 servings    2 scoops Vanilla Protein Pwder (160 kcals)  2/3 cups Strawberries (30 kcals)  1/3 whole Orange - peeled (20 kcals)  2/3 cups Spinach, fresh  2 teaspoons ground Flax Meal  1/3 teaspoon Turmeric, dry  1 cup Water  1/3 cup Ice  Place all ingredients in the bowl of a  and puree until smooth.    Peanut butter and Jelly Smoothie     2/3 cup non-fat Greek yogurt  1 tbsp peanut butter  1 cup frozen mixed berries  1 cup water  Place all ingredients in the bowl of a  and puree until smooth.    Post Op General Handouts if ever needed    Supplements after Gastric Bypass  [email or web address]    Keeping Up Your Diet after Weight Loss  Surgery  [email or web address]    Preventing Low Blood Sugar after Weight Loss Surgery  [email or web address]     Preventing Dumping Syndrome after Weight Loss Surgery  [email or web address]     Follow-Up:  PRN per pt    Time spent with patient: 24 minutes.  DARRYL Phillips, RD, LD

## 2024-04-25 NOTE — LETTER
"4/25/2024       RE: Chayo Nathan  [address] [facility] [address]     Dear Colleague,    Thank you for referring your patient, Chayo Nathan, to the [facility] [place] [facility]. Please see a copy of my visit note below.    Video-Visit Details    Type of service:  Video Visit    Video Start Time: 2:34 pm   Video End Time: 2:58 pm    Originating Location (pt. Location): Home    Distant Location (provider location):  Offsite (providers home)     Platform used for Video Visit: Offerum      Nutrition Assessment  Reason For Visit:  Chayo Nathan is a 49 year old female presents today for new -[facility] nutrition visit. Pt with history of RNYGB in 2007.     Patient referred by Vanessa Mcclendon PA-C on April 16, 2024.      Anthropometrics:  Weight 12/22/23: 258 lbs per chart review  Weight 4/16/24: 231 lbs with BMI 35.12    Estimated body mass index is 35.13 kg/m  as calculated from the following:    Height as of this encounter: 1.727 m (5' 7.99\").    Weight as of this encounter: 104.8 kg (231 lb).    Current weight: 231 lbs, pt report    Weight Loss Medications:   Phentermine   Topiramate     Current Vitamins/Minerals:   Multivitamin with iron  Vitamin D   Mg   B12   Vit C on/off - more so in winter    Labs 2/5/24  Hgb low but improved from Nov 2023  B12 elevated   LDL chol elevated     Hx of iron related anemia - hx has heavy menstrual cycles. Need IV iron in past Hysterectomy Dec 2023.   Gets bad ice cravings when low.     Nutrition History:  NKFA  Not huge on raw vegetables     Saw Vanessa a couple months ago for first appt. Was unable to meet with me due to a work conflict.   Started phen/top at that time - going well. Has lost 18+ lbs since that time. Working on smaller portions. Significantly decreased pop intake. Does not taste as good since starting topiramate. Hx has loved diet mtn dew. Now doing mostly water or " sugar-free lemonade.     Reports her biggest issue is sugar - feels addicted. Trying to focus on protein and lower carb.     Pt goal for today: discuss protein snack ideas    PA: walking on treadmill, walking dogs    Additional information:  Nurse - computer based          No data to display                     No data to display                Nutrition Prescription:  Grams Protein: 50-60 (minimum)  Amount of Fluid: 48-64 oz    Nutrition Diagnosis  Obesity r/t long history of positive energy balance aeb BMI >30.    Intervention  Reviewed current dietary habits and pts history   Discussed long-term goals pt hopes to accomplish in RD appointments   Answered pt questions  Coordination of care - reviewed recent labs briefly  Nutrition education   AVS and handouts via [email or web address]    Expected Engagement: good    Resources:     Protein snack ideas:    - Cottage cheese   - Greek yogurt   - Low-fat string cheese   - Hard boiled eggs   - 100 kcal nut pack paired with something else    - Protein bar   - Protein shake    - Roasted chickpeas    - Deli meat   - Fruit with nut butter    - Protein ball (With protein powder or nut butter)     Could add Zucchini to smoothies as a way to get some vegetable in and make it creamier.       Pumpkin Pie Protein Shake   1 cup low-fat milk   1/3 cup pumpkin puree not pie filling   1 scoop vanilla protein powder   1 teaspoon granulated Stevia   1/8 teaspoon pumpkin spice   1 cup ice     In a  combine all ingredients. Blend until smooth.   Garnish if desired with a dash of pumpkin spice or cinnamon.     Eggnog Protein Shake   1 Vanilla Protein Shake   1 cup ice   1/2 tsp Rum extract   1/8 tsp Cinnamon   1/8 tsp Nutmeg     Combine vanilla protein shake and ice in a ahmadi. Add in rum extract and cinnamon. Blend shake until smooth. Sprinkle nutmeg on the top of your protein shake.    Chocolate Peppermint Protein Shake   1 Chocolate Protein Shake   1 cup ice    -   tsp peppermint extract      Combine chocolate protein shake and ice in a ahmadi. Add in peppermint extract (start with a   tsp peppermint extra can be an overpowering flavor). Blend shake until smooth.     Chocolate Cherokee Protein Shake  1 Chocolate Protein Shake   1 cup ice (optional)  1 tsp almond extract    Combine chocolate protein shake and ice in a ahmadi. Add in almond extract. Blend shake until smooth.     Apple Pie Protein Shake  1 Vanilla Protein Shake   1 cup ice (optional)  1/2 tsp apple pie spice     Combine vanilla protein shake and ice in a ahmadi. Add in apple pie spice. Blend shake until smooth.    Cake Batter Protein Shake     1  Vanilla Protein Shake    1 cup ice (optional)   3/4 tsp almond extract    Combine vanilla protein shake and ice in a ahmadi. Add in almond extract. Blend shake until smooth.      Blueberry Banana Smoothie with Avocado  Makes 1 serving    2 scoops Vanilla Protein Powder   1/3 Banana, peeled (35 kcals)  1/3 Avocado, peeled and pit removed (110 kcals)  1/3 cup Blueberry (28 kcals)  1 cup Water  1/3 cup Ice  Place all ingredients in the bowl of a  and purée until smooth.     Peanut Butter Sweet Potato Smoothie with Coconut and Jannie   Makes 1 serving    2 scoops Vanilla Protein Powder  1/3 cup Sweet Potato, roasted and peeled (38 kcals)  2 Tablespoons Peanut Butter (188 kcals)  1 teaspoon Fresh Jannie, peeled and grated  1 cups Water  1/3 cup Coconut, unsweetened flakes (94 kcals)  1/3 cup Ice    Place all ingredients in the bowl of a  and puree until smooth.     Strawberry Orange Smoothie with Spinach and Turmeric (210 kcals)  Makes 1 servings    2 scoops Vanilla Protein Pwder (160 kcals)  2/3 cups Strawberries (30 kcals)  1/3 whole Orange - peeled (20 kcals)  2/3 cups Spinach, fresh  2 teaspoons ground Flax Meal  1/3 teaspoon Turmeric, dry  1 cup Water  1/3 cup Ice  Place all ingredients in the bowl of a  and puree until smooth.    Peanut butter and Jelly Smoothie     2/3 cup  non-fat Greek yogurt  1 tbsp peanut butter  1 cup frozen mixed berries  1 cup water  Place all ingredients in the bowl of a  and puree until smooth.    Post Op General Handouts if ever needed    Supplements after Gastric Bypass  [email or web address]    Keeping Up Your Diet after Weight Loss Surgery  [email or web address]    Preventing Low Blood Sugar after Weight Loss Surgery  [email or web address]     Preventing Dumping Syndrome after Weight Loss Surgery  [email or web address]     Follow-Up:  PRN per pt    Time spent with patient: 24 minutes.  DARRYL Phillips, RD, LD

## 2024-05-17 ENCOUNTER — MYC MEDICAL ADVICE (OUTPATIENT)
Dept: OBGYN | Facility: CLINIC | Age: 49
End: 2024-05-17
Payer: COMMERCIAL

## 2024-05-17 NOTE — TELEPHONE ENCOUNTER
Mychart received from patient with c/o green vaginal discharge X weeks.     3/14/2024 pt sent mychart reporting green vaginal discharge X 1 week, provider tx with metrogel, by 3/18/2024 symptoms greatly improved.    States medication helped for 2-3 week.  Patient would like to be seen next week.   Currently scheduled for 1:15pm on Friday 5/24/2024.    Nicole MORRISON RN

## 2024-05-20 DIAGNOSIS — E66.01 CLASS 2 SEVERE OBESITY WITH SERIOUS COMORBIDITY AND BODY MASS INDEX (BMI) OF 37.0 TO 37.9 IN ADULT, UNSPECIFIED OBESITY TYPE (H): ICD-10-CM

## 2024-05-20 DIAGNOSIS — Z98.84 HISTORY OF GASTRIC BYPASS: ICD-10-CM

## 2024-05-20 DIAGNOSIS — E66.9 OBESITY WITH BODY MASS INDEX (BMI) OF 35.0 TO 39.9 WITHOUT COMORBIDITY: ICD-10-CM

## 2024-05-20 DIAGNOSIS — E66.812 CLASS 2 SEVERE OBESITY WITH SERIOUS COMORBIDITY AND BODY MASS INDEX (BMI) OF 37.0 TO 37.9 IN ADULT, UNSPECIFIED OBESITY TYPE (H): ICD-10-CM

## 2024-05-22 RX ORDER — PHENTERMINE HYDROCHLORIDE 37.5 MG/1
37.5 TABLET ORAL
Qty: 30 TABLET | Refills: 2 | Status: SHIPPED | OUTPATIENT
Start: 2024-05-22 | End: 2024-08-14 | Stop reason: SINTOL

## 2024-05-24 ENCOUNTER — OFFICE VISIT (OUTPATIENT)
Dept: OBGYN | Facility: CLINIC | Age: 49
End: 2024-05-24
Payer: COMMERCIAL

## 2024-05-24 VITALS — SYSTOLIC BLOOD PRESSURE: 123 MMHG | BODY MASS INDEX: 35.44 KG/M2 | DIASTOLIC BLOOD PRESSURE: 84 MMHG | WEIGHT: 233 LBS

## 2024-05-24 DIAGNOSIS — N89.8 VAGINAL DISCHARGE: Primary | ICD-10-CM

## 2024-05-24 LAB
CLUE CELLS: ABNORMAL
TRICHOMONAS, WET PREP: ABNORMAL
WBC'S/HIGH POWER FIELD, WET PREP: ABNORMAL
YEAST, WET PREP: PRESENT

## 2024-05-24 PROCEDURE — 99213 OFFICE O/P EST LOW 20 MIN: CPT | Performed by: OBSTETRICS & GYNECOLOGY

## 2024-05-24 PROCEDURE — 87210 SMEAR WET MOUNT SALINE/INK: CPT | Performed by: OBSTETRICS & GYNECOLOGY

## 2024-05-24 RX ORDER — FLUCONAZOLE 150 MG/1
150 TABLET ORAL
Qty: 3 TABLET | Refills: 0 | Status: SHIPPED | OUTPATIENT
Start: 2024-05-24 | End: 2024-05-31

## 2024-05-24 RX ORDER — METRONIDAZOLE 7.5 MG/G
1 GEL VAGINAL DAILY
Qty: 25 G | Refills: 0 | Status: SHIPPED | OUTPATIENT
Start: 2024-05-24 | End: 2024-05-29

## 2024-05-24 NOTE — PATIENT INSTRUCTIONS
You may follow treatment with vaginal boric acid over the counter per package instruction, one vaginal insert nightly for up to three weeks and then as needed.               If you have labs or imaging done, the results will automatically release in [facility] without an interpretation.  Your health care professional will review those results and send an interpretation with recommendations as soon as possible, but this may be 1-3 business days.    If you have any questions regarding your visit, please contact your care team.     [facility]: [phone]  Women s Health CLINIC HOURS TELEPHONE NUMBER       MD Brigette May - Certified Medical Assistant     Kalee- LEAD RN  Olga-MEDARDO Rivera-MEDARDO Nash-  Brandie-     Monday- [place]  8:00 a.m - 5:00 p.m    Tuesday- Surgery        [facility]  8:00 a.m - 5:00 p.m.    Friday- [place]  7:30 a.m - 4:00 p.m. [facility]  [address] [place] [address]  [phone] Fax  [phone] Phone  Imaging Scheduling [phone]    [facility] Labor [facility] Delivery  [address] [place] [address] [address].  [place], [place] [address] [phone]    [address] [address] [phone] Phone  [phone] Fax  Imaging Scheduling [phone]     Urgent Care locations:  [facility] Monday-Friday  10 am - 8 pm  Saturday and Sunday   9 am - 5 pm  Monday-Friday   10 am - 8 pm  Saturday and Sunday   9 am - 5 pm   [phone]     **Surgeries** Our Surgery Schedulers will contact you to schedule. If you do not receive a call within 3 business days, please call [phone].    If you need a medication refill, please contact your pharmacy. Please allow 3 business days for your refill to be completed.    As always, thank you for trusting us with your healthcare needs!

## 2024-05-24 NOTE — PROGRESS NOTES
OB/GYN      NAME:  Chayo Nathan  PCP:  Shawna Garcia  MRN:  [id number]    Impression / Plan     49 year old  with:      ICD-10-CM    1. Vaginal discharge  N89.8 Wet prep - Clinic Collect     metroNIDAZOLE (METROGEL) 0.75 % vaginal gel          Exam consistent with bacterial vaginosis.  Discussed management options and will treat with metrogel followed by vaginal boric acid.  She will reach out if her symptoms recur or do not     Chief Complaint     Chief Complaint   Patient presents with    Vaginal Problem       HPI     Chayo Nathan is a  49 year old female who is seen for vaginal discharge.   She had a TLH and removal of tubal remnants for abnormal uterine bleeding.   She was treated for BV in March and did well for a few weeks, but it recurred this past month and is now associated with some pelvic pain.  Discharge is green/yellow and has an odor.     Patient's last menstrual period was 2023 (approximate).       Problem List     Patient Active Problem List    Diagnosis Date Noted    Class 2 severe obesity with serious comorbidity and body mass index (BMI) of 37.0 to 37.9 in adult, unspecified obesity type (H) 2024     Priority: Medium    History of gastric bypass 2024     Priority: Medium    Obesity with body mass index (BMI) of 35.0 to 39.9 without comorbidity 2023     Priority: Medium    Multiple sclerosis (H) 2019     Priority: Medium       Medications     Current Outpatient Medications   Medication Sig Dispense Refill    ISOtretinoin (ABSORICA) 30 MG capsule TAKE 2 CAPSULES BY MOUTH DAILY WITH FATTY MEAL      multivitamin w/minerals (THERA-VIT-M) tablet Take 1 tablet by mouth daily      ocrelizumab (OCREVUS) 300 MG/10ML SOLN injection Every six months      ondansetron (ZOFRAN) 4 MG tablet Take one tablet every six hours for nausea during colonoscopy bowel prepping 3 tablet 0    phentermine (ADIPEX-P) 37.5 MG tablet Take 1 tablet (37.5 mg) by mouth every  morning (before breakfast) 30 tablet 2    topiramate (TOPAMAX) 25 MG tablet 50mg (2 tablets) in the morning and 50mg (2 tablets) at night for a total of 100mg each day. 120 tablet 3    vitamin B-12 (CYANOCOBALAMIN) 2500 MCG sublingual tablet Take 2,500 mcg by mouth daily      vitamin C (ASCORBIC ACID) 1000 MG TABS Take 1,000 mg by mouth daily      vitamin D3 (CHOLECALCIFEROL) 50847 units (250 mcg) capsule Take one tablet every other day 15 capsule 11     Current Facility-Administered Medications   Medication Dose Route Frequency Provider Last Rate Last Admin    lidocaine 1% with EPINEPHrine 1:100,000 injection 3 mL  3 mL Intradermal Once Pablo Jenkins MD            Allergies     Allergies   Allergen Reactions    Bees Anaphylaxis    Ferumoxytol Difficulty breathing    Penicillin G Rash    Sulfa Antibiotics Rash       ROS     Pertinent positives and negatives are listed in the HPI.     Physical Exam   Vitals: /84   Wt 105.7 kg (233 lb)   LMP 08/28/2023 (Approximate)   BMI 35.44 kg/m      General: Comfortable, no obvious distress   Psych: Alert and orientated x 3. Appropriate affect, good insight.   : Normal female external genitalia.  No lesions.  Urethral meatus normal.  Speculum exam reveals a normal vaginal vault, normal cuff.  Greenish yellow discharge, moderate amount, with features consistent with BV.              Giuliana Zarate MD

## 2024-06-11 NOTE — PROGRESS NOTES
Referral source: Established patient    Chief complaint: Multiple sclerosis    History of the Present Illness: Ms. Chayo Nathan is a 49 year old right-handed woman who presents to the Multiple Sclerosis Clinic today for a scheduled follow up visit regarding her diagnosis of multiple sclerosis.    The patient's history is as per my previous notes.  Initial onset of symptoms of demyelinating disease was about 11 years ago when she noted a dysesthetic, burning sensation in the legs.  Somewhat later, she had an episode likely representing optic neuritis, by history.  Diagnosis of MS was formally confirmed in 2018 after investigations performed when she developed a new left foot drop.  She was initially treated with glatiramer acetate, with therapy later being changed to natalizumab.  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  The last treatment was performed on 12/21/2023.    Today, she denies any new episodic changes in vision, balance, strength or sensation suggestive of new relapse of multiple sclerosis since she was last seen.    Symptomatically, she relates that her balance is slightly off.  She did have one fall caused by tripping over her left foot last summer when she was walking her dogs.  She notes that she needs to actively think about lifting her left foot when she is walking them.    She also notes intermittent neuropathic discomfort in the left foot.    PHYSICAL EXAMINATION:  VITAL SIGNS: Blood pressure 116/79; pulse 73; weight 105.7 kg; height 1.73 m.  GENERAL: Overweight woman who presents to the examination alone, awake and alert and in no acute distress.    NEUROLOGIC EXAMINATION:  CRANIAL NERVES: Visual fields are full to confrontation.  Extraocular movements are intact with no internuclear ophthalmoplegia.  Facial strength is normal.  Palate elevation and tongue protrusion are normal.  POWER: Strength is within normal limits in proximal and distal  muscles in the upper and lower limbs throughout.  REFLEXES: Reflexes are symmetric and within normal limits in the arms and legs.  MOTOR/CEREBELLAR: There is no appendicular ataxia on finger-to-nose testing.  Rapid alternating movements are within normal limits in the hands and fingers.  There is no pronator drift in the arms.  GAIT: The patient is able to ambulate on a flat, level surface with no gross loss of postural stability, and able to walk on heels, toes and in tandem.    Investigations: I reviewed the results of MRI scans of the brain and cervical spine performed earlier on 10/4/2023.  These were stable with no evidence of interval or active demyelination.    Assessment/plan:    1. Multiple sclerosis  The patient is clinically and radiologically stable as regards any evidence of active inflammatory demyelination on current disease modifying therapy with ocrelizumab, which she will continue, with the next infusion due to be performed on or about 6/21/2024.    I will see her back in 7 months for a review with MRI scans of the brain and cervical spine to be performed prior to that appointment for ongoing monitoring of the radiologic stability of her condition.  If those scans continue to appear stable, we can lengthen the imaging interval somewhat from there.    2. Gait disturbance  At present, she is compensating reasonably well for her gait difficulties.  I counseled her on measures to reduce her risk of falls and associated injury from same, including using railings at all times when going up or down stairs and exercising particular caution in winter weather conditions.    The longitudinal plan of care for the diagnoses as documented were addressed during this visit. Due to the added complexity in care, I will continue to support the patient in subsequent management and with ongoing continuity of care.

## 2024-06-21 ENCOUNTER — PATIENT OUTREACH (OUTPATIENT)
Dept: CARE COORDINATION | Facility: CLINIC | Age: 49
End: 2024-06-21
Payer: COMMERCIAL

## 2024-06-28 ENCOUNTER — INFUSION THERAPY VISIT (OUTPATIENT)
Dept: INFUSION THERAPY | Facility: CLINIC | Age: 49
End: 2024-06-28
Attending: PSYCHIATRY & NEUROLOGY
Payer: COMMERCIAL

## 2024-06-28 VITALS
OXYGEN SATURATION: 99 % | WEIGHT: 218.6 LBS | DIASTOLIC BLOOD PRESSURE: 81 MMHG | SYSTOLIC BLOOD PRESSURE: 166 MMHG | TEMPERATURE: 98.1 F | HEART RATE: 74 BPM | BODY MASS INDEX: 33.25 KG/M2 | RESPIRATION RATE: 16 BRPM

## 2024-06-28 DIAGNOSIS — G35 MULTIPLE SCLEROSIS (H): Primary | ICD-10-CM

## 2024-06-28 PROCEDURE — 250N000011 HC RX IP 250 OP 636: Performed by: PSYCHIATRY & NEUROLOGY

## 2024-06-28 PROCEDURE — 250N000013 HC RX MED GY IP 250 OP 250 PS 637: Performed by: PSYCHIATRY & NEUROLOGY

## 2024-06-28 PROCEDURE — 96365 THER/PROPH/DIAG IV INF INIT: CPT

## 2024-06-28 PROCEDURE — 99207 PR NO CHARGE LOS: CPT

## 2024-06-28 PROCEDURE — 96375 TX/PRO/DX INJ NEW DRUG ADDON: CPT

## 2024-06-28 PROCEDURE — 96366 THER/PROPH/DIAG IV INF ADDON: CPT

## 2024-06-28 PROCEDURE — 258N000003 HC RX IP 258 OP 636: Performed by: PSYCHIATRY & NEUROLOGY

## 2024-06-28 RX ORDER — METHYLPREDNISOLONE SODIUM SUCCINATE 125 MG/2ML
125 INJECTION, POWDER, LYOPHILIZED, FOR SOLUTION INTRAMUSCULAR; INTRAVENOUS ONCE
OUTPATIENT
Start: 2024-12-25

## 2024-06-28 RX ORDER — ACETAMINOPHEN 325 MG/1
650 TABLET ORAL ONCE
Status: COMPLETED | OUTPATIENT
Start: 2024-06-28 | End: 2024-06-28

## 2024-06-28 RX ORDER — MEPERIDINE HYDROCHLORIDE 25 MG/ML
25 INJECTION INTRAMUSCULAR; INTRAVENOUS; SUBCUTANEOUS EVERY 30 MIN PRN
OUTPATIENT
Start: 2024-12-25

## 2024-06-28 RX ORDER — METHYLPREDNISOLONE SODIUM SUCCINATE 125 MG/2ML
125 INJECTION, POWDER, LYOPHILIZED, FOR SOLUTION INTRAMUSCULAR; INTRAVENOUS ONCE
Status: COMPLETED | OUTPATIENT
Start: 2024-06-28 | End: 2024-06-28

## 2024-06-28 RX ORDER — ALBUTEROL SULFATE 0.83 MG/ML
2.5 SOLUTION RESPIRATORY (INHALATION)
OUTPATIENT
Start: 2024-12-25

## 2024-06-28 RX ORDER — METHYLPREDNISOLONE SODIUM SUCCINATE 125 MG/2ML
125 INJECTION, POWDER, LYOPHILIZED, FOR SOLUTION INTRAMUSCULAR; INTRAVENOUS
Start: 2024-12-25

## 2024-06-28 RX ORDER — ALBUTEROL SULFATE 90 UG/1
1-2 AEROSOL, METERED RESPIRATORY (INHALATION)
Start: 2024-12-25

## 2024-06-28 RX ORDER — DIPHENHYDRAMINE HCL 25 MG
50 CAPSULE ORAL ONCE
Status: COMPLETED | OUTPATIENT
Start: 2024-06-28 | End: 2024-06-28

## 2024-06-28 RX ORDER — ACETAMINOPHEN 325 MG/1
650 TABLET ORAL ONCE
OUTPATIENT
Start: 2024-12-25

## 2024-06-28 RX ORDER — DIPHENHYDRAMINE HCL 25 MG
50 CAPSULE ORAL ONCE
OUTPATIENT
Start: 2024-12-25

## 2024-06-28 RX ORDER — EPINEPHRINE 1 MG/ML
0.3 INJECTION, SOLUTION INTRAMUSCULAR; SUBCUTANEOUS EVERY 5 MIN PRN
OUTPATIENT
Start: 2024-12-25

## 2024-06-28 RX ORDER — DIPHENHYDRAMINE HYDROCHLORIDE 50 MG/ML
50 INJECTION INTRAMUSCULAR; INTRAVENOUS
Start: 2024-12-25

## 2024-06-28 RX ADMIN — METHYLPREDNISOLONE SODIUM SUCCINATE 125 MG: 125 INJECTION, POWDER, FOR SOLUTION INTRAMUSCULAR; INTRAVENOUS at 10:08

## 2024-06-28 RX ADMIN — DIPHENHYDRAMINE HYDROCHLORIDE 50 MG: 25 CAPSULE ORAL at 09:56

## 2024-06-28 RX ADMIN — OCRELIZUMAB 600 MG: 300 INJECTION INTRAVENOUS at 10:10

## 2024-06-28 RX ADMIN — SODIUM CHLORIDE 250 ML: 9 INJECTION, SOLUTION INTRAVENOUS at 10:07

## 2024-06-28 RX ADMIN — ACETAMINOPHEN 650 MG: 325 TABLET ORAL at 09:56

## 2024-06-28 NOTE — PROGRESS NOTES
"Infusion Nursing Note:  Chayo Nathan presents today for Ocrevus.    Patient seen by provider today: No   present during visit today: Not Applicable.    Note: Patient reports her whole family has been sick with Covid, states her symptoms started last Thursday but tested positive on Sunday. She reports it \"was a very bad cold\" but never experienced fevers. Today, she feels back at her baseline. Page sent to Dr. Love who stated it was OK to proceed with infusion today.    Intravenous Access:  Peripheral IV placed.    Treatment Conditions:  Biological Infusion Checklist:  ~~~ NOTE: If the patient answers yes to any of the questions below, hold the infusion and contact ordering provider or on-call provider.    Have you recently had an elevated temperature, fever, chills, productive cough, coughing for 3 weeks or longer or hemoptysis,  abnormal vital signs, night sweats,  chest pain or have you noticed a decrease in your appetite, unexplained weight loss or fatigue? Yes, productive cough, Covid + on Sunday; approx. 8 days ago cold symptoms started, no fevers  Do you have any open wounds or new incisions? No  Do you have any upcoming hospitalizations or surgeries? Does not include esophagogastroduodenoscopy, colonoscopy, endoscopic retrograde cholangiopancreatography (ERCP), endoscopic ultrasound (EUS), dental procedures or joint aspiration/steroid injections No  Do you currently have any signs of illness or infection or are you on any antibiotics? Yes, symptoms began to resolve by Tuesday but tested positive for Covid on Sunday  Have you had any new, sudden or worsening abdominal pain? No  Have you or anyone in your household received a live vaccination in the past 4 weeks? Please note: No live vaccines while on biologic/chemotherapy until 6 months after the last treatment. Patient can receive the flu vaccine (shot only), pneumovax and the Covid vaccine. It is optimal for the patient to get these " vaccines mid cycle, but they can be given at any time as long as it is not on the day of the infusion. No  Have you recently been diagnosed with any new nervous system diseases (ie. Multiple sclerosis, Guillain Flagler, seizures, neurological changes) or cancer diagnosis? Are you on any form of radiation or chemotherapy? No  Are you pregnant or breast feeding or do you have plans of pregnancy in the future? No  Have you been having any signs of worsening depression or suicidal ideations?  (benlysta only) No  Have there been any other new onset medical symptoms? No  Have you had any new blood clots? (IVIG only) No    Post Infusion Assessment:  Patient tolerated infusion without incident.  Patient observed for 1 hour post infusion per protocol.  Site patent and intact, free from redness, edema or discomfort.  No evidence of extravasations.  Access discontinued per protocol.  Biologic Infusion Post Education: Call the triage nurse at your clinic or seek medical attention if you have chills and/or temperature greater than or equal to 100.5, uncontrolled nausea/vomiting, diarrhea, constipation, dizziness, shortness of breath, chest pain, heart palpitations, weakness or any other new or concerning symptoms, questions or concerns.  You cannot have any live virus vaccines prior to or during treatment or up to 6 months post infusion.  If you have an upcoming surgery, medical procedure or dental procedure during treatment, this should be discussed with your ordering physician and your surgeon/dentist.  If you are having any concerning symptom, if you are unsure if you should get your next infusion or wish to speak to a provider before your next infusion, please call your care coordinator or triage nurse at your clinic to notify them so we can adequately serve you.     Discharge Plan:   Future appts have been reviewed and crosschecked with appt note and plan.  AVS to patient via [facility].  Patient will return in 6 months for next  appointment.   Patient discharged in stable condition accompanied by: self.  Departure Mode: Ambulatory.      Narda Rodriguez RN BSN OCN

## 2024-07-05 ENCOUNTER — PATIENT OUTREACH (OUTPATIENT)
Dept: CARE COORDINATION | Facility: CLINIC | Age: 49
End: 2024-07-05
Payer: COMMERCIAL

## 2024-07-16 ENCOUNTER — APPOINTMENT (OUTPATIENT)
Dept: GENERAL RADIOLOGY | Facility: CLINIC | Age: 49
End: 2024-07-16
Attending: EMERGENCY MEDICINE
Payer: COMMERCIAL

## 2024-07-16 ENCOUNTER — HOSPITAL ENCOUNTER (EMERGENCY)
Facility: CLINIC | Age: 49
Discharge: HOME | End: 2024-07-16
Attending: EMERGENCY MEDICINE | Admitting: EMERGENCY MEDICINE
Payer: COMMERCIAL

## 2024-07-16 VITALS
TEMPERATURE: 97.9 F | DIASTOLIC BLOOD PRESSURE: 82 MMHG | HEIGHT: 68 IN | HEART RATE: 60 BPM | SYSTOLIC BLOOD PRESSURE: 135 MMHG | BODY MASS INDEX: 33.24 KG/M2 | RESPIRATION RATE: 14 BRPM | OXYGEN SATURATION: 98 %

## 2024-07-16 DIAGNOSIS — R07.9 CHEST PAIN, UNSPECIFIED TYPE: ICD-10-CM

## 2024-07-16 DIAGNOSIS — R00.2 PALPITATIONS: ICD-10-CM

## 2024-07-16 LAB
ALBUMIN SERPL BCG-MCNC: 4.3 G/DL (ref 3.5–5.2)
ALP SERPL-CCNC: 102 U/L (ref 40–150)
ALT SERPL W P-5'-P-CCNC: 9 U/L (ref 0–50)
ANION GAP SERPL CALCULATED.3IONS-SCNC: 12 MMOL/L (ref 7–15)
AST SERPL W P-5'-P-CCNC: 20 U/L (ref 0–45)
ATRIAL RATE - MUSE: 101 BPM
BASOPHILS # BLD AUTO: 0.1 10E3/UL (ref 0–0.2)
BASOPHILS NFR BLD AUTO: 1 %
BILIRUB SERPL-MCNC: 0.2 MG/DL
BUN SERPL-MCNC: 12.9 MG/DL (ref 6–20)
CALCIUM SERPL-MCNC: 9 MG/DL (ref 8.8–10.4)
CHLORIDE SERPL-SCNC: 107 MMOL/L (ref 98–107)
CREAT SERPL-MCNC: 0.77 MG/DL (ref 0.51–0.95)
D DIMER PPP FEU-MCNC: 0.27 UG/ML FEU (ref 0–0.5)
DIASTOLIC BLOOD PRESSURE - MUSE: NORMAL MMHG
EGFRCR SERPLBLD CKD-EPI 2021: >90 ML/MIN/1.73M2
EOSINOPHIL # BLD AUTO: 0.1 10E3/UL (ref 0–0.7)
EOSINOPHIL NFR BLD AUTO: 1 %
ERYTHROCYTE [DISTWIDTH] IN BLOOD BY AUTOMATED COUNT: 15.7 % (ref 10–15)
GLUCOSE SERPL-MCNC: 74 MG/DL (ref 70–99)
HCG SERPL QL: NEGATIVE
HCO3 SERPL-SCNC: 19 MMOL/L (ref 22–29)
HCT VFR BLD AUTO: 35.6 % (ref 35–47)
HGB BLD-MCNC: 11.2 G/DL (ref 11.7–15.7)
IMM GRANULOCYTES # BLD: 0 10E3/UL
IMM GRANULOCYTES NFR BLD: 1 %
INTERPRETATION ECG - MUSE: NORMAL
LIPASE SERPL-CCNC: 28 U/L (ref 13–60)
LYMPHOCYTES # BLD AUTO: 2 10E3/UL (ref 0.8–5.3)
LYMPHOCYTES NFR BLD AUTO: 22 %
MAGNESIUM SERPL-MCNC: 2.2 MG/DL (ref 1.7–2.3)
MCH RBC QN AUTO: 23.7 PG (ref 26.5–33)
MCHC RBC AUTO-ENTMCNC: 31.5 G/DL (ref 31.5–36.5)
MCV RBC AUTO: 75 FL (ref 78–100)
MONOCYTES # BLD AUTO: 1.1 10E3/UL (ref 0–1.3)
MONOCYTES NFR BLD AUTO: 13 %
NEUTROPHILS # BLD AUTO: 5.4 10E3/UL (ref 1.6–8.3)
NEUTROPHILS NFR BLD AUTO: 62 %
NRBC # BLD AUTO: 0 10E3/UL
NRBC BLD AUTO-RTO: 0 /100
P AXIS - MUSE: 54 DEGREES
PLATELET # BLD AUTO: 409 10E3/UL (ref 150–450)
POTASSIUM SERPL-SCNC: 3.6 MMOL/L (ref 3.4–5.3)
PR INTERVAL - MUSE: 160 MS
PROT SERPL-MCNC: 7 G/DL (ref 6.4–8.3)
QRS DURATION - MUSE: 88 MS
QT - MUSE: 356 MS
QTC - MUSE: 461 MS
R AXIS - MUSE: 34 DEGREES
RBC # BLD AUTO: 4.72 10E6/UL (ref 3.8–5.2)
SODIUM SERPL-SCNC: 138 MMOL/L (ref 135–145)
SYSTOLIC BLOOD PRESSURE - MUSE: NORMAL MMHG
T AXIS - MUSE: 35 DEGREES
TROPONIN T SERPL HS-MCNC: <6 NG/L
TSH SERPL DL<=0.005 MIU/L-ACNC: 1.97 UIU/ML (ref 0.3–4.2)
VENTRICULAR RATE- MUSE: 101 BPM
WBC # BLD AUTO: 8.7 10E3/UL (ref 4–11)

## 2024-07-16 PROCEDURE — 80053 COMPREHEN METABOLIC PANEL: CPT | Performed by: EMERGENCY MEDICINE

## 2024-07-16 PROCEDURE — 99285 EMERGENCY DEPT VISIT HI MDM: CPT | Performed by: EMERGENCY MEDICINE

## 2024-07-16 PROCEDURE — 84703 CHORIONIC GONADOTROPIN ASSAY: CPT | Performed by: EMERGENCY MEDICINE

## 2024-07-16 PROCEDURE — 99285 EMERGENCY DEPT VISIT HI MDM: CPT | Mod: 25 | Performed by: EMERGENCY MEDICINE

## 2024-07-16 PROCEDURE — 93010 ELECTROCARDIOGRAM REPORT: CPT | Performed by: EMERGENCY MEDICINE

## 2024-07-16 PROCEDURE — 83690 ASSAY OF LIPASE: CPT | Performed by: EMERGENCY MEDICINE

## 2024-07-16 PROCEDURE — 258N000003 HC RX IP 258 OP 636: Performed by: EMERGENCY MEDICINE

## 2024-07-16 PROCEDURE — 93005 ELECTROCARDIOGRAM TRACING: CPT | Performed by: EMERGENCY MEDICINE

## 2024-07-16 PROCEDURE — 84484 ASSAY OF TROPONIN QUANT: CPT | Performed by: EMERGENCY MEDICINE

## 2024-07-16 PROCEDURE — 84443 ASSAY THYROID STIM HORMONE: CPT | Performed by: EMERGENCY MEDICINE

## 2024-07-16 PROCEDURE — 83735 ASSAY OF MAGNESIUM: CPT | Performed by: EMERGENCY MEDICINE

## 2024-07-16 PROCEDURE — 85379 FIBRIN DEGRADATION QUANT: CPT | Performed by: EMERGENCY MEDICINE

## 2024-07-16 PROCEDURE — 250N000009 HC RX 250: Performed by: EMERGENCY MEDICINE

## 2024-07-16 PROCEDURE — 71046 X-RAY EXAM CHEST 2 VIEWS: CPT | Mod: 26 | Performed by: RADIOLOGY

## 2024-07-16 PROCEDURE — 96360 HYDRATION IV INFUSION INIT: CPT | Performed by: EMERGENCY MEDICINE

## 2024-07-16 PROCEDURE — 250N000013 HC RX MED GY IP 250 OP 250 PS 637: Performed by: EMERGENCY MEDICINE

## 2024-07-16 PROCEDURE — 71046 X-RAY EXAM CHEST 2 VIEWS: CPT

## 2024-07-16 PROCEDURE — 36415 COLL VENOUS BLD VENIPUNCTURE: CPT | Performed by: EMERGENCY MEDICINE

## 2024-07-16 PROCEDURE — 85041 AUTOMATED RBC COUNT: CPT | Performed by: EMERGENCY MEDICINE

## 2024-07-16 RX ORDER — MAGNESIUM HYDROXIDE/ALUMINUM HYDROXICE/SIMETHICONE 120; 1200; 1200 MG/30ML; MG/30ML; MG/30ML
15 SUSPENSION ORAL ONCE
Status: COMPLETED | OUTPATIENT
Start: 2024-07-16 | End: 2024-07-16

## 2024-07-16 RX ORDER — LIDOCAINE HYDROCHLORIDE 20 MG/ML
10 SOLUTION OROPHARYNGEAL ONCE
Status: COMPLETED | OUTPATIENT
Start: 2024-07-16 | End: 2024-07-16

## 2024-07-16 RX ADMIN — SODIUM CHLORIDE 500 ML: 9 INJECTION, SOLUTION INTRAVENOUS at 15:21

## 2024-07-16 RX ADMIN — LIDOCAINE HYDROCHLORIDE 10 ML: 20 SOLUTION OROPHARYNGEAL at 15:19

## 2024-07-16 RX ADMIN — ALUMINUM HYDROXIDE, MAGNESIUM HYDROXIDE, AND SIMETHICONE 15 ML: 200; 200; 20 SUSPENSION ORAL at 15:19

## 2024-07-16 ASSESSMENT — COLUMBIA-SUICIDE SEVERITY RATING SCALE - C-SSRS
2. HAVE YOU ACTUALLY HAD ANY THOUGHTS OF KILLING YOURSELF IN THE PAST MONTH?: NO
1. IN THE PAST MONTH, HAVE YOU WISHED YOU WERE DEAD OR WISHED YOU COULD GO TO SLEEP AND NOT WAKE UP?: NO
6. HAVE YOU EVER DONE ANYTHING, STARTED TO DO ANYTHING, OR PREPARED TO DO ANYTHING TO END YOUR LIFE?: NO

## 2024-07-16 ASSESSMENT — ACTIVITIES OF DAILY LIVING (ADL)
ADLS_ACUITY_SCORE: 35
ADLS_ACUITY_SCORE: 35
ADLS_ACUITY_SCORE: 33
ADLS_ACUITY_SCORE: 35

## 2024-07-16 NOTE — DISCHARGE INSTRUCTIONS
You have been seen in the emergency department today for your symptoms.  Your EKG does not show any sign of heart attack.  All of your labs look good, your heart enzyme test, test for blood clot, pancreas enzyme, kidney function test, electrolytes, are all normal, as is your chest x-ray.    Your symptoms may be related to your recent COVID infection and ongoing inflammation.  You also could have heartburn/reflux.  You can try antacids which may be helpful for you.  If you continue to notice palpitations, we advise that you follow-up with your primary clinic for consideration of outpatient Zio patch.    If you have recurrent severe chest pain that does not go away or new or worsening symptoms, feel free to return to the emergency department for reassessment.

## 2024-07-16 NOTE — ED PROVIDER NOTES
[place] EMERGENCY DEPARTMENT ([facility])    24       ED PROVIDER NOTE     History     Chief Complaint   Patient presents with    Chest Pain     The history is provided by the patient and medical records.     Chayo Nathan is a 49 year old female with a history of multiple sclerosis who presents to the emergency department today with chest pain.  Patient works at [employer] as a nurse.  Patient states when she woke up this morning she sat up and immediately felt right sided chest pain going to her right shoulder and right neck.  This was sharp and severe and very unusual for her.  She has noted that it seems to get worse when she sits up or sits forward, and is alleviated somewhat by lying down.  She is short of breath with this, she has felt some palpitations which she believes are PVCs.  She does have an Apple Watch that did tell her that she was slightly tachycardic but that she was not in atrial fibrillation.  She has had some dizziness and nausea as well.  She denies any abdominal pain or vomiting.  She did go to work today, but symptoms worsened throughout the day and she decided to come to the emergency department to get checked out.  States the symptoms are not necessarily exertional.  Patient also notes that she has felt very emotional lately and has been crying much of the day which is very unusual for her.    She did have COVID a few weeks ago.  She has recovered from this.    No personal history of thromboembolic disease.  She does have baseline lower extremity swelling which has not changed for her.    She has a history of gastric bypass, , and cholecystectomy.     Past Medical History:   Diagnosis Date    History of basal cell carcinoma        Past Surgical History:   Procedure Laterality Date     SECTION      CHOLECYSTECTOMY      COLONOSCOPY WITH CO2 INSUFFLATION N/A 2024    Procedure: Colonoscopy with CO2 insufflation;  Surgeon: Yeni Cesar DO;   Location: [facility] OR    IR LUMBAR PUNCTURE  3/8/2018    LAPAROSCOPIC HYSTERECTOMY TOTAL, SALPINGECTOMY BILATERAL  2023    removal of tubal remnants    THYROIDECTOMY Right     TUBAL LIGATION         Family History   Problem Relation Age of Onset    No Known Problems Mother     Basal cell carcinoma Father     Dementia Father     Other - See Comments Father         wheel chair bound    Kidney Disease Father     Cancer Father         charcot's    Melanoma Sister     Obesity Sister     Diabetes Maternal Grandfather     Colon Cancer No family hx of     Breast Cancer No family hx of     Coronary Artery Disease No family hx of        Social History     Tobacco Use    Smoking status: Never    Smokeless tobacco: Never   Substance Use Topics    Alcohol use: Yes     Comment: socially           Past Medical History  Past Medical History:   Diagnosis Date    History of basal cell carcinoma      Past Surgical History:   Procedure Laterality Date     SECTION      CHOLECYSTECTOMY      COLONOSCOPY WITH CO2 INSUFFLATION N/A 2024    Procedure: Colonoscopy with CO2 insufflation;  Surgeon: Yeni Cesar DO;  Location: [facility] OR    IR LUMBAR PUNCTURE  3/8/2018    LAPAROSCOPIC HYSTERECTOMY TOTAL, SALPINGECTOMY BILATERAL  2023    removal of tubal remnants    THYROIDECTOMY Right     TUBAL LIGATION       ISOtretinoin (ABSORICA) 30 MG capsule  multivitamin w/minerals (THERA-VIT-M) tablet  ocrelizumab (OCREVUS) 300 MG/10ML SOLN injection  ondansetron (ZOFRAN) 4 MG tablet  phentermine (ADIPEX-P) 37.5 MG tablet  topiramate (TOPAMAX) 25 MG tablet  vitamin B-12 (CYANOCOBALAMIN) 2500 MCG sublingual tablet  vitamin C (ASCORBIC ACID) 1000 MG TABS  vitamin D3 (CHOLECALCIFEROL) 96858 units (250 mcg) capsule      Allergies   Allergen Reactions    Bees Anaphylaxis    Ferumoxytol Difficulty breathing    Penicillin G Rash    Sulfa Antibiotics Rash     Family History  Family History   Problem Relation Age of Onset    No Known Problems  "Mother     Basal cell carcinoma Father     Dementia Father     Other - See Comments Father         wheel chair bound    Kidney Disease Father     Cancer Father         charcot's    Melanoma Sister     Obesity Sister     Diabetes Maternal Grandfather     Colon Cancer No family hx of     Breast Cancer No family hx of     Coronary Artery Disease No family hx of      Social History   Social History     Tobacco Use    Smoking status: Never    Smokeless tobacco: Never   Vaping Use    Vaping status: Never Used   Substance Use Topics    Alcohol use: Yes     Comment: socially    Drug use: Never      A medically appropriate review of systems was performed with pertinent positives and negatives noted in the HPI, and all other systems negative.    Physical Exam   BP: (!) 148/92  Pulse: 119  Temp: 97.9  F (36.6  C)  Resp: 16  Height: 172.7 cm (5' 8\")  SpO2: 98 %  Physical Exam  Vitals and nursing note reviewed.   Constitutional:       Appearance: She is not diaphoretic.      Comments: Adult female, lying back in chair, tearful, upset, distressed   HENT:      Head: Atraumatic.      Mouth/Throat:      Mouth: Mucous membranes are moist.      Pharynx: Oropharynx is clear. No oropharyngeal exudate.   Eyes:      General: No scleral icterus.     Pupils: Pupils are equal, round, and reactive to light.   Cardiovascular:      Rate and Rhythm: Tachycardia present.      Pulses: Normal pulses.      Heart sounds: Normal heart sounds. No murmur heard.     Comments: Tachycardic.  No murmur appreciated.  Pulmonary:      Effort: Pulmonary effort is normal. No respiratory distress.      Breath sounds: Normal breath sounds. No wheezing or rhonchi.   Abdominal:      General: Bowel sounds are normal.      Palpations: Abdomen is soft.      Tenderness: There is no abdominal tenderness. There is no guarding or rebound.   Musculoskeletal:         General: No tenderness.      Right lower leg: Edema present.      Left lower leg: Edema present.      " Comments: 2+ bilateral lower extremity edema, no tenderness to palpation   Skin:     General: Skin is warm.      Findings: No rash.   Neurological:      Mental Status: She is alert.   Psychiatric:      Comments: Tearful         ED Course, Procedures, & Data      Procedures            EKG Interpretation:      Interpreted by Camryn Edmonds MD  Time reviewed:1515   Symptoms at time of EKG: R sided chest pain   Rhythm: Sinus tachycardia  Rate: 100-110  Axis: Normal  Ectopy: None  Conduction: Normal  ST Segments/ T Waves: No ST segment elevation or depression.  T wave inversion noted in aVR, V1  Q Waves: None  Comparison to prior: No old EKG available    Clinical Impression: non-specific EKG            Results for orders placed or performed during the hospital encounter of 07/16/24   Chest XR,  PA & LAT     Status: None    Narrative    EXAM: XR CHEST 2 VIEWS  7/16/2024 4:17 PM      HISTORY: SOB, chest pain    COMPARISON: None    FINDINGS: Two views of the chest. Trachea is midline. Normal  cardiomediastinal silhouette. No pleural effusion, consolidation or  pneumothorax. No acute osseous abnormalities.      Impression    IMPRESSION: No acute cardiopulmonary findings.    I have personally reviewed the examination and initial interpretation  and I agree with the findings.    ELAINA GARCÍA MD         SYSTEM ID:  [id number]   Comprehensive metabolic panel     Status: Abnormal   Result Value Ref Range    Sodium 138 135 - 145 mmol/L    Potassium 3.6 3.4 - 5.3 mmol/L    Carbon Dioxide (CO2) 19 (L) 22 - 29 mmol/L    Anion Gap 12 7 - 15 mmol/L    Urea Nitrogen 12.9 6.0 - 20.0 mg/dL    Creatinine 0.77 0.51 - 0.95 mg/dL    GFR Estimate >90 >60 mL/min/1.73m2    Calcium 9.0 8.8 - 10.4 mg/dL    Chloride 107 98 - 107 mmol/L    Glucose 74 70 - 99 mg/dL    Alkaline Phosphatase 102 40 - 150 U/L    AST 20 0 - 45 U/L    ALT 9 0 - 50 U/L    Protein Total 7.0 6.4 - 8.3 g/dL    Albumin 4.3 3.5 - 5.2 g/dL    Bilirubin Total 0.2 <=1.2  mg/dL   Troponin T, High Sensitivity     Status: Normal   Result Value Ref Range    Troponin T, High Sensitivity <6 <=14 ng/L   Magnesium     Status: Normal   Result Value Ref Range    Magnesium 2.2 1.7 - 2.3 mg/dL   TSH with free T4 reflex     Status: Normal   Result Value Ref Range    TSH 1.97 0.30 - 4.20 uIU/mL   Lipase     Status: Normal   Result Value Ref Range    Lipase 28 13 - 60 U/L   D dimer quantitative     Status: Normal   Result Value Ref Range    D-Dimer Quantitative 0.27 0.00 - 0.50 ug/mL FEU    Narrative    This D-dimer assay is intended for use in conjunction with a clinical pretest probability assessment model to exclude pulmonary embolism (PE) and deep venous thrombosis (DVT) in outpatients suspected of PE or DVT. The cut-off value is 0.50 ug/mL FEU.   HCG qualitative pregnancy (blood)     Status: Normal   Result Value Ref Range    hCG Serum Qualitative Negative Negative   CBC with platelets and differential     Status: Abnormal   Result Value Ref Range    WBC Count 8.7 4.0 - 11.0 10e3/uL    RBC Count 4.72 3.80 - 5.20 10e6/uL    Hemoglobin 11.2 (L) 11.7 - 15.7 g/dL    Hematocrit 35.6 35.0 - 47.0 %    MCV 75 (L) 78 - 100 fL    MCH 23.7 (L) 26.5 - 33.0 pg    MCHC 31.5 31.5 - 36.5 g/dL    RDW 15.7 (H) 10.0 - 15.0 %    Platelet Count 409 150 - 450 10e3/uL    % Neutrophils 62 %    % Lymphocytes 22 %    % Monocytes 13 %    % Eosinophils 1 %    % Basophils 1 %    % Immature Granulocytes 1 %    NRBCs per 100 WBC 0 <1 /100    Absolute Neutrophils 5.4 1.6 - 8.3 10e3/uL    Absolute Lymphocytes 2.0 0.8 - 5.3 10e3/uL    Absolute Monocytes 1.1 0.0 - 1.3 10e3/uL    Absolute Eosinophils 0.1 0.0 - 0.7 10e3/uL    Absolute Basophils 0.1 0.0 - 0.2 10e3/uL    Absolute Immature Granulocytes 0.0 <=0.4 10e3/uL    Absolute NRBCs 0.0 10e3/uL   EKG 12 lead     Status: None (Preliminary result)   Result Value Ref Range    Systolic Blood Pressure  mmHg    Diastolic Blood Pressure  mmHg    Ventricular Rate 101 BPM    Atrial  Rate 101 BPM    TX Interval 160 ms    QRS Duration 88 ms     ms    QTc 461 ms    P Axis 54 degrees    R AXIS 34 degrees    T Axis 35 degrees    Interpretation ECG       Sinus tachycardia  Possible Left atrial enlargement  Borderline ECG     CBC with Platelets & Differential     Status: Abnormal    Narrative    The following orders were created for panel order CBC with Platelets & Differential.  Procedure                               Abnormality         Status                     ---------                               -----------         ------                     CBC with platelets and d...[[id number]]  Abnormal            Final result                 Please view results for these tests on the individual orders.     Medications   alum & mag hydroxide-simethicone (MAALOX) suspension 15 mL (15 mLs Oral $Given 7/16/24 0829)   lidocaine (viscous) (XYLOCAINE) 2 % solution 10 mL (10 mLs Mouth/Throat $Given 7/16/24 1519)   sodium chloride 0.9% BOLUS 500 mL (0 mLs Intravenous Stopped 7/16/24 1651)     Labs Ordered and Resulted from Time of ED Arrival to Time of ED Departure   COMPREHENSIVE METABOLIC PANEL - Abnormal       Result Value    Sodium 138      Potassium 3.6      Carbon Dioxide (CO2) 19 (*)     Anion Gap 12      Urea Nitrogen 12.9      Creatinine 0.77      GFR Estimate >90      Calcium 9.0      Chloride 107      Glucose 74      Alkaline Phosphatase 102      AST 20      ALT 9      Protein Total 7.0      Albumin 4.3      Bilirubin Total 0.2     CBC WITH PLATELETS AND DIFFERENTIAL - Abnormal    WBC Count 8.7      RBC Count 4.72      Hemoglobin 11.2 (*)     Hematocrit 35.6      MCV 75 (*)     MCH 23.7 (*)     MCHC 31.5      RDW 15.7 (*)     Platelet Count 409      % Neutrophils 62      % Lymphocytes 22      % Monocytes 13      % Eosinophils 1      % Basophils 1      % Immature Granulocytes 1      NRBCs per 100 WBC 0      Absolute Neutrophils 5.4      Absolute Lymphocytes 2.0      Absolute Monocytes 1.1       Absolute Eosinophils 0.1      Absolute Basophils 0.1      Absolute Immature Granulocytes 0.0      Absolute NRBCs 0.0     TROPONIN T, HIGH SENSITIVITY - Normal    Troponin T, High Sensitivity <6     MAGNESIUM - Normal    Magnesium 2.2     TSH WITH FREE T4 REFLEX - Normal    TSH 1.97     LIPASE - Normal    Lipase 28     D DIMER QUANTITATIVE - Normal    D-Dimer Quantitative 0.27     HCG QUALITATIVE PREGNANCY - Normal    hCG Serum Qualitative Negative       Chest XR,  PA & LAT   Final Result   IMPRESSION: No acute cardiopulmonary findings.      I have personally reviewed the examination and initial interpretation   and I agree with the findings.      ELAINA GARCÍA MD            SYSTEM ID:  [id number]             Critical care was not performed.     Medical Decision Making  The patient's presentation was of high complexity (an acute health issue posing potential threat to life or bodily function).    The patient's evaluation involved:  review of 1 test result(s) ordered prior to this encounter (see separate area of note for details)  strong consideration of a test (see separate area of note for details) that was ultimately deferred  ordering and/or review of 3+ test(s) in this encounter (see separate area of note for details)    The patient's management necessitated moderate risk (prescription drug management including medications given in the ED).    Assessment & Plan    Patient presents today for the above complaints.  On my evaluation she is alert, cooperative, tearful, understandably upset.  She is tachycardic with a heart rate of 119.  Blood pressure is normal.    Differential diagnosis certainly is extensive, need to consider primary cardiac etiology, ACS, pericarditis, particularly given the fact that she recently recovered from a COVID infection, also need to consider respiratory etiology, pleurisy, PE, pneumonia, pneumothorax. Anxiety could cause the symptoms as could GERD, zoster would be a consideration  but she does not have a rash. EKG was done upon arrival and this demonstrates sinus tachycardia with a rate of 101, I do not see any sign of ectopy or ischemia. We did establish IV access and we did draw blood for laboratory analysis. CBC shows no evidence of leukocytosis, CMP is normal with the exception of 5, bicarb of 19. Troponin is negative. Magnesium is normal at 2.2, lipase is normal, D-dimer is within normal limits at 0.27, hCG is negative. TSH is normal. Chest x-ray was read by myself and I do not see any acute infiltrates, radiology overread agrees. Patient was given a GI cocktail and IV fluids here in the emergency department.     Upon reassessment patient is feeling improved after her GI cocktail. I have reassured her that we do not see any serious signs of pulmonary or cardiac etiology. She could have reflux, alternatively she may have a component of inflammation or pleurisy related to her recent COVID infection. I have asked her to try antacids to see if this is helpful for her. If she continues to notice palpitations, I would advise that she follow-up with her primary clinic and possibly consider outpatient Zio patch monitoring. If she is noticing new or worsening symptoms she should return to the emergency department.    I have reviewed the nursing notes. I have reviewed the findings, diagnosis, plan and need for follow up with the patient.    This part of the document was transcribed by Nanette Contreras, Medical Scribe.     Discharge Medication List as of 7/16/2024  6:25 PM          Final diagnoses:   Chest pain, unspecified type   Palpitations       Camryn Edmonds MD  [facility] EMERGENCY DEPARTMENT  7/16/2024        Camryn Edmonds MD  07/16/24 2140

## 2024-07-16 NOTE — ED TRIAGE NOTES
Pt wok up this AM and had severe R neck, shoulder, arm pain. Dizziness and nausea. 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 At 2pm pt had to leave work due to feeling so terrible.    Triage Assessment (Adult)       Row Name 07/16/24 [id number]          Triage Assessment    Airway WDL WDL        Respiratory WDL    Respiratory WDL X;rhythm/pattern     Rhythm/Pattern, Respiratory shortness of breath        Skin Circulation/Temperature WDL    Skin Circulation/Temperature WDL WDL        Cardiac WDL    Cardiac WDL X;chest pain        Peripheral/Neurovascular WDL    Peripheral Neurovascular WDL WDL        Cognitive/Neuro/Behavioral WDL    Cognitive/Neuro/Behavioral WDL WDL

## 2024-07-17 ENCOUNTER — TELEPHONE (OUTPATIENT)
Dept: ENDOCRINOLOGY | Facility: CLINIC | Age: 49
End: 2024-07-17

## 2024-07-17 ENCOUNTER — VIRTUAL VISIT (OUTPATIENT)
Dept: ENDOCRINOLOGY | Facility: CLINIC | Age: 49
End: 2024-07-17
Payer: COMMERCIAL

## 2024-07-17 VITALS — HEIGHT: 68 IN | WEIGHT: 215 LBS | BODY MASS INDEX: 32.58 KG/M2

## 2024-07-17 DIAGNOSIS — E66.01 CLASS 2 SEVERE OBESITY WITH SERIOUS COMORBIDITY AND BODY MASS INDEX (BMI) OF 37.0 TO 37.9 IN ADULT, UNSPECIFIED OBESITY TYPE (H): Primary | ICD-10-CM

## 2024-07-17 DIAGNOSIS — E66.812 CLASS 2 SEVERE OBESITY WITH SERIOUS COMORBIDITY AND BODY MASS INDEX (BMI) OF 37.0 TO 37.9 IN ADULT, UNSPECIFIED OBESITY TYPE (H): Primary | ICD-10-CM

## 2024-07-17 DIAGNOSIS — E61.1 IRON DEFICIENCY: ICD-10-CM

## 2024-07-17 PROCEDURE — G2211 COMPLEX E/M VISIT ADD ON: HCPCS | Mod: 95

## 2024-07-17 PROCEDURE — 99215 OFFICE O/P EST HI 40 MIN: CPT | Mod: 95

## 2024-07-17 RX ORDER — SEMAGLUTIDE 0.5 MG/.5ML
0.5 INJECTION, SOLUTION SUBCUTANEOUS WEEKLY
Qty: 2 ML | Refills: 1 | Status: SHIPPED | OUTPATIENT
Start: 2024-07-17

## 2024-07-17 RX ORDER — SEMAGLUTIDE 0.25 MG/.5ML
0.25 INJECTION, SOLUTION SUBCUTANEOUS WEEKLY
Qty: 2 ML | Refills: 0 | Status: SHIPPED | OUTPATIENT
Start: 2024-07-17

## 2024-07-17 RX ORDER — FERROUS SULFATE 325(65) MG
325 TABLET, DELAYED RELEASE (ENTERIC COATED) ORAL DAILY
Qty: 90 TABLET | Refills: 3 | Status: SHIPPED | OUTPATIENT
Start: 2024-07-17 | End: 2025-07-17

## 2024-07-17 ASSESSMENT — PAIN SCALES - GENERAL: PAINLEVEL: NO PAIN (0)

## 2024-07-17 NOTE — LETTER
2024       RE: Chayo Nathan  [id number] [facility] [address]     Dear Colleague,    Thank you for referring your patient, Chayo Nathan, to the [facility] [place] at [facility]. Please see a copy of my visit note below.    Virtual Visit Details    Type of service:  Video Visit     Originating Location (pt. Location): Home    Distant Location (provider location):  Off-site  Platform used for Video Visit: [facility] Medical Weight Management Note     Chayo Nathan  MRN:  [id number]  :  1975  VIOLET:  2024    Dear Shawna Garcia PA-C,    I had the pleasure of seeing your patient Chayo Nathan. She is a 49 year old female who I am continuing to see for treatment of obesity related to:        2024     2:39 PM   --   I have the following health issues associated with obesity Stress Incontinence   I have the following symptoms associated with obesity Knee Pain    Lower Extremity Swelling    Fatigue    Groin Rash       Assessment & Plan  Problem List Items Addressed This Visit       Class 2 severe obesity with serious comorbidity and body mass index (BMI) of 37.0 to 37.9 in adult, unspecified obesity type (H) - Primary    Relevant Medications    Semaglutide-Weight Management (WEGOVY) 0.25 MG/0.5ML pen    Semaglutide-Weight Management (WEGOVY) 0.5 MG/0.5ML pen    ferrous sulfate (FE TABS) 325 (65 Fe) MG EC tablet     Other Visit Diagnoses       Iron deficiency        Relevant Medications    ferrous sulfate (FE TABS) 325 (65 Fe) MG EC tablet           Plan  Discontinue phentermine due to concern with palpitations, chest pain (as she also had COVID 3 weeks ago and was previously doing well on the phentermine, it is unclear phentermine was at the root cause of these palpitation/chest pain symptoms, will still discontinue out of an abundance of caution.)  Decrease topiramate to 75mg, can  consider tapering down further in the future if tolerating wegovy   Start Wegovy 0.25mg once weekly for 4 weeks, then increase to 0.5mg once weekly.   Start oral iron, prescription sent. Contact clinic if getting worsened nausea (has gotten nausea with oral iron in distant past).   Goals we discussed today:   Sleep at least 6.5 hours of sleep at least 5 nights a week- get pajamas on every night by 10pm and put phone on  away from bed by 10:30pm   Follow up with Vanessa in 3 months   Dietician appointment as needed   Keep up the excellent work!     Anti-obesity medication started today for this patient   WEGOVY  No history of pancreatitis   No personal or family history of medullary thyroid carcinoma  No personal or family history of Multiple Endocrine Neoplasia Type 2   Side effects and risks associated with this medication, as well as medication administration instructions, were discussed. Patient expressed understanding and a willingness to proceed with starting this medication.         INTERVAL HISTORY:  RYGB 2007   Patient with joint pain, fatigue. Additionally diagnosed with MS in 2018, managed by neurologist Dr. Love, is currently getting an infusion for this once every 6 months. Only current MS symptom she experiences is an occasional left foot drop. Additionally has dealt with iron deficiency anemia over the last several years, this has been determined to be in part due to history of rygb and also heavy menstrual cycles. Had a hysterectomy last year and hopes this has resolved these issues, also takes multivitamin with iron.      New MWM with me 2/2/24- had started phentermine 37.5mg 1 month prior through pcp. We started topiramate in addition.      Last seen by me 4/16/24:   Has seen 18lbs weight loss over the last 2 months.  Reports things are going well, has seen some plateauing over the last few weeks.   Feels clothes are fitting better.     Discussed working in weight training to her week  "to help break through plateau.      Could consider adding contrave in the future.      Was unable to see RD, will schedule as needed moving forward.     Could consider wegovy in the future, Chayo reports her insurance requires her to first try qsymia and contrave.     Since last visit:   -had covid 3 weeks ago, feeling better from this   -in ED yesterday (7/16/24) due to palpitations, shortness of breath, right sided chest pain going up to right shoulder and right neck, dizziness, nausea. D-dimer negative, chest x ray negative. EKG positive for sinus tach (101 bpm)    -feeling better today, describes yesterday her heart was doing \"acrobatics,\" wonders if palpitations were triggered from covid.  Discussed a plan to discontinue phentermine out of an abundance of caution, however it is not clear if phentermine was at the root of the symptoms.    -plateau lately for the last few weeks     -interested in changing medications.   Wt Readings from Last 5 Encounters:   07/17/24 97.5 kg (215 lb)   06/28/24 99.2 kg (218 lb 9.6 oz)   05/24/24 105.7 kg (233 lb)   04/25/24 104.8 kg (231 lb)   04/16/24 104.8 kg (231 lb)       Anti-obesity medication history    Current:   Phentermine 37.5mg- doing well, no side effects. Palpitations yesterday, none prior. Palpitations are possibly related to covid  Topiramate- 50mg BID- no side effects, weight plateau lately     Past/Failed/contraindicated:         Recent diet changes: smaller portions, prioritizing protein    Recent exercise/activity changes: doing some body weight training lately (planks) in living room       Vitamins/Labs: post rygb labs completed 2/5/24, low hemoglobin. Discussed starting an iron supplement, will send that in today.       CURRENT WEIGHT:   215 lbs 0 oz    Initial Weight (lbs): 254 lbs  Last Visits Weight: 99.2 kg (218 lb 9.6 oz)  Cumulative weight loss (lbs): 39  Weight Loss Percentage: 15.35%        7/16/2024     4:32 PM   Changes and Difficulties   I have " made the following changes to my diet since my last visit: Focused on protein   With regards to my diet, I am still struggling with: Increase in food noise lately   I have made the following changes to my activity/exercise since my last visit: Added some weights             MEDICATIONS:   Current Outpatient Medications   Medication Sig Dispense Refill    ferrous sulfate (FE TABS) 325 (65 Fe) MG EC tablet Take 1 tablet (325 mg) by mouth daily 90 tablet 3    Semaglutide-Weight Management (WEGOVY) 0.25 MG/0.5ML pen Inject 0.25 mg subcutaneously once a week For 4 weeks 2 mL 0    Semaglutide-Weight Management (WEGOVY) 0.5 MG/0.5ML pen Inject 0.5 mg subcutaneously once a week After completing 4 weeks of 0.25mg dose 2 mL 1    ISOtretinoin (ABSORICA) 30 MG capsule TAKE 2 CAPSULES BY MOUTH DAILY WITH FATTY MEAL      multivitamin w/minerals (THERA-VIT-M) tablet Take 1 tablet by mouth daily      ocrelizumab (OCREVUS) 300 MG/10ML SOLN injection Every six months      ondansetron (ZOFRAN) 4 MG tablet Take one tablet every six hours for nausea during colonoscopy bowel prepping 3 tablet 0    phentermine (ADIPEX-P) 37.5 MG tablet Take 1 tablet (37.5 mg) by mouth every morning (before breakfast) 30 tablet 2    topiramate (TOPAMAX) 25 MG tablet 50mg (2 tablets) in the morning and 50mg (2 tablets) at night for a total of 100mg each day. 120 tablet 3    vitamin B-12 (CYANOCOBALAMIN) 2500 MCG sublingual tablet Take 2,500 mcg by mouth daily      vitamin C (ASCORBIC ACID) 1000 MG TABS Take 1,000 mg by mouth daily      vitamin D3 (CHOLECALCIFEROL) 21198 units (250 mcg) capsule Take one tablet every other day 15 capsule 11           7/16/2024     4:32 PM   Weight Loss Medication History Reviewed With Patient   Which weight loss medications are you currently taking on a regular basis? Qysmia (phentermine/topiramate)   Are you having any side effects from the weight loss medication that we have prescribed you? No                No data to  "display                  PHYSICAL EXAM:  Objective   Ht 1.727 m (5' 8\")   Wt 97.5 kg (215 lb)   LMP 08/28/2023 (Approximate)   BMI 32.69 kg/m             Vitals:  No vitals were obtained today due to virtual visit.    GENERAL: alert and no distress  EYES: Eyes grossly normal to inspection.  No discharge or erythema, or obvious scleral/conjunctival abnormalities.  RESP: No audible wheeze, cough, or visible cyanosis.    SKIN: Visible skin clear. No significant rash, abnormal pigmentation or lesions.  NEURO: Cranial nerves grossly intact.  Mentation and speech appropriate for age.  PSYCH: Appropriate affect, tone, and pace of words        Sincerely,    Vanessa Mcclendon PA-C      45 minutes spent by me on the date of the encounter doing chart review, history and exam, documentation and further activities per the note    The longitudinal plan of care for the diagnosis(es)/condition(s) as documented were addressed during this visit. Due to the added complexity in care, I will continue to support Chayo in the subsequent management and with ongoing continuity of care.     "

## 2024-07-17 NOTE — PROGRESS NOTES
Virtual Visit Details    Type of service:  Video Visit     Originating Location (pt. Location): Home    Distant Location (provider location):  Off-site  Platform used for Video Visit: [facility] Medical Weight Management Note     Chayo Nathan  MRN:  [id number]  :  1975  VIOLET:  2024    Dear Shawna Garcia PA-C,    I had the pleasure of seeing your patient Chayo Nathan. She is a 49 year old female who I am continuing to see for treatment of obesity related to:        2024     2:39 PM   --   I have the following health issues associated with obesity Stress Incontinence   I have the following symptoms associated with obesity Knee Pain    Lower Extremity Swelling    Fatigue    Groin Rash       Assessment & Plan   Problem List Items Addressed This Visit       Class 2 severe obesity with serious comorbidity and body mass index (BMI) of 37.0 to 37.9 in adult, unspecified obesity type (H) - Primary    Relevant Medications    Semaglutide-Weight Management (WEGOVY) 0.25 MG/0.5ML pen    Semaglutide-Weight Management (WEGOVY) 0.5 MG/0.5ML pen    ferrous sulfate (FE TABS) 325 (65 Fe) MG EC tablet     Other Visit Diagnoses       Iron deficiency        Relevant Medications    ferrous sulfate (FE TABS) 325 (65 Fe) MG EC tablet           Plan  Discontinue phentermine due to concern with palpitations, chest pain (as she also had COVID 3 weeks ago and was previously doing well on the phentermine, it is unclear phentermine was at the root cause of these palpitation/chest pain symptoms, will still discontinue out of an abundance of caution.)  Decrease topiramate to 75mg, can consider tapering down further in the future if tolerating wegovy   Start Wegovy 0.25mg once weekly for 4 weeks, then increase to 0.5mg once weekly.   Start oral iron, prescription sent. Contact clinic if getting worsened nausea (has gotten nausea with oral iron in distant past).   Goals we discussed today:   Sleep at least 6.5  hours of sleep at least 5 nights a week- get pajamas on every night by 10pm and put phone on  away from bed by 10:30pm   Follow up with Vanessa in 3 months   Dietician appointment as needed   Keep up the excellent work!     Anti-obesity medication started today for this patient   WEGOVY  No history of pancreatitis   No personal or family history of medullary thyroid carcinoma  No personal or family history of Multiple Endocrine Neoplasia Type 2   Side effects and risks associated with this medication, as well as medication administration instructions, were discussed. Patient expressed understanding and a willingness to proceed with starting this medication.         INTERVAL HISTORY:  RYGB 2007   Patient with joint pain, fatigue. Additionally diagnosed with MS in 2018, managed by neurologist Dr. Love, is currently getting an infusion for this once every 6 months. Only current MS symptom she experiences is an occasional left foot drop. Additionally has dealt with iron deficiency anemia over the last several years, this has been determined to be in part due to history of rygb and also heavy menstrual cycles. Had a hysterectomy last year and hopes this has resolved these issues, also takes multivitamin with iron.      New MWM with me 2/2/24- had started phentermine 37.5mg 1 month prior through pcp. We started topiramate in addition.      Last seen by me 4/16/24:   Has seen 18lbs weight loss over the last 2 months.  Reports things are going well, has seen some plateauing over the last few weeks.   Feels clothes are fitting better.     Discussed working in weight training to her week to help break through plateau.      Could consider adding contrave in the future.      Was unable to see RD, will schedule as needed moving forward.     Could consider wegovy in the future, Chayo reports her insurance requires her to first try qsymia and contrave.     Since last visit:   -had covid 3 weeks ago, feeling better  "from this   -in ED yesterday (7/16/24) due to palpitations, shortness of breath, right sided chest pain going up to right shoulder and right neck, dizziness, nausea. D-dimer negative, chest x ray negative. EKG positive for sinus tach (101 bpm)    -feeling better today, describes yesterday her heart was doing \"acrobatics,\" wonders if palpitations were triggered from covid.  Discussed a plan to discontinue phentermine out of an abundance of caution, however it is not clear if phentermine was at the root of the symptoms.    -plateau lately for the last few weeks     -interested in changing medications.   Wt Readings from Last 5 Encounters:   07/17/24 97.5 kg (215 lb)   06/28/24 99.2 kg (218 lb 9.6 oz)   05/24/24 105.7 kg (233 lb)   04/25/24 104.8 kg (231 lb)   04/16/24 104.8 kg (231 lb)       Anti-obesity medication history    Current:   Phentermine 37.5mg- doing well, no side effects. Palpitations yesterday, none prior. Palpitations are possibly related to covid  Topiramate- 50mg BID- no side effects, weight plateau lately     Past/Failed/contraindicated:         Recent diet changes: smaller portions, prioritizing protein    Recent exercise/activity changes: doing some body weight training lately (planks) in living room       Vitamins/Labs: post rygb labs completed 2/5/24, low hemoglobin. Discussed starting an iron supplement, will send that in today.       CURRENT WEIGHT:   215 lbs 0 oz    Initial Weight (lbs): 254 lbs  Last Visits Weight: 99.2 kg (218 lb 9.6 oz)  Cumulative weight loss (lbs): 39  Weight Loss Percentage: 15.35%        7/16/2024     4:32 PM   Changes and Difficulties   I have made the following changes to my diet since my last visit: Focused on protein   With regards to my diet, I am still struggling with: Increase in food noise lately   I have made the following changes to my activity/exercise since my last visit: Added some weights             MEDICATIONS:   Current Outpatient Medications " "  Medication Sig Dispense Refill    ferrous sulfate (FE TABS) 325 (65 Fe) MG EC tablet Take 1 tablet (325 mg) by mouth daily 90 tablet 3    Semaglutide-Weight Management (WEGOVY) 0.25 MG/0.5ML pen Inject 0.25 mg subcutaneously once a week For 4 weeks 2 mL 0    Semaglutide-Weight Management (WEGOVY) 0.5 MG/0.5ML pen Inject 0.5 mg subcutaneously once a week After completing 4 weeks of 0.25mg dose 2 mL 1    ISOtretinoin (ABSORICA) 30 MG capsule TAKE 2 CAPSULES BY MOUTH DAILY WITH FATTY MEAL      multivitamin w/minerals (THERA-VIT-M) tablet Take 1 tablet by mouth daily      ocrelizumab (OCREVUS) 300 MG/10ML SOLN injection Every six months      ondansetron (ZOFRAN) 4 MG tablet Take one tablet every six hours for nausea during colonoscopy bowel prepping 3 tablet 0    phentermine (ADIPEX-P) 37.5 MG tablet Take 1 tablet (37.5 mg) by mouth every morning (before breakfast) 30 tablet 2    topiramate (TOPAMAX) 25 MG tablet 50mg (2 tablets) in the morning and 50mg (2 tablets) at night for a total of 100mg each day. 120 tablet 3    vitamin B-12 (CYANOCOBALAMIN) 2500 MCG sublingual tablet Take 2,500 mcg by mouth daily      vitamin C (ASCORBIC ACID) 1000 MG TABS Take 1,000 mg by mouth daily      vitamin D3 (CHOLECALCIFEROL) 18146 units (250 mcg) capsule Take one tablet every other day 15 capsule 11           7/16/2024     4:32 PM   Weight Loss Medication History Reviewed With Patient   Which weight loss medications are you currently taking on a regular basis? Qysmia (phentermine/topiramate)   Are you having any side effects from the weight loss medication that we have prescribed you? No                No data to display                  PHYSICAL EXAM:  Objective    Ht 1.727 m (5' 8\")   Wt 97.5 kg (215 lb)   LMP 08/28/2023 (Approximate)   BMI 32.69 kg/m             Vitals:  No vitals were obtained today due to virtual visit.    GENERAL: alert and no distress  EYES: Eyes grossly normal to inspection.  No discharge or erythema, or " obvious scleral/conjunctival abnormalities.  RESP: No audible wheeze, cough, or visible cyanosis.    SKIN: Visible skin clear. No significant rash, abnormal pigmentation or lesions.  NEURO: Cranial nerves grossly intact.  Mentation and speech appropriate for age.  PSYCH: Appropriate affect, tone, and pace of words        Sincerely,    Vanessa Mcclendon PA-C      45 minutes spent by me on the date of the encounter doing chart review, history and exam, documentation and further activities per the note    The longitudinal plan of care for the diagnosis(es)/condition(s) as documented were addressed during this visit. Due to the added complexity in care, I will continue to support Chayo in the subsequent management and with ongoing continuity of care.

## 2024-07-17 NOTE — PATIENT INSTRUCTIONS
"Thank you for allowing us the privilege of caring for you. We hope we provided you with the excellent service you deserve.   Please let us know if there is anything else we can do for you so that we can be sure you are completely satisfied with your care experience.    To ensure the quality of our services you may be receiving a patient satisfaction survey from an independent patient satisfaction monitoring company.    The greatest compliment you can give is a \"Likely to Recommend\"    Your visit was with Vanessa Mcclendon PA-C today.    Instructions per today's visit:     Dionisio Nathan, it was great to visit with you today.  Here is a review of our visit.  If our clinic scheduler is not able to reach you please call [phone] to schedule your next appointments.    Plan  Discontinue phentermine due to concern with palpitations, chest pain yesterday.  As we discussed, it is not clear if phentermine was the cause of the symptoms, however, it would be best to discontinue this out of an abundance of caution for the time being.  Decrease topiramate to 75mg, can consider tapering further in the future if tolerating wegovy   Start Wegovy 0.25mg once weekly for 4 weeks, then increase to 0.5mg once weekly.   Goals we discussed today:   Sleep at least 6.5 hours of sleep at least 5 nights a week- get pajamas on every night by 10pm and put phone on  away from bed by 10:30pm   Follow up with Vanessa in 3 months   Dietician appointment as needed   Keep up the excellent work!       Information about Video Visits with [facility]: video visit information  _________________________________________________________________________________________________________________________________________________________  If you are asked by your clinic team to have your blood pressure checked:  [facility] do offer several locations for blood pressure checks. Please follow the below link to schedule an appointment. " Scheduling an appointment at the pharmacy for a blood pressure check is now preferred.    Appointment Plus ([email or web address])  _________________________________________________________________________________________________________________________________________________________  Important contact and scheduling information:  Please call our contact center at [phone] to schedule your next appointments.  To find a lab location near you, please call [phone].  For any nursing questions or concerns call Hayley Zamora LPN at [phone] or Xiomy Neil RN at [phone]  Please call during clinic hours Monday through Friday 8:00a - 4:00p if you have questions or you can contact us via [employer] at anytime and we will reply during clinic hours.    Lab results will be communicated through My Chart or letter (if My Chart not used). Please call the clinic if you have not received communication after 1 week or if you have any questions.?  Clinic Fax: [phone]    _________________________________________________________________________________________________________________________________________________________  Meal Replacement Products:    Here is the link to our new e-store where you can purchase our meal replacement products    [facility] E-Store  [email or web address]    The one week starter kit is a great way to sample a variety of products and see what works for you.    If you want more information about the product go to: [employer]    If you are an employee or [facility] or [facility] please contact your care team for a 10% estore discount    Free Shipping for orders over $75     Benefits of meal replacements products:    Portion and calorie control  Improved nutrition  Structured eating  Simplified food choices  Avoid contact with trigger  foods  _________________________________________________________________________________________________________________________________________________________  Interested in working with a health ?  Health coaches work with you to improve your overall health and wellbeing.  They look at the whole person, and may involve discussion of different areas of life, including, but not limited to the four pillars of health (sleep, exercise, nutrition, and stress management). Discuss with your care team if you would like to start working a health .  Health Coaching-3 Pack: Schedule by calling [phone]    $99 for three health coaching visits    Visits may be done in person or via phone    Coaching is a partnership between the  and the client; Coaches do not prescribe or diagnose    Coaching helps inspire the client to reach his/her personal goals   _________________________________________________________________________________________________________________________________________________________  24 Week Healthy Lifestyle Plan:    Our mission in the 24-week Healthy Lifestyle Plan is to provide you with individualized care by giving you the tools, education and support you need to lose weight and maintain a healthy lifestyle. In your 24-week journey, you ll be supported by a dedicated weight loss team that includes registered dietitians, medical weight management providers, health coaches, and nurses -- all with special expertise in weight loss -- to help you every step of the way.     Monthly meetings with your registered dietician or medical weight management provider help to review your progress, update your care plan, and make any adjustments needed to ensure success. Between these visits, weekly and bi-weekly health  visits will help you focus on the four pillars of weight loss -- stress, sleep, nutrition, and exercise -- and how you can best adapt each to achieve sustainable weight loss  results.    In addition, you will be given exclusive access to online wellbeing classes through [employer].  Your initial visit will be with a medical weight management provider who will help to understand your weight loss goals and ensure this program is the right fit for you. Please let our team know if you are interested in the 24 week plan by sending a message to your care team or calling [phone] to schedule.  _________________________________________________________________________________________________________________________________________________________  __________  [facility] Get Moving Program  Our team of physical therapists is trained to help you understand and take control of your condition. They will perform a thorough evaluation to determine your ability for activity and develop a customized plan to fit your goals and physical ability.  Scheduling: Unsure if the Get Moving program is right for you? Discuss the program with your medical provider or diabetes educator. You can also call us at [phone] to ask questions or schedule an appointment.   [facility] Get Moving Program  ____________________________________________________________________________________________________________________________________________________________________________   [facility] Diabetes Prevention Program (DPP)  If you have prediabetes and Medicare please contact us via [employer] to learn more about the Diabetes Prevention Program (DPP)  Program Details:   [facility] offers the year-long Diabetes Prevention Program (DPP). The program helps you to make lifestyle changes that prevent or delay type 2 diabetes by supporting healthy eating, increased physical activity, stress reduction and use of coping skills.   On average, previous [facility] DPP cohorts have lost and maintained at least 5% of their starting weight throughout the program and averaged more than 150 minutes of physical  activity per week.  Participants meet weekly for one-hour group sessions over sixteen weeks, every other week for the next 8 weeks, and monthly for the last six months.   A year-long maintenance program is also available for participants who complete the first year.   Location & Cost:   During the COVID-19 Public Health Emergency, the program is offered virtually. When in-person classes can resume, they will be held at [facility].  For people with Medicare, the program is covered in full. A self-pay option will also be available for those with non-Medicare insurance plans.   ______________________________________________________________________________________________________________________________________________________________________________________________________________________________    To work with a Behavioral Health Psychologist:    Call to schedule:    Cain Arndt - [phone]  Mirta Bahena - [phone]  Siria Valdez - [phone]  Eduarda Hairston - [phone]   Clarisse Jackson PhD (cannot accept Medicare) [phone]        Thank you,   [facility] Comprehensive Weight Management Team

## 2024-07-17 NOTE — TELEPHONE ENCOUNTER
PA Initiation (Key: [id number]    Medication: WEGOVY 0.25 MG/0.5ML SC SOAJ  Insurance Company: Cash Check Card - Phone [phone] Fax [phone]  Pharmacy Filling the Rx: [id number] [place] [address]  Filling Pharmacy Phone: [phone]  Filling Pharmacy Fax: [phone]  Start Date: 7/17/2024

## 2024-07-17 NOTE — NURSING NOTE
Current patient location: [address] [facility] [place] [place] [address]    Is the patient currently in the state of [place]? YES    Visit mode:VIDEO    If the visit is dropped, the patient can be reconnected by: VIDEO VISIT: Text to cell phone:   Telephone Information:   Mobile [phone]       Will anyone else be joining the visit? NO  (If patient encounters technical issues they should call [phone]    How would you like to obtain your AVS? MyChart    Are changes needed to the allergy or medication list? No    Are refills needed on medications prescribed by this physician? NO    Reason for visit: RECHECK    Stephanie PEÑA

## 2024-07-18 ENCOUNTER — MYC MEDICAL ADVICE (OUTPATIENT)
Dept: ENDOCRINOLOGY | Facility: CLINIC | Age: 49
End: 2024-07-18
Payer: COMMERCIAL

## 2024-07-18 NOTE — TELEPHONE ENCOUNTER
Prior Authorization Approval    Medication: WEGOVY 0.25 MG/0.5ML SC SOAJ  Authorization Effective Date: 7/17/2024  Authorization Expiration Date: 2/5/2025  Approved Dose/Quantity:    Reference #: Key: [id number]   Insurance Company: [employer] - Phone [phone] Fax [phone]  Expected CoPay: $    CoPay Card Available: No    Financial Assistance Needed:    [id number] [place] [address]  Pharmacy Notified: Y  Patient Notified: Y

## 2024-07-19 NOTE — TELEPHONE ENCOUNTER
Prior Authorization Approval    Medication: WEGOVY 0.25 MG/0.5ML SC SOAJ  Authorization Effective Date: 7/17/2024  Authorization Expiration Date: 2/5/2025  Approved Dose/Quantity:    Reference #: Key: [id number]   Insurance Company: [employer] - Phone [phone] Fax [phone]  Expected CoPay: $    CoPay Card Available: No    Financial Assistance Needed:    Which Pharm[facility] is filling the prescription[id number] [place] [address]  Pharmacy Notified: Y  Patient Notified: Y

## 2024-07-22 ENCOUNTER — TELEPHONE (OUTPATIENT)
Dept: ENDOCRINOLOGY | Facility: CLINIC | Age: 49
End: 2024-07-22
Payer: COMMERCIAL

## 2024-08-13 ENCOUNTER — MYC MEDICAL ADVICE (OUTPATIENT)
Dept: FAMILY MEDICINE | Facility: CLINIC | Age: 49
End: 2024-08-13
Payer: COMMERCIAL

## 2024-08-13 ENCOUNTER — NURSE TRIAGE (OUTPATIENT)
Dept: FAMILY MEDICINE | Facility: CLINIC | Age: 49
End: 2024-08-13
Payer: COMMERCIAL

## 2024-08-13 NOTE — TELEPHONE ENCOUNTER
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 YES, LICENSED PRACTITIONER REVIEW IS REQUIRED    Situation: patient sent in Broadcast Pixt message c/o heart palpitations, had been seen in ED on 7/16/24 and was told to follow up with PCP for a Zio hear monitor.     Background: patient had gone to ED on 7/16/24 for severe right sided chest pain that went into neck and shoulder, EKG completed by them showed sinus tachycardia, rate 100-110 BPM, was told palpitations could be r/t COVID infection in June. Patient was advised to contact PCP if palpitations continued for a Zio heart monitor.     Assessment: patient needs to be seen for further assessment and work up. Reviewed disposition, due to time of day, advised to be seen in urgent care or ED, patient declined.     Protocol Recommended Disposition:   See in Office Today    Recommendation: is it ok for patient to be seen in clinic for video visit 8/14/24? Appointment scheduled for 3 pm virtual visit     Routed to provider    Does the patient meet one of the following criteria for ADS visit consideration? No    Reason for Disposition   Skipped or extra beat(s) and increases with exercise or exertion    Additional Information   Negative: Passed out (i.e., lost consciousness, collapsed and was not responding)   Negative: Shock suspected (e.g., cold/pale/clammy skin, too weak to stand, low BP, rapid pulse)   Negative: Difficult to awaken or acting confused (e.g., disoriented, slurred speech)   Negative: Visible sweat on face or sweat dripping down face   Negative: Unable to walk, or can only walk with assistance (e.g., requires support)   Negative: Received SHOCK from implantable cardiac defibrillator and has persisting symptoms (i.e., palpitations, lightheadedness)   Negative: Dizziness, lightheadedness, or weakness and heart beating very rapidly (e.g., > 140 / minute)   Negative: Dizziness, lightheadedness, or weakness and heart beating  "very slowly (e.g., < 50 / minute)   Negative: Sounds like a life-threatening emergency to the triager   Negative: Chest pain   Negative: Difficulty breathing   Negative: Dizziness, lightheadedness, or weakness   Negative: Heart beating very rapidly (e.g., > 140 / minute) and present now  (Exception: During exercise.)   Negative: Heart beating very slowly (e.g., < 50 / minute)  (Exception: Athlete and heart rate normal for caller.)   Negative: New or worsened shortness of breath with activity (dyspnea on exertion)   Negative: Patient sounds very sick or weak to the triager   Negative: Wearing a 'Holter monitor' or 'cardiac event monitor'   Negative: Received SHOCK from implantable cardiac defibrillator (and now feels well)   Negative: Heart beating very rapidly (e.g., > 140 / minute) and not present now  (Exception: During exercise.)    Answer Assessment - Initial Assessment Questions  1. DESCRIPTION: \"Please describe your heart rate or heartbeat that you are having\" (e.g., fast/slow, regular/irregular, skipped or extra beats, \"palpitations\")      Palpitations, rate has been between 75 to over 100 (108 or 109), uses Apple Watch  2. ONSET: \"When did it start?\" (Minutes, hours or days)       For a few weeks- recent ED visit  3. DURATION: \"How long does it last\" (e.g., seconds, minutes, hours)      Between 1 minute to 5 minutes or longer  4. PATTERN \"Does it come and go, or has it been constant since it started?\"  \"Does it get worse with exertion?\"   \"Are you feeling it now?\"      Comes and goes, on average somewhere between 10-20 times per day, not feeling right now, but will notice more if driving, doesn't get worse with exertion  5. TAP: \"Using your hand, can you tap out what you are feeling on a chair or table in front of you, so that I can hear?\" (Note: not all patients can do this)        Goes in a boom, ba boom ba boom, boom  6. HEART RATE: \"Can you tell me your heart rate?\" \"How many beats in 15 seconds?\"  " "(Note: not all patients can do this)        Rate is irregular, feels like there is an extra beat. BPM 97.   7. RECURRENT SYMPTOM: \"Have you ever had this before?\" If Yes, ask: \"When was the last time?\" and \"What happened that time?\"       no  8. CAUSE: \"What do you think is causing the palpitations?\"       Recent COVID this year, was suggested at ED that this could be the cause. Has diagnosis of MS.   9. CARDIAC HISTORY: \"Do you have any history of heart disease?\" (e.g., heart attack, angina, bypass surgery, angioplasty, arrhythmia)       No   10. OTHER SYMPTOMS: \"Do you have any other symptoms?\" (e.g., dizziness, chest pain, sweating, difficulty breathing)        No- will have uneasy feeling when palpitations start  11. PREGNANCY: \"Is there any chance you are pregnant?\" \"When was your last menstrual period?\"        NO, hysterectomy    Protocols used: Heart Rate and Heartbeat [id number]    "

## 2024-08-13 NOTE — TELEPHONE ENCOUNTER
Patient notified per Dr. Garner ok to be seen as scheduled for tomorrow. Routing telephone encounter to Dr. Garner for cosign. Tad Joseph, RN, BSN

## 2024-08-14 ENCOUNTER — VIRTUAL VISIT (OUTPATIENT)
Dept: FAMILY MEDICINE | Facility: CLINIC | Age: 49
End: 2024-08-14
Payer: COMMERCIAL

## 2024-08-14 DIAGNOSIS — R00.2 PALPITATIONS: Primary | ICD-10-CM

## 2024-08-14 DIAGNOSIS — E66.01 CLASS 2 SEVERE OBESITY WITH SERIOUS COMORBIDITY AND BODY MASS INDEX (BMI) OF 37.0 TO 37.9 IN ADULT, UNSPECIFIED OBESITY TYPE (H): ICD-10-CM

## 2024-08-14 DIAGNOSIS — E66.812 CLASS 2 SEVERE OBESITY WITH SERIOUS COMORBIDITY AND BODY MASS INDEX (BMI) OF 37.0 TO 37.9 IN ADULT, UNSPECIFIED OBESITY TYPE (H): ICD-10-CM

## 2024-08-14 DIAGNOSIS — R07.89 ATYPICAL CHEST PAIN: ICD-10-CM

## 2024-08-14 DIAGNOSIS — E66.9 OBESITY WITH BODY MASS INDEX (BMI) OF 35.0 TO 39.9 WITHOUT COMORBIDITY: ICD-10-CM

## 2024-08-14 PROCEDURE — 99214 OFFICE O/P EST MOD 30 MIN: CPT | Mod: 95 | Performed by: FAMILY MEDICINE

## 2024-08-14 PROCEDURE — G2211 COMPLEX E/M VISIT ADD ON: HCPCS | Mod: 95 | Performed by: FAMILY MEDICINE

## 2024-08-14 NOTE — PROGRESS NOTES
"  If patient has telephone visit, have they been educated on video visit as preferred visit method and offered to change to video visit? N/A        Instructions Relayed to Patient by Virtual Roomer:     Patient is active on [employer]:   Relayed following to patient: \"It looks like you are active on [employer], are you able to join the visit this way? If not, do you need us to send you a link now or would you like your provider to send a link via text or email when they are ready to initiate the visit?\"      Patient Confirmed they will join visit via: [employer]  Reminded patient to ensure they were logged on to virtual visit by arrival time listed.   Asked if patient has flexibility to initiate visit sooner than arrival time: patient stated yes, documented in appointment notes availability to initiate visit earlier than arrival time     If pediatric virtual visit, ensured pediatric patient along with parent/guardian will be present for video visit.     Patient offered the website [email or web address] and/or phone number to [employer] Help line: [phone]    Chayo is a 49 year old who is being evaluated via a billable video visit.    How would you like to obtain your AVS? LoterityharKofikafe  If the video visit is dropped, the invitation should be resent by: Text to cell phone: [phone]  Will anyone else be joining your video visit? No      Assessment & Plan     Palpitations  Reviewed documents and reports from [facility]  Lab results are normal except for slight low hemoglobin   patient is on iron supplement for now deficiency anemia status post gastric bypass  Reviewed negative D-dimer and troponin  Reviewed EKG showing sinus tachycardia  Continue with good hydration, proceed with Zio patch monitoring and echocardiogram for further evaluation due to persistent daily symptoms  If both results are negative, consider cardiology consult for further evaluation echocardiogram complete  Patient verbalised understanding and is " "agreeable to the plan.      - ZIO PATCH 3-7 DAYS (additional cost to patient); Future  - ZIO PATCH 3-7 DAYS APPLICATION; Future  -Echocardiogram complete    Atypical chest pain  as above  Patient symptoms resolved since the ER visit last month  She status post a cholecystectomy  Continue to monitor    Class 2 severe obesity with serious comorbidity and body mass index (BMI) of 37.0 to 37.9 in adult, unspecified obesity type (H)  Wt Readings from Last 5 Encounters:   07/17/24 97.5 kg (215 lb)   06/28/24 99.2 kg (218 lb 9.6 oz)   05/24/24 105.7 kg (233 lb)   04/25/24 104.8 kg (231 lb)   04/16/24 104.8 kg (231 lb)     Patient reported stopping the phentermine right after the ER visit due to palpitations  She is currently on semaglutide's and Topamax per endocrinology team          BMI  Estimated body mass index is 32.69 kg/m  as calculated from the following:    Height as of 7/17/24: 1.727 m (5' 8\").    Weight as of 7/17/24: 97.5 kg (215 lb).   Weight management plan: Patient referred to endocrine and/or weight management specialty      .Chart documentation done in part with Dragon Voice recognition Software. Although reviewed after completion, some word and grammatical error may remain.    See Patient Instructions    Subjective   Chayo is a 49 year old, presenting for the following health issues:  Patient is here for a video visit instead of in person visit due to the current COVID-19 pandemic.  Patient is new to the provider, is here today for a video visit with concerns of having daily palpitations  that of constant nature, started acutely on 7/16/2024, which woke patient up due to severe chest pain on the right side radiating to the right shoulder and right shoulder blade.  Patient was seen in the ER on that day, had labs done that were negative except for slightly low hemoglobin from his anemia  Patient had an EKG showing mild sinus tachycardia  Patient reported resolution of chest pain since ER visit but her " palpitations persisted now occurring almost on a daily basis for the past 3 weeks.  Denies lightheadedness, dizziness, chest pressure, syncope  Patient does not use alcohol, caffeinated drinks, tobacco  She works as a oncology nurse at the [employer]  Patient denies previous history of heart disease, palpitations underlying history of eye disorder,  She is currently on weight loss occasions managed by endocrinology team  After her ER visit, patient contacted her provider and stopped taking the phentermine due to palpitations.  Patient reports wearing Apple Watch that showed inconclusive EKG with no definite cardiac arrhythmia.  Patient has not surgical history of gastric bypass and cholecystectomy    Heart Problem (Palpitations, seen in ED needs Zio patch)      8/14/2024     1:31 PM   Additional Questions   Roomed by April GARCIA   Accompanied by Self         8/14/2024     1:31 PM   Patient Reported Additional Medications   Patient reports taking the following new medications None     Heart Problem    History of Present Illness       Vascular Disease:  She presents for follow up of vascular disease.     She never takes nitroglycerin. She is not taking daily aspirin.    She eats 4 or more servings of fruits and vegetables daily.She consumes 0 sweetened beverage(s) daily.She exercises with enough effort to increase her heart rate 30 to 60 minutes per day.  She exercises with enough effort to increase her heart rate 4 days per week.   She is taking medications regularly.               Review of Systems  CONSTITUTIONAL: As above  ENT/MOUTH: NEGATIVE for ear, mouth and throat problems  RESP: NEGATIVE for significant cough or SOB  CV: As above  GI: S/p cholecystectomy, bariatric bypass  MUSCULOSKELETAL: NEGATIVE for significant arthralgias or myalgia  NEURO: NEGATIVE for weakness, dizziness or paresthesias  ENDOCRINE: NEGATIVE for temperature intolerance, skin/hair changes  HEME/ALLERGY/IMMUNE: NEGATIVE for bleeding  problems  PSYCHIATRIC: NEGATIVE for changes in mood or affect      Objective           Vitals:  No vitals were obtained today due to virtual visit.    Physical Exam   GENERAL: alert and no distress  EYES: Eyes grossly normal to inspection  RESP: No audible wheeze, cough, or visible cyanosis.    NEURO: Cranial nerves grossly intact.  Mentation and speech appropriate for age.  PSYCH: Appropriate affect, tone, and pace of words    Chart forwarded to PCP for FYI         Video-Visit Details    Type of service:  Video Visit   Originating Location (pt. Location): Home    Distant Location (provider location):  Off-site  Platform used for Video Visit: [place]  Signed Electronically by: Carlo Horton MD

## 2024-08-15 ENCOUNTER — ALLIED HEALTH/NURSE VISIT (OUTPATIENT)
Dept: FAMILY MEDICINE | Facility: CLINIC | Age: 49
End: 2024-08-15
Payer: COMMERCIAL

## 2024-08-15 DIAGNOSIS — R00.2 PALPITATIONS: ICD-10-CM

## 2024-08-15 PROCEDURE — 93242 EXT ECG>48HR<7D RECORDING: CPT

## 2024-08-15 PROCEDURE — 99207 PR NO CHARGE NURSE ONLY: CPT

## 2024-08-20 RX ORDER — TOPIRAMATE 25 MG/1
TABLET, FILM COATED ORAL
Qty: 120 TABLET | Refills: 1 | Status: SHIPPED | OUTPATIENT
Start: 2024-08-20

## 2024-08-20 NOTE — TELEPHONE ENCOUNTER
Medication Requested:  topiramate (TOPAMAX) 25 MG tablet 120 tablet 3 2024 -- No   Simg (2 tablets) in the morning and 50mg (2 tablets) at night for a total of 100mg each day.     ----------------------  Last Office Visit : 2024  [facility] [place]      Future Office visit:    10/24/2024 4:30 PM (30 min)  [place]    Arrive by:  4:15 PM   RETURN WEIGHT MANAGEMENT    [facility]   Vanessa Mcclendon PA-C     ----------------------      Refill decision: Medication refilled per protocol up to next appt with  provider          Pass/Fail Protocol Criteria:  Last Comprehensive Metabolic Panel:  Lab Results   Component Value Date     2024    POTASSIUM 3.6 2024    CHLORIDE 107 2024    CO2 19 (L) 2024    ANIONGAP 12 2024    GLC 74 2024    BUN 12.9 2024    CR 0.77 2024    GFRESTIMATED >90 2024    BERNIE 9.0 2024

## 2024-08-23 ENCOUNTER — ANCILLARY PROCEDURE (OUTPATIENT)
Dept: CARDIOLOGY | Facility: CLINIC | Age: 49
End: 2024-08-23
Attending: FAMILY MEDICINE
Payer: COMMERCIAL

## 2024-08-23 DIAGNOSIS — R00.2 PALPITATIONS: ICD-10-CM

## 2024-08-23 DIAGNOSIS — R07.89 ATYPICAL CHEST PAIN: ICD-10-CM

## 2024-08-23 LAB — LVEF ECHO: NORMAL

## 2024-08-23 PROCEDURE — 93306 TTE W/DOPPLER COMPLETE: CPT | Performed by: INTERNAL MEDICINE

## 2024-08-28 PROCEDURE — 93244 EXT ECG>48HR<7D REV&INTERPJ: CPT | Performed by: INTERNAL MEDICINE

## 2024-08-29 DIAGNOSIS — R07.89 ATYPICAL CHEST PAIN: ICD-10-CM

## 2024-08-29 DIAGNOSIS — I49.3 PVC (PREMATURE VENTRICULAR CONTRACTION): ICD-10-CM

## 2024-08-29 DIAGNOSIS — R00.2 PALPITATIONS: Primary | ICD-10-CM

## 2024-08-29 NOTE — RESULT ENCOUNTER NOTE
Dionisio Domingo ,  Your heart monitor showed no significant sustained cardiac arrhythmias and your symptoms correlated with few nonconcerning premature ventricular contractions   I would recommend we consult cardiology to evaluate your palpitations further.  I placed a referral for you, please call  to schedule a consult.   Let me know if you have any questions. Take care.  Carlo Horton MD

## 2024-08-30 DIAGNOSIS — E66.01 CLASS 2 SEVERE OBESITY WITH SERIOUS COMORBIDITY AND BODY MASS INDEX (BMI) OF 37.0 TO 37.9 IN ADULT, UNSPECIFIED OBESITY TYPE (H): Primary | ICD-10-CM

## 2024-08-30 DIAGNOSIS — E66.812 CLASS 2 SEVERE OBESITY WITH SERIOUS COMORBIDITY AND BODY MASS INDEX (BMI) OF 37.0 TO 37.9 IN ADULT, UNSPECIFIED OBESITY TYPE (H): Primary | ICD-10-CM

## 2024-08-30 RX ORDER — SEMAGLUTIDE 1 MG/.5ML
1 INJECTION, SOLUTION SUBCUTANEOUS WEEKLY
Qty: 2 ML | Refills: 2 | Status: SHIPPED | OUTPATIENT
Start: 2024-08-30

## 2024-09-04 NOTE — TELEPHONE ENCOUNTER
RECORDS RECEIVED FROM:    DATE RECEIVED:    NOTES STATUS DETAILS   OFFICE NOTE from referring provider  Internal    OFFICE NOTE from other cardiologists  N/A    RECORDS from hospital/ED Internal 7-16-24   MEDICATION LIST Internal    GENERAL CARDIO RECORDS   (ALL APPOINTMENT TYPES)     LABS (CBC,BMP,CMP, TSH) Internal    EKG (STRIPS & REPORTS) Internal 7-16-24   MONITORS (STRIPS & REPORTS) Internal 8-28-24   ECHOS (IMAGES AND REPORTS) Internal 8-23-24   STRESS TESTS (IMAGES AND REPORTS) N/A    cMRI (IMAGES AND REPORTS) N/A    Cardiac cath (IMAGES AND REPORTS) N/A    CT/CTA (IMAGES AND REPORTS) N/A

## 2024-09-04 NOTE — PROGRESS NOTES
Cardiology Clinic Note  09/09/24    HPI  Ms. Chayo Nathan is a pleasant 49 year old female with a past medical history of MS, obesity s/p gastric bypass who presents for a new patient evaluation of palpitations.    The patient recently presented to her PCP for evaluation of palpitations on 8/14/24. She reports that these started around the time of an ED visit (7/16/24) for chest and R shoulder pain. EKG from this time demonstrated sinus tachycardia without ischemic changes. Labs were normal and there were no trop elevations. She was discharged from the ED but subsequently reported frequent palpitations. These occur on a daily basis. They are somewhat worsened with exertion and associated with SOB. She will occasionally have dizziness with this. No chest pain ever. No alleviating factors that she can identify. TTE 8/23/24 with normal biventricular function with frequent PVCs. A ziopatch was later applied. She denies any prior cardiac issues and has no family hx of cardiac disease. She does not smoke, drink, or use drugs. No energy drink use.     Ziopatch with one episode of 4 beat NSVT, PAC burden 6.3%, PVC 3.7%. Patient triggered events around PVCs/PACs    History of COVID 1/2022 and 6/2024.     Cardiac Medications:  - None    PAST MEDICAL HISTORY:  Patient Active Problem List   Diagnosis    Multiple sclerosis (H)    Obesity with body mass index (BMI) of 35.0 to 39.9 without comorbidity    Class 2 severe obesity with serious comorbidity and body mass index (BMI) of 37.0 to 37.9 in adult, unspecified obesity type (H)    History of gastric bypass        FAMILY HISTORY:  Family History   Problem Relation Age of Onset    No Known Problems Mother     Basal cell carcinoma Father     Dementia Father     Other - See Comments Father         wheel chair bound    Kidney Disease Father     Cancer Father         charcot's    Melanoma Sister     Obesity Sister     Diabetes Maternal Grandfather     Colon Cancer No family hx of      Breast Cancer No family hx of     Coronary Artery Disease No family hx of        SOCIAL HISTORY:  Social History     Tobacco Use    Smoking status: Never    Smokeless tobacco: Never   Vaping Use    Vaping status: Never Used   Substance Use Topics    Alcohol use: Yes     Comment: socially    Drug use: Never        ALLERGIES:  Allergies   Allergen Reactions    Bees Anaphylaxis    Ferumoxytol Difficulty breathing    Penicillin G Rash    Sulfa Antibiotics Rash       CURRENT MEDICATIONS:  Current Outpatient Medications   Medication Sig Dispense Refill    ferrous sulfate (FE TABS) 325 (65 Fe) MG EC tablet Take 1 tablet (325 mg) by mouth daily 90 tablet 3    multivitamin w/minerals (THERA-VIT-M) tablet Take 1 tablet by mouth daily      ocrelizumab (OCREVUS) 300 MG/10ML SOLN injection Every six months      ondansetron (ZOFRAN) 4 MG tablet Take one tablet every six hours for nausea during colonoscopy bowel prepping 3 tablet 0    Semaglutide-Weight Management (WEGOVY) 0.25 MG/0.5ML pen Inject 0.25 mg subcutaneously once a week For 4 weeks 2 mL 0    Semaglutide-Weight Management (WEGOVY) 0.5 MG/0.5ML pen Inject 0.5 mg subcutaneously once a week After completing 4 weeks of 0.25mg dose 2 mL 1    Semaglutide-Weight Management (WEGOVY) 1 MG/0.5ML pen Inject 1 mg subcutaneously once a week. 2 mL 2    topiramate (TOPAMAX) 25 MG tablet Take 2 tablets (50 mg) by mouth every morning AND 2 tablets (50 mg) every evening. (total of 100mg each day) 120 tablet 1    vitamin B-12 (CYANOCOBALAMIN) 2500 MCG sublingual tablet Take 2,500 mcg by mouth daily      vitamin D3 (CHOLECALCIFEROL) 41639 units (250 mcg) capsule Take one tablet every other day 15 capsule 11    vitamin C (ASCORBIC ACID) 1000 MG TABS Take 1,000 mg by mouth daily         ROS:   10-point ROS negative except for HPI above.     EXAM:  /85 (BP Location: Right arm, Patient Position: Chair, Cuff Size: Adult Large)   Pulse 87   Wt 99.6 kg (219 lb 9.6 oz)   LMP 08/28/2023  (Approximate)   SpO2 99%   BMI 33.39 kg/m      General: appears comfortable, alert and articulate  Head: normocephalic, atraumatic  Eyes: anicteric sclera, EOMI  Heart: Occasionally irregular rhythm, S1/S2, no murmur, gallop, rub  Lungs: clear, no rales or wheezing  Abdomen: soft  Extremities: no edema  Neurological: normal speech and affect, no gross motor deficits    Weight  Wt Readings from Last 10 Encounters:   09/09/24 99.6 kg (219 lb 9.6 oz)   07/17/24 97.5 kg (215 lb)   06/28/24 99.2 kg (218 lb 9.6 oz)   05/24/24 105.7 kg (233 lb)   04/25/24 104.8 kg (231 lb)   04/16/24 104.8 kg (231 lb)   04/01/24 105.7 kg (233 lb)   02/02/24 113.3 kg (249 lb 12.8 oz)   01/15/24 115.3 kg (254 lb 3.2 oz)   01/10/24 115.3 kg (254 lb 3.2 oz)       Labs:    CBC RESULTS:  Lab Results   Component Value Date    WBC 8.7 07/16/2024    WBC 9.2 07/21/2020    RBC 4.72 07/16/2024    RBC 4.60 07/21/2020    HGB 11.2 (L) 07/16/2024    HGB 8.7 (L) 07/21/2020    HCT 35.6 07/16/2024    HCT 30.1 (L) 07/21/2020    MCV 75 (L) 07/16/2024    MCV 65 (L) 07/21/2020    MCH 23.7 (L) 07/16/2024    MCH 18.9 (L) 07/21/2020    MCHC 31.5 07/16/2024    MCHC 28.9 (L) 07/21/2020    RDW 15.7 (H) 07/16/2024    RDW 17.6 (H) 07/21/2020     07/16/2024     07/21/2020       CMP RESULTS:  Lab Results   Component Value Date     07/16/2024     07/21/2020    POTASSIUM 3.6 07/16/2024    POTASSIUM 3.9 04/14/2022    POTASSIUM 4.0 07/21/2020    CHLORIDE 107 07/16/2024    CHLORIDE 107 04/14/2022    CHLORIDE 110 (H) 07/21/2020    CO2 19 (L) 07/16/2024    CO2 27 04/14/2022    CO2 26 07/21/2020    ANIONGAP 12 07/16/2024    ANIONGAP 7 04/14/2022    ANIONGAP 5 07/21/2020    GLC 74 07/16/2024    GLC 89 04/14/2022     (H) 07/21/2020    BUN 12.9 07/16/2024    BUN 10 04/14/2022    BUN 13 07/21/2020    CR 0.77 07/16/2024    CR 0.65 07/21/2020    GFRESTIMATED >90 07/16/2024    GFRESTIMATED >90 07/21/2020    GFRESTBLACK >90 07/21/2020    BERNIE 9.0  07/16/2024    BERNIE 8.7 07/21/2020    BILITOTAL 0.2 07/16/2024    BILITOTAL 0.2 07/21/2020    ALBUMIN 4.3 07/16/2024    ALBUMIN 3.5 12/16/2022    ALBUMIN 3.4 07/21/2020    ALKPHOS 102 07/16/2024    ALKPHOS 82 07/21/2020    ALT 9 07/16/2024    ALT 24 07/21/2020    AST 20 07/16/2024    AST 20 07/21/2020      Testing/Procedures:  I personally visualized and interpreted:  TTE 8/23/24:  The patient's rhythm is NSR with frequent PVCs.  Global and regional left ventricular function is normal with an EF of 60-65%.  Global right ventricular function is normal.  No significant valvular abnormalities present.  Pulmonary hypertension is not present.  The inferior vena cava was normal in size with preserved respiratory  variability.  There is no prior study for direct comparison.    Assessment and Plan:   In summary, Ms. Chayo Nathan is a pleasant 49 year old female with a past medical history of MS, obesity s/p gastric bypass who presents for a new patient evaluation of palpitations.    Persistent palpitations since mid July. Chest pain proceeded this but ED work up was negative for ischemia. LVEF is normal but PVCs were noted on the rhythm strip. No present EKGs have documented the PVCs. She completed her ziopatch monitoring and has a relatively low PVC and PAC burden. However, she does have triggered events around these episodes suggesting that she is symptomatic. These can be medically treated with a BB to start, with upward titration as tolerated. If things do not improve can consider additional evaluation with imaging and or EP evaluation for antiarrythmic consideration.     We recommend the following management of the patients condition:    #PVCs  #PACs  #palpitations  - History and ziopatch evaluation consistent with symptomatic PACs/PVCs. These are a relatively low overall burden   - Avoid/reduce ETOH intake, caffeine intake, tobacco exposure, recreational drugs, stress.   - Start metoprolol tartrate 25 mg BID  -  Consider additional imaging or EP evaluation if symptoms persist.     The patient states understanding and is agreeable with plan.   Feel free to contact myself regarding questions or concerns.  It was a pleasure to see this patient today.    Seen and discussed with Dr. Herring.    Troy Meneses, PGY-6  Cardiovascular Disease Fellow

## 2024-09-09 ENCOUNTER — OFFICE VISIT (OUTPATIENT)
Dept: CARDIOLOGY | Facility: CLINIC | Age: 49
End: 2024-09-09
Attending: FAMILY MEDICINE
Payer: COMMERCIAL

## 2024-09-09 ENCOUNTER — PRE VISIT (OUTPATIENT)
Dept: CARDIOLOGY | Facility: CLINIC | Age: 49
End: 2024-09-09
Payer: COMMERCIAL

## 2024-09-09 VITALS
BODY MASS INDEX: 33.39 KG/M2 | SYSTOLIC BLOOD PRESSURE: 122 MMHG | OXYGEN SATURATION: 99 % | HEART RATE: 87 BPM | DIASTOLIC BLOOD PRESSURE: 85 MMHG | WEIGHT: 219.6 LBS

## 2024-09-09 DIAGNOSIS — R00.2 PALPITATIONS: ICD-10-CM

## 2024-09-09 DIAGNOSIS — R07.89 ATYPICAL CHEST PAIN: ICD-10-CM

## 2024-09-09 DIAGNOSIS — I49.3 PVC (PREMATURE VENTRICULAR CONTRACTION): ICD-10-CM

## 2024-09-09 PROCEDURE — 99203 OFFICE O/P NEW LOW 30 MIN: CPT | Mod: GC | Performed by: INTERNAL MEDICINE

## 2024-09-09 PROCEDURE — 99213 OFFICE O/P EST LOW 20 MIN: CPT | Performed by: INTERNAL MEDICINE

## 2024-09-09 RX ORDER — METOPROLOL TARTRATE 25 MG/1
25 TABLET, FILM COATED ORAL 2 TIMES DAILY
Qty: 180 TABLET | Refills: 3 | Status: SHIPPED | OUTPATIENT
Start: 2024-09-09

## 2024-09-09 ASSESSMENT — PAIN SCALES - GENERAL: PAINLEVEL: NO PAIN (0)

## 2024-09-09 NOTE — PATIENT INSTRUCTIONS
Patient Instructions:  It was a pleasure to see you in the cardiology clinic today.      If you have any questions, call  Pam Huggins RN, at [phone].   [facility].  To schedule an appointment or to leave a message for your Care Team Press #1  If you are a physician calling for another physician Press #2  For Billing Press #3  For Medical Records Press #4  We are encouraging the use of [employer] to communicate with your HealthCare Provider    Note the new medications: metoprolol tartrate 25 mg by mouth twice daily  Stop the following medications: none    The results from today include: none  Please follow up with Dr. Herring in six months      If you have an urgent need after hours (8:00 am to 4:30 pm) please call [phone] and ask for the cardiology fellow on call.

## 2024-09-09 NOTE — NURSING NOTE
Chief Complaint   Patient presents with    New Patient     New Cardiology- referral from Carlo Horton MD for atypical chest pain     Vitals were taken and medications reconciled.    Chuck Reeves, BRITTNEY  11:08 AM     
Abdominal myomectomy  08/15/2017    Active  ACHU4

## 2024-09-09 NOTE — LETTER
9/9/2024      RE: Chayo Nathan  [id number] [facility] [id number]       Dear Colleague,    Thank you for the opportunity to participate in the care of your patient, Chayo Nathan, at the [facility] [place] [facility]. Please see a copy of my visit note below.    Cardiology Clinic Note  09/09/24    HPI  Ms. Chayo Nathan is a pleasant 49 year old female with a past medical history of MS, obesity s/p gastric bypass who presents for a new patient evaluation of palpitations.    The patient recently presented to her PCP for evaluation of palpitations on 8/14/24. She reports that these started around the time of an ED visit (7/16/24) for chest and R shoulder pain. EKG from this time demonstrated sinus tachycardia without ischemic changes. Labs were normal and there were no trop elevations. She was discharged from the ED but subsequently reported frequent palpitations. These occur on a daily basis. They are somewhat worsened with exertion and associated with SOB. She will occasionally have dizziness with this. No chest pain ever. No alleviating factors that she can identify. TTE 8/23/24 with normal biventricular function with frequent PVCs. A ziopatch was later applied. She denies any prior cardiac issues and has no family hx of cardiac disease. She does not smoke, drink, or use drugs. No energy drink use.     Ziopatch with one episode of 4 beat NSVT, PAC burden 6.3%, PVC 3.7%. Patient triggered events around PVCs/PACs    History of COVID 1/2022 and 6/2024.     Cardiac Medications:  - None    PAST MEDICAL HISTORY:  Patient Active Problem List   Diagnosis     Multiple sclerosis (H)     Obesity with body mass index (BMI) of 35.0 to 39.9 without comorbidity     Class 2 severe obesity with serious comorbidity and body mass index (BMI) of 37.0 to 37.9 in adult, unspecified obesity type (H)     History of gastric bypass        FAMILY  HISTORY:  Family History   Problem Relation Age of Onset     No Known Problems Mother      Basal cell carcinoma Father      Dementia Father      Other - See Comments Father         wheel chair bound     Kidney Disease Father      Cancer Father         charcot's     Melanoma Sister      Obesity Sister      Diabetes Maternal Grandfather      Colon Cancer No family hx of      Breast Cancer No family hx of      Coronary Artery Disease No family hx of        SOCIAL HISTORY:  Social History     Tobacco Use     Smoking status: Never     Smokeless tobacco: Never   Vaping Use     Vaping status: Never Used   Substance Use Topics     Alcohol use: Yes     Comment: socially     Drug use: Never        ALLERGIES:  Allergies   Allergen Reactions     Bees Anaphylaxis     Ferumoxytol Difficulty breathing     Penicillin G Rash     Sulfa Antibiotics Rash       CURRENT MEDICATIONS:  Current Outpatient Medications   Medication Sig Dispense Refill     ferrous sulfate (FE TABS) 325 (65 Fe) MG EC tablet Take 1 tablet (325 mg) by mouth daily 90 tablet 3     multivitamin w/minerals (THERA-VIT-M) tablet Take 1 tablet by mouth daily       ocrelizumab (OCREVUS) 300 MG/10ML SOLN injection Every six months       ondansetron (ZOFRAN) 4 MG tablet Take one tablet every six hours for nausea during colonoscopy bowel prepping 3 tablet 0     Semaglutide-Weight Management (WEGOVY) 0.25 MG/0.5ML pen Inject 0.25 mg subcutaneously once a week For 4 weeks 2 mL 0     Semaglutide-Weight Management (WEGOVY) 0.5 MG/0.5ML pen Inject 0.5 mg subcutaneously once a week After completing 4 weeks of 0.25mg dose 2 mL 1     Semaglutide-Weight Management (WEGOVY) 1 MG/0.5ML pen Inject 1 mg subcutaneously once a week. 2 mL 2     topiramate (TOPAMAX) 25 MG tablet Take 2 tablets (50 mg) by mouth every morning AND 2 tablets (50 mg) every evening. (total of 100mg each day) 120 tablet 1     vitamin B-12 (CYANOCOBALAMIN) 2500 MCG sublingual tablet Take 2,500 mcg by mouth daily        vitamin D3 (CHOLECALCIFEROL) 39041 units (250 mcg) capsule Take one tablet every other day 15 capsule 11     vitamin C (ASCORBIC ACID) 1000 MG TABS Take 1,000 mg by mouth daily         ROS:   10-point ROS negative except for HPI above.     EXAM:  /85 (BP Location: Right arm, Patient Position: Chair, Cuff Size: Adult Large)   Pulse 87   Wt 99.6 kg (219 lb 9.6 oz)   LMP 08/28/2023 (Approximate)   SpO2 99%   BMI 33.39 kg/m      General: appears comfortable, alert and articulate  Head: normocephalic, atraumatic  Eyes: anicteric sclera, EOMI  Heart: Occasionally irregular rhythm, S1/S2, no murmur, gallop, rub  Lungs: clear, no rales or wheezing  Abdomen: soft  Extremities: no edema  Neurological: normal speech and affect, no gross motor deficits    Weight  Wt Readings from Last 10 Encounters:   09/09/24 99.6 kg (219 lb 9.6 oz)   07/17/24 97.5 kg (215 lb)   06/28/24 99.2 kg (218 lb 9.6 oz)   05/24/24 105.7 kg (233 lb)   04/25/24 104.8 kg (231 lb)   04/16/24 104.8 kg (231 lb)   04/01/24 105.7 kg (233 lb)   02/02/24 113.3 kg (249 lb 12.8 oz)   01/15/24 115.3 kg (254 lb 3.2 oz)   01/10/24 115.3 kg (254 lb 3.2 oz)       Labs:    CBC RESULTS:  Lab Results   Component Value Date    WBC 8.7 07/16/2024    WBC 9.2 07/21/2020    RBC 4.72 07/16/2024    RBC 4.60 07/21/2020    HGB 11.2 (L) 07/16/2024    HGB 8.7 (L) 07/21/2020    HCT 35.6 07/16/2024    HCT 30.1 (L) 07/21/2020    MCV 75 (L) 07/16/2024    MCV 65 (L) 07/21/2020    MCH 23.7 (L) 07/16/2024    MCH 18.9 (L) 07/21/2020    MCHC 31.5 07/16/2024    MCHC 28.9 (L) 07/21/2020    RDW 15.7 (H) 07/16/2024    RDW 17.6 (H) 07/21/2020     07/16/2024     07/21/2020       CMP RESULTS:  Lab Results   Component Value Date     07/16/2024     07/21/2020    POTASSIUM 3.6 07/16/2024    POTASSIUM 3.9 04/14/2022    POTASSIUM 4.0 07/21/2020    CHLORIDE 107 07/16/2024    CHLORIDE 107 04/14/2022    CHLORIDE 110 (H) 07/21/2020    CO2 19 (L) 07/16/2024    CO2 27  04/14/2022    CO2 26 07/21/2020    ANIONGAP 12 07/16/2024    ANIONGAP 7 04/14/2022    ANIONGAP 5 07/21/2020    GLC 74 07/16/2024    GLC 89 04/14/2022     (H) 07/21/2020    BUN 12.9 07/16/2024    BUN 10 04/14/2022    BUN 13 07/21/2020    CR 0.77 07/16/2024    CR 0.65 07/21/2020    GFRESTIMATED >90 07/16/2024    GFRESTIMATED >90 07/21/2020    GFRESTBLACK >90 07/21/2020    BERNIE 9.0 07/16/2024    BERNIE 8.7 07/21/2020    BILITOTAL 0.2 07/16/2024    BILITOTAL 0.2 07/21/2020    ALBUMIN 4.3 07/16/2024    ALBUMIN 3.5 12/16/2022    ALBUMIN 3.4 07/21/2020    ALKPHOS 102 07/16/2024    ALKPHOS 82 07/21/2020    ALT 9 07/16/2024    ALT 24 07/21/2020    AST 20 07/16/2024    AST 20 07/21/2020      Testing/Procedures:  I personally visualized and interpreted:  TTE 8/23/24:  The patient's rhythm is NSR with frequent PVCs.  Global and regional left ventricular function is normal with an EF of 60-65%.  Global right ventricular function is normal.  No significant valvular abnormalities present.  Pulmonary hypertension is not present.  The inferior vena cava was normal in size with preserved respiratory  variability.  There is no prior study for direct comparison.    Assessment and Plan:   In summary, Ms. Chayo Nathan is a pleasant 49 year old female with a past medical history of MS, obesity s/p gastric bypass who presents for a new patient evaluation of palpitations.    Persistent palpitations since mid July. Chest pain proceeded this but ED work up was negative for ischemia. LVEF is normal but PVCs were noted on the rhythm strip. No present EKGs have documented the PVCs. She completed her ziopatch monitoring and has a relatively low PVC and PAC burden. However, she does have triggered events around these episodes suggesting that she is symptomatic. These can be medically treated with a BB to start, with upward titration as tolerated. If things do not improve can consider additional evaluation with imaging and or EP evaluation for  antiarrythmic consideration.     We recommend the following management of the patients condition:    #PVCs  #PACs  #palpitations  - History and ziopatch evaluation consistent with symptomatic PACs/PVCs. These are a relatively low overall burden   - Avoid/reduce ETOH intake, caffeine intake, tobacco exposure, recreational drugs, stress.   - Start metoprolol tartrate 25 mg BID  - Consider additional imaging or EP evaluation if symptoms persist.     The patient states understanding and is agreeable with plan.   Feel free to contact myself regarding questions or concerns.  It was a pleasure to see this patient today.    Seen and discussed with Dr. Herring.    Troy Meneses, PGY-6  Cardiovascular Disease Fellow        Attestation signed by Michael Herring MD at 9/9/2024  1:27 PM:  ATTENDING ATTESTATION:   I personally examined and evaluated this patient on September 9, 2024.  I have personally reviewed today's vital signs, medications, all labs, and all imaging/cardiac studies described above. I have reviewed and edited, as necessary, the history, review of systems, physical examination, and assessment and plan.  I discussed the patient with Dr. Meneses and agree with the assessment and plan of care as documented in the note above.  I personally spent 30 min today reviewing the medical record, meeting with the patient, and completing this note.  Thank you for allowing us to take part in the care of this very pleasant patient.  Please do not hesitate to call if any further questions or concerns arise.    Michael Herring MD, PhD  Interventional/Critical Care Cardiology  [phone]    September 9, 2024      Please do not hesitate to contact me if you have any questions/concerns.     Sincerely,     Michael Herring MD

## 2024-09-22 ENCOUNTER — HEALTH MAINTENANCE LETTER (OUTPATIENT)
Age: 49
End: 2024-09-22

## 2024-10-22 ENCOUNTER — TELEPHONE (OUTPATIENT)
Dept: NEUROLOGY | Facility: CLINIC | Age: 49
End: 2024-10-22
Payer: COMMERCIAL

## 2024-10-22 NOTE — TELEPHONE ENCOUNTER
Left Voicemail (1st Attempt) and Sent HEROZhart (1st Attempt) for the patient to call back and schedule the following:    Appointment type: Return MS  Provider: Dr. Love  Return date: Jan 2025 @ [facility] or Feb 2025 @   Specialty phone number: [phone]  Additional appointment(s) needed:   Additonal Notes:     Attempted to reschedule the appointment with Dr. Cleary on 11/4/2024, Provider out for Admin reasons. Ok to reschedule to the [facility] on 1/7/2025 or other dates on hold at the [facility]. May also schedule at  on 2/3/2025.    Also sent a [employer] message.    Lorelei HARTLEY/Complex Procedure    [facility]   Neurology, NeuroSurgery, NeuroPsychology, Pain Management and Cardiology Specialties  Medical/Surgical Adult Specialties

## 2024-10-24 ENCOUNTER — VIRTUAL VISIT (OUTPATIENT)
Dept: ENDOCRINOLOGY | Facility: CLINIC | Age: 49
End: 2024-10-24
Payer: COMMERCIAL

## 2024-10-24 VITALS — HEIGHT: 68 IN | BODY MASS INDEX: 31.67 KG/M2 | WEIGHT: 209 LBS

## 2024-10-24 DIAGNOSIS — E66.01 CLASS 2 SEVERE OBESITY WITH SERIOUS COMORBIDITY AND BODY MASS INDEX (BMI) OF 37.0 TO 37.9 IN ADULT, UNSPECIFIED OBESITY TYPE (H): ICD-10-CM

## 2024-10-24 DIAGNOSIS — E66.9 OBESITY WITH BODY MASS INDEX (BMI) OF 35.0 TO 39.9 WITHOUT COMORBIDITY: ICD-10-CM

## 2024-10-24 DIAGNOSIS — E66.812 CLASS 2 SEVERE OBESITY WITH SERIOUS COMORBIDITY AND BODY MASS INDEX (BMI) OF 37.0 TO 37.9 IN ADULT, UNSPECIFIED OBESITY TYPE (H): ICD-10-CM

## 2024-10-24 PROCEDURE — 99213 OFFICE O/P EST LOW 20 MIN: CPT | Mod: 95

## 2024-10-24 PROCEDURE — G2211 COMPLEX E/M VISIT ADD ON: HCPCS | Mod: 95

## 2024-10-24 RX ORDER — SEMAGLUTIDE 1 MG/.5ML
1 INJECTION, SOLUTION SUBCUTANEOUS WEEKLY
Qty: 2 ML | Refills: 2 | Status: CANCELLED | OUTPATIENT
Start: 2024-10-24

## 2024-10-24 RX ORDER — TOPIRAMATE 25 MG/1
TABLET, FILM COATED ORAL
Qty: 120 TABLET | Refills: 1 | Status: SHIPPED | OUTPATIENT
Start: 2024-10-24

## 2024-10-24 RX ORDER — SEMAGLUTIDE 1.7 MG/.75ML
1.7 INJECTION, SOLUTION SUBCUTANEOUS WEEKLY
Qty: 3 ML | Refills: 3 | Status: SHIPPED | OUTPATIENT
Start: 2024-10-24

## 2024-10-24 ASSESSMENT — PAIN SCALES - GENERAL: PAINLEVEL_OUTOF10: NO PAIN (0)

## 2024-10-24 NOTE — TELEPHONE ENCOUNTER
Left Voicemail (2nd Attempt) and Sent Mychart (2nd Attempt) for the patient to call back and schedule the following:    Appointment type: Return MS  Provider: Dr. Love  Return date: Jan 2025 @ [facility] or Feb 2025 @   Specialty phone number: [phone]  Additional appointment(s) needed:   Additonal Notes:     2nd attempt to reschedule the appointment with Dr. Cleary on 11/4/2024, Provider out for Admin reasons. Ok to reschedule to the [facility] on 1/7/2025 or other dates on hold at the [facility]. May also schedule at  on 2/3/2025.    Also sent a [employer] message and an appointment reschedule letter.    Lorelei HARTLEY/[place] Procedure    [facility]   Neurology, NeuroSurgery, NeuroPsychology, Pain Management and Cardiology Specialties  Medical/Surgical Adult Specialties

## 2024-10-24 NOTE — PATIENT INSTRUCTIONS
"Thank you for allowing us the privilege of caring for you. We hope we provided you with the excellent service you deserve.   Please let us know if there is anything else we can do for you so that we can be sure you are completely satisfied with your care experience.    To ensure the quality of our services you may be receiving a patient satisfaction survey from an independent patient satisfaction monitoring company.    The greatest compliment you can give is a \"Likely to Recommend\"    Your visit was with Vanessa Mcclendon PA-C today.    Instructions per today's visit:     Dionisio Nathan, it was great to visit with you today.  Here is a review of our visit.  If our clinic scheduler is not able to reach you please call [phone] to schedule your next appointments.    Plan  Increase wegovy to 1.7mg, prescription sent   Continue topiramate at current dose   If wegovy not covered by insurance in the new year- will consider compounded semaglutide   Goals we discussed today:   Keep up excellent momentum with diet and exercise changes   Follow up with Vanessa in 3 months   Dietician appointment as needed   Keep up the excellent work!       Information about Video Visits with [employer]: video visit information  _________________________________________________________________________________________________________________________________________________________  If you are asked by your clinic team to have your blood pressure checked:  [facility] do offer several locations for blood pressure checks. Please follow the below link to schedule an appointment. Scheduling an appointment at the pharmacy for a blood pressure check is now preferred.    Appointment Plus (appointment-[email or web address].[email or web address])  _________________________________________________________________________________________________________________________________________________________  Important contact and scheduling information:  Please call our " contact center at [phone] to schedule your next appointments.  To find a lab location near you, please call [phone].  For any nursing questions or concerns call Hayley Zamora LPN at [phone] or Xiomy Neil RN at [phone]  Please call during clinic hours Monday through Friday 8:00a - 4:00p if you have questions or you can contact us via My 1%hart at anytime and we will reply during clinic hours.    Lab results will be communicated through My Chart or letter (if My Chart not used). Please call the clinic if you have not received communication after 1 week or if you have any questions.?  Clinic Fax: [phone]    _________________________________________________________________________________________________________________________________________________________  Meal Replacement Products:    Here is the link to our new e-store where you can purchase our meal replacement products    [facility] E-Store  [email or web address]    The one week starter kit is a great way to sample a variety of products and see what works for you.    If you want more information about the product go to: [employer]    If you are an employee or [facility] or [facility] please contact your care team for a 10% estore discount    Free Shipping for orders over $75     Benefits of meal replacements products:    Portion and calorie control  Improved nutrition  Structured eating  Simplified food choices  Avoid contact with trigger foods  _________________________________________________________________________________________________________________________________________________________  Interested in working with a health ?  Health coaches work with you to improve your overall health and wellbeing.  They look at the whole person, and may involve discussion of different areas of life, including, but not limited to the four pillars of health (sleep,  exercise, nutrition, and stress management). Discuss with your care team if you would like to start working a health .  Health Coaching-3 Pack: Schedule by calling [phone]    $99 for three health coaching visits    Visits may be done in person or via phone    Coaching is a partnership between the  and the client; Coaches do not prescribe or diagnose    Coaching helps inspire the client to reach his/her personal goals   _________________________________________________________________________________________________________________________________________________________  24 Week Healthy Lifestyle Plan:    Our mission in the 24-week Healthy Lifestyle Plan is to provide you with individualized care by giving you the tools, education and support you need to lose weight and maintain a healthy lifestyle. In your 24-week journey, you ll be supported by a dedicated weight loss team that includes registered dietitians, medical weight management providers, health coaches, and nurses -- all with special expertise in weight loss -- to help you every step of the way.     Monthly meetings with your registered dietician or medical weight management provider help to review your progress, update your care plan, and make any adjustments needed to ensure success. Between these visits, weekly and bi-weekly health  visits will help you focus on the four pillars of weight loss -- stress, sleep, nutrition, and exercise -- and how you can best adapt each to achieve sustainable weight loss results.    In addition, you will be given exclusive access to online wellbeing classes through [employer].  Your initial visit will be with a medical weight management provider who will help to understand your weight loss goals and ensure this program is the right fit for you. Please let our team know if you are interested in the 24 week plan by sending a message to your care team or calling [phone] to  schedule.  _________________________________________________________________________________________________________________________________________________________  __________  [facility] Get Moving Program  Our team of physical therapists is trained to help you understand and take control of your condition. They will perform a thorough evaluation to determine your ability for activity and develop a customized plan to fit your goals and physical ability.  Scheduling: Unsure if the Get Moving program is right for you? Discuss the program with your medical provider or diabetes educator. You can also call us at [phone] to ask questions or schedule an appointment.   [facility] Get Moving Program  ____________________________________________________________________________________________________________________________________________________________________________   [facility] [employer] [facility] Diabetes Prevention Program (DPP)  If you have prediabetes and Medicare please contact us via [employer] to learn more about the Diabetes Prevention Program (DPP)  Program Details:   [facility] [facility] [facility] offers the year-long Diabetes Prevention Program (DPP). The program helps you to make lifestyle changes that prevent or delay type 2 diabetes by supporting healthy eating, increased physical activity, stress reduction and use of coping skills.   On average, previous [facility] DPP cohorts have lost and maintained at least 5% of their starting weight throughout the program and averaged more than 150 minutes of physical activity per week.  Participants meet weekly for one-hour group sessions over sixteen weeks, every other week for the next 8 weeks, and monthly for the last six months.   A year-long maintenance program is also available for participants who complete the first year.   Location & Cost:   During the COVID-19 Public Health Emergency, the program is offered virtually. When in-person classes can resume, they  will be held at [facility].  For people with Medicare, the program is covered in full. A self-pay option will also be available for those with non-Medicare insurance plans.   ______________________________________________________________________________________________________________________________________________________________________________________________________________________________    To work with a Behavioral Health Psychologist:    Call to schedule:    Cain Arndt - [phone]  Mrita Bahena - [phone]  Siria Valdez - [phone]  Eduarda Hairston - [phone]   Clarisse Jackson PhD (cannot accept Medicare) [phone]        Thank you,   [facility] Comprehensive Weight Management Team

## 2024-10-24 NOTE — NURSING NOTE
Current patient location: [address] [facility] [address]    Is the patient currently in the state of [place]? YES    Visit mode:VIDEO    If the visit is dropped, the patient can be reconnected by: VIDEO VISIT: Text to cell phone:   Telephone Information:   Mobile [phone]       Will anyone else be joining the visit? NO  (If patient encounters technical issues they should call [phone]    Are changes needed to the allergy or medication list? No    Are refills needed on medications prescribed by this physician? YES    Rooming Documentation:  Questionnaire(s) completed    Reason for visit: RECHECK    Stephanie PEÑA

## 2024-10-24 NOTE — LETTER
10/24/2024       RE: Chayo Nathan  [address] [facility] [address]     Dear Colleague,    Thank you for referring your patient, Chayo Nathan, to the [facility] WEIGHT MANAGEMENT CLINIC [place] at [facility]. Please see a copy of my visit note below.    Virtual Visit Details    Type of service:  Video Visit     Originating Location (pt. Location): Home    Distant Location (provider location):  Off-site  Platform used for Video Visit: [facility] Medical Weight Management Note     Chayo Nathan  MRN:  [id number]  :  1975  VIOLET:  10/24/2024    Dear Shawna Garcia PA-C,    I had the pleasure of seeing your patient Chayo Nathan. She is a 49 year old female who I am continuing to see for treatment of obesity related to:        2024     2:39 PM   --   I have the following health issues associated with obesity Stress Incontinence   I have the following symptoms associated with obesity Knee Pain    Lower Extremity Swelling    Fatigue    Groin Rash       Assessment & Plan  Problem List Items Addressed This Visit       Class 2 severe obesity with serious comorbidity and body mass index (BMI) of 37.0 to 37.9 in adult, unspecified obesity type (H)    Relevant Medications    topiramate (TOPAMAX) 25 MG tablet    Semaglutide-Weight Management (WEGOVY) 1.7 MG/0.75ML pen    Obesity with body mass index (BMI) of 35.0 to 39.9 without comorbidity    Relevant Medications    topiramate (TOPAMAX) 25 MG tablet    Semaglutide-Weight Management (WEGOVY) 1.7 MG/0.75ML pen      Plan  Increase wegovy to 1.7mg, prescription sent   Continue topiramate at current dose   If wegovy not covered by insurance in the new year- will consider compounded semaglutide   Goals we discussed today:   Keep up excellent momentum with diet and exercise changes   Follow up with Vanessa in 3 months   Dietician appointment as needed   Keep up the excellent work!  "      INTERVAL HISTORY:  RYGB 2007   Patient with joint pain, fatigue. Additionally diagnosed with MS in 2018, managed by neurologist Dr. Love, is currently getting an infusion for this once every 6 months. Only current MS symptom she experiences is an occasional left foot drop. Additionally has dealt with iron deficiency anemia over the last several years, this has been determined to be in part due to history of rygb and also heavy menstrual cycles. Had a hysterectomy last year and hopes this has resolved these issues, also takes multivitamin with iron.      New MWM with me 2/2/24- had started phentermine 37.5mg 1 month prior through pcp. We started topiramate in addition.      seen by me 4/16/24:   Has seen 18lbs weight loss over the last 2 months.  Reports things are going well, has seen some plateauing over the last few weeks.   Feels clothes are fitting better.     Discussed working in weight training to her week to help break through plateau.      Could consider adding contrave in the future.      Was unable to see RD, will schedule as needed moving forward.     Could consider wegovy in the future, Chayo reports her insurance requires her to first try qsymia and contrave.      Last seen by me 7/17/24:   -had covid 3 weeks ago, feeling better from this   -in ED yesterday (7/16/24) due to palpitations, shortness of breath, right sided chest pain going up to right shoulder and right neck, dizziness, nausea. D-dimer negative, chest x ray negative. EKG positive for sinus tach (101 bpm)     -feeling better today, describes yesterday her heart was doing \"acrobatics,\" wonders if palpitations were triggered from covid.  Discussed a plan to discontinue phentermine out of an abundance of caution, however it is not clear if phentermine was at the root of the symptoms.     -plateau lately for the last few weeks      -interested in changing medications.   -started wegovy, stopped phentermine out of an abundance of " caution    Today in visit 10/24/24   6lbs weight loss since last visit   Approximately 50lbs weight loss since starting AOMs. Feeling excellent with weight loss, down several pant sizes     Feeling great overall with her weight loss.   Started lopressor due to persistent arrhythmias through cardiology.     Has dual insurance: medica as well as  coverage- anticipates  will continue to cover wegovy into 2025.       Wt Readings from Last 5 Encounters:   10/24/24 94.8 kg (209 lb)   09/09/24 99.6 kg (219 lb 9.6 oz)   07/17/24 97.5 kg (215 lb)   06/28/24 99.2 kg (218 lb 9.6 oz)   05/24/24 105.7 kg (233 lb)       Anti-obesity medication history    Current:   Wegovy 1mg-weight loss is more gradual lately, some return of sugar cravings in the most recent weeks.   Topiramate 50mg BID- continuing to tolerate, sometimes forgets to take morning dose. Discussed it is appropriate to take all of it at once if it helps with med adherence. Really helpful with reducing pop cravings.       Past/Failed/contraindicated:       GERD symptoms: denies     Constipation/Diarrhea: single episode of diarrhea once a week seeming to occur consistently 3 days after injection, overall tolerable for her     Recent diet changes: continuing to reduce portion size, increasing protein. Having protein first. Protein shake, protein yogurt in the morning.     Recent exercise/activity changes: strength training/ planks at home. Walks dogs a lot- 3 miles day.     Recent stressors: some worsened stress with  getting a new job, going through interviews right now. Otherwise no major conerns.     Vitamins/Labs: due for post op labs February 2025        CURRENT WEIGHT:   209 lbs 0 oz    Initial Weight (lbs): 254 lbs  Last Visits Weight: 97.5 kg (215 lb)  Cumulative weight loss (lbs): 45  Weight Loss Percentage: 17.72%        10/24/2024     3:56 PM   Changes and Difficulties   I have made the following changes to my diet since my last visit:  Increased protein   With regards to my diet, I am still struggling with: Craving sugar at times   I have made the following changes to my activity/exercise since my last visit: Increased walking and added strength training             MEDICATIONS:   Current Outpatient Medications   Medication Sig Dispense Refill     Semaglutide-Weight Management (WEGOVY) 1.7 MG/0.75ML pen Inject 1.7 mg subcutaneously once a week. 3 mL 3     topiramate (TOPAMAX) 25 MG tablet Take 2 tablets (50 mg) by mouth every morning AND 2 tablets (50 mg) every evening. (total of 100mg each day). 120 tablet 1     ferrous sulfate (FE TABS) 325 (65 Fe) MG EC tablet Take 1 tablet (325 mg) by mouth daily 90 tablet 3     metoprolol tartrate (LOPRESSOR) 25 MG tablet Take 1 tablet (25 mg) by mouth 2 times daily. 180 tablet 3     multivitamin w/minerals (THERA-VIT-M) tablet Take 1 tablet by mouth daily       ocrelizumab (OCREVUS) 300 MG/10ML SOLN injection Every six months       ondansetron (ZOFRAN) 4 MG tablet Take one tablet every six hours for nausea during colonoscopy bowel prepping 3 tablet 0     Semaglutide-Weight Management (WEGOVY) 0.25 MG/0.5ML pen Inject 0.25 mg subcutaneously once a week For 4 weeks 2 mL 0     Semaglutide-Weight Management (WEGOVY) 0.5 MG/0.5ML pen Inject 0.5 mg subcutaneously once a week After completing 4 weeks of 0.25mg dose 2 mL 1     Semaglutide-Weight Management (WEGOVY) 1 MG/0.5ML pen Inject 1 mg subcutaneously once a week. 2 mL 2     vitamin B-12 (CYANOCOBALAMIN) 2500 MCG sublingual tablet Take 2,500 mcg by mouth daily       vitamin C (ASCORBIC ACID) 1000 MG TABS Take 1,000 mg by mouth daily       vitamin D3 (CHOLECALCIFEROL) 00265 units (250 mcg) capsule Take one tablet every other day 15 capsule 11           10/24/2024     3:56 PM   Weight Loss Medication History Reviewed With Patient   Which weight loss medications are you currently taking on a regular basis? Topamax (topiramate)    Wegovy   Are you having any side  "effects from the weight loss medication that we have prescribed you? No                No data to display                  PHYSICAL EXAM:  Objective   Ht 1.727 m (5' 7.99\")   Wt 94.8 kg (209 lb)   LMP 08/28/2023 (Approximate)   BMI 31.79 kg/m      Vitals - Patient Reported  Pain Score: No Pain (0)      Vitals:  No vitals were obtained today due to virtual visit.    GENERAL: alert and no distress  EYES: Eyes grossly normal to inspection.  No discharge or erythema, or obvious scleral/conjunctival abnormalities.  RESP: No audible wheeze, cough, or visible cyanosis.    SKIN: Visible skin clear. No significant rash, abnormal pigmentation or lesions.  NEURO: Cranial nerves grossly intact.  Mentation and speech appropriate for age.  PSYCH: Appropriate affect, tone, and pace of words        Sincerely,    Vanessa Mcclendon PA-C      21 minutes spent by me on the date of the encounter doing chart review, history and exam, documentation and further activities per the note    The longitudinal plan of care for the diagnosis(es)/condition(s) as documented were addressed during this visit. Due to the added complexity in care, I will continue to support Chayo in the subsequent management and with ongoing continuity of care.       Again, thank you for allowing me to participate in the care of your patient.      Sincerely,    Vanessa Mcclendon PA-C    "

## 2024-10-24 NOTE — PROGRESS NOTES
Virtual Visit Details    Type of service:  Video Visit     Originating Location (pt. Location): Home    Distant Location (provider location):  Off-site  Platform used for Video Visit: [facility] Medical Weight Management Note     Chayo Nathan  MRN:  [id number]  :  1975  VIOLET:  10/24/2024    Dear Shawna Garcia PA-C,    I had the pleasure of seeing your patient Chayo Nathan. She is a 49 year old female who I am continuing to see for treatment of obesity related to:        2024     2:39 PM   --   I have the following health issues associated with obesity Stress Incontinence   I have the following symptoms associated with obesity Knee Pain    Lower Extremity Swelling    Fatigue    Groin Rash       Assessment & Plan   Problem List Items Addressed This Visit       Class 2 severe obesity with serious comorbidity and body mass index (BMI) of 37.0 to 37.9 in adult, unspecified obesity type (H)    Relevant Medications    topiramate (TOPAMAX) 25 MG tablet    Semaglutide-Weight Management (WEGOVY) 1.7 MG/0.75ML pen    Obesity with body mass index (BMI) of 35.0 to 39.9 without comorbidity    Relevant Medications    topiramate (TOPAMAX) 25 MG tablet    Semaglutide-Weight Management (WEGOVY) 1.7 MG/0.75ML pen      Plan  Increase wegovy to 1.7mg, prescription sent   Continue topiramate at current dose   If wegovy not covered by insurance in the new year- will consider compounded semaglutide   Goals we discussed today:   Keep up excellent momentum with diet and exercise changes   Follow up with Vanessa in 3 months   Dietician appointment as needed   Keep up the excellent work!       INTERVAL HISTORY:  RYGB 2007   Patient with joint pain, fatigue. Additionally diagnosed with MS in 2018, managed by neurologist Dr. Love, is currently getting an infusion for this once every 6 months. Only current MS symptom she experiences is an occasional left foot drop. Additionally has dealt with iron deficiency  "anemia over the last several years, this has been determined to be in part due to history of rygb and also heavy menstrual cycles. Had a hysterectomy last year and hopes this has resolved these issues, also takes multivitamin with iron.      New MWM with me 2/2/24- had started phentermine 37.5mg 1 month prior through pcp. We started topiramate in addition.      seen by me 4/16/24:   Has seen 18lbs weight loss over the last 2 months.  Reports things are going well, has seen some plateauing over the last few weeks.   Feels clothes are fitting better.     Discussed working in weight training to her week to help break through plateau.      Could consider adding contrave in the future.      Was unable to see RD, will schedule as needed moving forward.     Could consider wegovy in the future, Chayo reports her insurance requires her to first try qsymia and contrave.      Last seen by me 7/17/24:   -had covid 3 weeks ago, feeling better from this   -in ED yesterday (7/16/24) due to palpitations, shortness of breath, right sided chest pain going up to right shoulder and right neck, dizziness, nausea. D-dimer negative, chest x ray negative. EKG positive for sinus tach (101 bpm)     -feeling better today, describes yesterday her heart was doing \"acrobatics,\" wonders if palpitations were triggered from covid.  Discussed a plan to discontinue phentermine out of an abundance of caution, however it is not clear if phentermine was at the root of the symptoms.     -plateau lately for the last few weeks      -interested in changing medications.   -started wegovy, stopped phentermine out of an abundance of caution    Today in visit 10/24/24   6lbs weight loss since last visit   Approximately 50lbs weight loss since starting AOMs. Feeling excellent with weight loss, down several pant sizes     Feeling great overall with her weight loss.   Started lopressor due to persistent arrhythmias through cardiology.     Has dual insurance: " [employer] as well as  coverage- anticipates  will continue to cover wegovy into 2025.       Wt Readings from Last 5 Encounters:   10/24/24 94.8 kg (209 lb)   09/09/24 99.6 kg (219 lb 9.6 oz)   07/17/24 97.5 kg (215 lb)   06/28/24 99.2 kg (218 lb 9.6 oz)   05/24/24 105.7 kg (233 lb)       Anti-obesity medication history    Current:   Wegovy 1mg-weight loss is more gradual lately, some return of sugar cravings in the most recent weeks.   Topiramate 50mg BID- continuing to tolerate, sometimes forgets to take morning dose. Discussed it is appropriate to take all of it at once if it helps with med adherence. Really helpful with reducing pop cravings.       Past/Failed/contraindicated:       GERD symptoms: denies     Constipation/Diarrhea: single episode of diarrhea once a week seeming to occur consistently 3 days after injection, overall tolerable for her     Recent diet changes: continuing to reduce portion size, increasing protein. Having protein first. Protein shake, protein yogurt in the morning.     Recent exercise/activity changes: strength training/ planks at home. Walks dogs a lot- 3 miles day.     Recent stressors: some worsened stress with  getting a new job, going through interviews right now. Otherwise no major conerns.     Vitamins/Labs: due for post op labs February 2025        CURRENT WEIGHT:   209 lbs 0 oz    Initial Weight (lbs): 254 lbs  Last Visits Weight: 97.5 kg (215 lb)  Cumulative weight loss (lbs): 45  Weight Loss Percentage: 17.72%        10/24/2024     3:56 PM   Changes and Difficulties   I have made the following changes to my diet since my last visit: Increased protein   With regards to my diet, I am still struggling with: Craving sugar at times   I have made the following changes to my activity/exercise since my last visit: Increased walking and added strength training             MEDICATIONS:   Current Outpatient Medications   Medication Sig Dispense Refill     "Semaglutide-Weight Management (WEGOVY) 1.7 MG/0.75ML pen Inject 1.7 mg subcutaneously once a week. 3 mL 3    topiramate (TOPAMAX) 25 MG tablet Take 2 tablets (50 mg) by mouth every morning AND 2 tablets (50 mg) every evening. (total of 100mg each day). 120 tablet 1    ferrous sulfate (FE TABS) 325 (65 Fe) MG EC tablet Take 1 tablet (325 mg) by mouth daily 90 tablet 3    metoprolol tartrate (LOPRESSOR) 25 MG tablet Take 1 tablet (25 mg) by mouth 2 times daily. 180 tablet 3    multivitamin w/minerals (THERA-VIT-M) tablet Take 1 tablet by mouth daily      ocrelizumab (OCREVUS) 300 MG/10ML SOLN injection Every six months      ondansetron (ZOFRAN) 4 MG tablet Take one tablet every six hours for nausea during colonoscopy bowel prepping 3 tablet 0    Semaglutide-Weight Management (WEGOVY) 0.25 MG/0.5ML pen Inject 0.25 mg subcutaneously once a week For 4 weeks 2 mL 0    Semaglutide-Weight Management (WEGOVY) 0.5 MG/0.5ML pen Inject 0.5 mg subcutaneously once a week After completing 4 weeks of 0.25mg dose 2 mL 1    Semaglutide-Weight Management (WEGOVY) 1 MG/0.5ML pen Inject 1 mg subcutaneously once a week. 2 mL 2    vitamin B-12 (CYANOCOBALAMIN) 2500 MCG sublingual tablet Take 2,500 mcg by mouth daily      vitamin C (ASCORBIC ACID) 1000 MG TABS Take 1,000 mg by mouth daily      vitamin D3 (CHOLECALCIFEROL) 86270 units (250 mcg) capsule Take one tablet every other day 15 capsule 11           10/24/2024     3:56 PM   Weight Loss Medication History Reviewed With Patient   Which weight loss medications are you currently taking on a regular basis? Topamax (topiramate)    Wegovy   Are you having any side effects from the weight loss medication that we have prescribed you? No                No data to display                  PHYSICAL EXAM:  Objective    Ht 1.727 m (5' 7.99\")   Wt 94.8 kg (209 lb)   LMP 08/28/2023 (Approximate)   BMI 31.79 kg/m      Vitals - Patient Reported  Pain Score: No Pain (0)      Vitals:  No vitals were " obtained today due to virtual visit.    GENERAL: alert and no distress  EYES: Eyes grossly normal to inspection.  No discharge or erythema, or obvious scleral/conjunctival abnormalities.  RESP: No audible wheeze, cough, or visible cyanosis.    SKIN: Visible skin clear. No significant rash, abnormal pigmentation or lesions.  NEURO: Cranial nerves grossly intact.  Mentation and speech appropriate for age.  PSYCH: Appropriate affect, tone, and pace of words        Sincerely,    Vanessa Mcclendon PA-C      21 minutes spent by me on the date of the encounter doing chart review, history and exam, documentation and further activities per the note    The longitudinal plan of care for the diagnosis(es)/condition(s) as documented were addressed during this visit. Due to the added complexity in care, I will continue to support Chayo in the subsequent management and with ongoing continuity of care.

## 2024-11-01 ENCOUNTER — ANCILLARY PROCEDURE (OUTPATIENT)
Dept: MRI IMAGING | Facility: CLINIC | Age: 49
End: 2024-11-01
Attending: PSYCHIATRY & NEUROLOGY
Payer: COMMERCIAL

## 2024-11-01 DIAGNOSIS — G35 MS (MULTIPLE SCLEROSIS) (H): ICD-10-CM

## 2024-11-01 PROCEDURE — 72141 MRI NECK SPINE W/O DYE: CPT | Performed by: RADIOLOGY

## 2024-11-01 PROCEDURE — 70551 MRI BRAIN STEM W/O DYE: CPT | Performed by: RADIOLOGY

## 2024-11-26 ENCOUNTER — TELEPHONE (OUTPATIENT)
Dept: CARDIOLOGY | Facility: CLINIC | Age: 49
End: 2024-11-26
Payer: COMMERCIAL

## 2024-11-26 NOTE — TELEPHONE ENCOUNTER
Left Voicemail (1st Attempt) and Sent Mychart (1st Attempt) for the patient to call back and schedule the following:    Appointment type: [facility] CARDIOLOGY  Provider: BETSY  Return date: 03/10/2025  Specialty phone number: [phone] OPT 1  Additional appointment(s) needed: N/A  Additonal Notes: N/A

## 2024-12-11 ENCOUNTER — TELEPHONE (OUTPATIENT)
Dept: CARDIOLOGY | Facility: CLINIC | Age: 49
End: 2024-12-11
Payer: COMMERCIAL

## 2024-12-11 NOTE — TELEPHONE ENCOUNTER
Left Voicemail (2nd Attempt) for the patient to call back and schedule the following:    Appointment type: RT[facility] CARDIO  Provider: BETSY  Return date: 3/10/2025  Specialty phone number: [phone] OPT 1  Additional appointment(s) needed: N/A  Additonal Notes: N/A

## 2024-12-30 ENCOUNTER — INFUSION THERAPY VISIT (OUTPATIENT)
Dept: INFUSION THERAPY | Facility: CLINIC | Age: 49
End: 2024-12-30
Attending: PSYCHIATRY & NEUROLOGY
Payer: COMMERCIAL

## 2024-12-30 VITALS
TEMPERATURE: 98.2 F | OXYGEN SATURATION: 97 % | BODY MASS INDEX: 30.39 KG/M2 | DIASTOLIC BLOOD PRESSURE: 80 MMHG | HEART RATE: 84 BPM | RESPIRATION RATE: 16 BRPM | SYSTOLIC BLOOD PRESSURE: 121 MMHG | WEIGHT: 199.8 LBS

## 2024-12-30 DIAGNOSIS — G35 MULTIPLE SCLEROSIS (H): Primary | ICD-10-CM

## 2024-12-30 PROCEDURE — 258N000003 HC RX IP 258 OP 636: Performed by: PSYCHIATRY & NEUROLOGY

## 2024-12-30 PROCEDURE — 99207 PR NO CHARGE LOS: CPT

## 2024-12-30 PROCEDURE — 96375 TX/PRO/DX INJ NEW DRUG ADDON: CPT

## 2024-12-30 PROCEDURE — 250N000011 HC RX IP 250 OP 636: Performed by: PSYCHIATRY & NEUROLOGY

## 2024-12-30 PROCEDURE — 96366 THER/PROPH/DIAG IV INF ADDON: CPT

## 2024-12-30 PROCEDURE — 96365 THER/PROPH/DIAG IV INF INIT: CPT

## 2024-12-30 PROCEDURE — 250N000013 HC RX MED GY IP 250 OP 250 PS 637: Performed by: PSYCHIATRY & NEUROLOGY

## 2024-12-30 RX ORDER — METHYLPREDNISOLONE SODIUM SUCCINATE 125 MG/2ML
125 INJECTION INTRAMUSCULAR; INTRAVENOUS
Start: 2025-06-23

## 2024-12-30 RX ORDER — ACETAMINOPHEN 325 MG/1
650 TABLET ORAL ONCE
OUTPATIENT
Start: 2025-06-23

## 2024-12-30 RX ORDER — DIPHENHYDRAMINE HCL 25 MG
50 CAPSULE ORAL ONCE
OUTPATIENT
Start: 2025-06-23

## 2024-12-30 RX ORDER — DIPHENHYDRAMINE HCL 25 MG
50 CAPSULE ORAL ONCE
Status: COMPLETED | OUTPATIENT
Start: 2024-12-30 | End: 2024-12-30

## 2024-12-30 RX ORDER — EPINEPHRINE 1 MG/ML
0.3 INJECTION, SOLUTION INTRAMUSCULAR; SUBCUTANEOUS EVERY 5 MIN PRN
OUTPATIENT
Start: 2025-06-23

## 2024-12-30 RX ORDER — DIPHENHYDRAMINE HYDROCHLORIDE 50 MG/ML
50 INJECTION INTRAMUSCULAR; INTRAVENOUS
Start: 2025-06-23

## 2024-12-30 RX ORDER — METHYLPREDNISOLONE SODIUM SUCCINATE 125 MG/2ML
125 INJECTION INTRAMUSCULAR; INTRAVENOUS ONCE
Status: COMPLETED | OUTPATIENT
Start: 2024-12-30 | End: 2024-12-30

## 2024-12-30 RX ORDER — ACETAMINOPHEN 325 MG/1
650 TABLET ORAL ONCE
Status: COMPLETED | OUTPATIENT
Start: 2024-12-30 | End: 2024-12-30

## 2024-12-30 RX ORDER — MEPERIDINE HYDROCHLORIDE 25 MG/ML
25 INJECTION INTRAMUSCULAR; INTRAVENOUS; SUBCUTANEOUS EVERY 30 MIN PRN
OUTPATIENT
Start: 2025-06-23

## 2024-12-30 RX ORDER — ALBUTEROL SULFATE 90 UG/1
1-2 INHALANT RESPIRATORY (INHALATION)
Start: 2025-06-23

## 2024-12-30 RX ORDER — METHYLPREDNISOLONE SODIUM SUCCINATE 125 MG/2ML
125 INJECTION INTRAMUSCULAR; INTRAVENOUS ONCE
OUTPATIENT
Start: 2025-06-23

## 2024-12-30 RX ORDER — ALBUTEROL SULFATE 0.83 MG/ML
2.5 SOLUTION RESPIRATORY (INHALATION)
OUTPATIENT
Start: 2025-06-23

## 2024-12-30 RX ADMIN — OCRELIZUMAB 600 MG: 300 INJECTION INTRAVENOUS at 09:07

## 2024-12-30 RX ADMIN — ACETAMINOPHEN 650 MG: 325 TABLET ORAL at 08:59

## 2024-12-30 RX ADMIN — METHYLPREDNISOLONE SODIUM SUCCINATE 125 MG: 125 INJECTION, POWDER, FOR SOLUTION INTRAMUSCULAR; INTRAVENOUS at 09:04

## 2024-12-30 RX ADMIN — DIPHENHYDRAMINE HYDROCHLORIDE 50 MG: 25 CAPSULE ORAL at 09:00

## 2024-12-30 RX ADMIN — SODIUM CHLORIDE 100 ML: 9 INJECTION, SOLUTION INTRAVENOUS at 08:56

## 2024-12-30 NOTE — PROGRESS NOTES
"Infusion Nursing Note:  Chayo Nathan presents today for  Rapid Ocrevus.    Patient seen by provider today: No   present during visit today: Not Applicable.    Note: Patient reports feeling well overall.   Still experiences heart palpitations primarily with driving but they have since improved since starting on a beta blocker.     Feels MS symptoms are stable and reports \"I've been feeling great from this standpoint.\"     Rapid Rates today:     100ml x 15 min  20oml x 15 min  250ml x 30 min  300ml for the remainder    Intravenous Access:  Implanted Port.    Treatment Conditions:  Biological Infusion Checklist:  ~~~ NOTE: If the patient answers yes to any of the questions below, hold the infusion and contact ordering provider or on-call provider.    Have you recently had an elevated temperature, fever, chills, productive cough, coughing for 3 weeks or longer or hemoptysis,  abnormal vital signs, night sweats,  chest pain or have you noticed a decrease in your appetite, unexplained weight loss or fatigue? No  Do you have any open wounds or new incisions? No  Do you have any upcoming hospitalizations or surgeries? Does not include esophagogastroduodenoscopy, colonoscopy, endoscopic retrograde cholangiopancreatography (ERCP), endoscopic ultrasound (EUS), dental procedures or joint aspiration/steroid injections No  Do you currently have any signs of illness or infection or are you on any antibiotics? No  Have you had any new, sudden or worsening abdominal pain? No  Have you or anyone in your household received a live vaccination in the past 4 weeks? Please note: No live vaccines while on biologic/chemotherapy until 6 months after the last treatment. Patient can receive the flu vaccine (shot only), pneumovax and the Covid vaccine. It is optimal for the patient to get these vaccines mid cycle, but they can be given at any time as long as it is not on the day of the infusion. No  Have you recently been diagnosed " with any new nervous system diseases (ie. Multiple sclerosis or cancer diagnosis? Are you on any form of radiation or chemotherapy? No  Are you pregnant or breast feeding or do you have plans of pregnancy in the future? No  Have you been having any signs of worsening depression or suicidal ideations?  (benlysta only) N/A  Have there been any other new onset medical symptoms? No  Have you had any new blood clots? (IVIG only) N/A      Post Infusion Assessment:  Patient tolerated infusion without incident.  Patient observed for 60 minutes post Ocrevus per protocol.  Site patent and intact, free from redness, edema or discomfort.  No evidence of extravasations.  Access discontinued per protocol.  Biologic Infusion Post Education: Call the triage nurse at your clinic or seek medical attention if you have chills and/or temperature greater than or equal to 100.5, uncontrolled nausea/vomiting, diarrhea, constipation, dizziness, shortness of breath, chest pain, heart palpitations, weakness or any other new or concerning symptoms, questions or concerns.  You cannot have any live virus vaccines prior to or during treatment or up to 6 months post infusion.  If you have an upcoming surgery, medical procedure or dental procedure during treatment, this should be discussed with your ordering physician and your surgeon/dentist.  If you are having any concerning symptom, if you are unsure if you should get your next infusion or wish to speak to a provider before your next infusion, please call your care coordinator or triage nurse at your clinic to notify them so we can adequately serve you.       Discharge Plan:   Discharge instructions reviewed with: Patient.  Patient and/or family verbalized understanding of discharge instructions and all questions answered.  Patient discharged in stable condition accompanied by: self.  Departure Mode: Ambulatory.      Pauline Rendon RN

## 2025-01-01 ENCOUNTER — PATIENT OUTREACH (OUTPATIENT)
Dept: CARE COORDINATION | Facility: CLINIC | Age: 50
End: 2025-01-01
Payer: COMMERCIAL

## 2025-01-09 ENCOUNTER — OFFICE VISIT (OUTPATIENT)
Dept: NEUROLOGY | Facility: CLINIC | Age: 50
End: 2025-01-09
Attending: PSYCHIATRY & NEUROLOGY
Payer: COMMERCIAL

## 2025-01-09 VITALS
BODY MASS INDEX: 30.16 KG/M2 | DIASTOLIC BLOOD PRESSURE: 83 MMHG | HEART RATE: 80 BPM | SYSTOLIC BLOOD PRESSURE: 124 MMHG | OXYGEN SATURATION: 100 % | HEIGHT: 68 IN | WEIGHT: 199 LBS

## 2025-01-09 DIAGNOSIS — G35 MS (MULTIPLE SCLEROSIS) (H): Primary | ICD-10-CM

## 2025-01-09 PROCEDURE — 99213 OFFICE O/P EST LOW 20 MIN: CPT | Performed by: PSYCHIATRY & NEUROLOGY

## 2025-01-09 PROCEDURE — G2211 COMPLEX E/M VISIT ADD ON: HCPCS | Performed by: PSYCHIATRY & NEUROLOGY

## 2025-01-09 PROCEDURE — 99214 OFFICE O/P EST MOD 30 MIN: CPT | Performed by: PSYCHIATRY & NEUROLOGY

## 2025-01-09 ASSESSMENT — PAIN SCALES - GENERAL: PAINLEVEL_OUTOF10: NO PAIN (0)

## 2025-01-09 NOTE — PATIENT INSTRUCTIONS
Proceed with the next Ocrevus infusion on or about June 30, 2025    2.   Blood tests on date of next infusion    3.   Return to clinic in 6 months

## 2025-01-09 NOTE — NURSING NOTE
Chief Complaint   Patient presents with    MS    RECHECK     7 month follow up      Vitals were taken and medications were reconciled.   Paul Duncan, EMT  12:23 PM

## 2025-01-09 NOTE — LETTER
1/9/2025      RE: Chayo Nathan  [address] [facility] [address]     Referral source: Established patient    Chief complaint: Multiple sclerosis    History of the Present Illness: Ms. Chayo Nathan is a 49 year old right-handed woman who presents to the Multiple Sclerosis Clinic today for a scheduled follow up visit after earlier MRI scans of the brain and cervical spine.    The patient's history is as per my previous notes. Initial onset of symptoms of demyelinating disease was about 11 years ago when she noted a dysesthetic, burning sensation in the legs. Somewhat later, she had an episode likely representing optic neuritis, by history. Diagnosis of MS was formally confirmed in 2018 after investigations performed when she developed a new left foot drop. She was initially treated with glatiramer acetate, with therapy later being changed to natalizumab. 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 The last treatment was performed on 12/30/2024.    Today, she denies any new episodic changes in vision, balance, strength, or sensation suggestive of new relapse of multiple sclerosis since she was last seen in this clinic.    She is feeling better in several respects. Neuropathic discomfort in the left foot is less bothersome. Walking has also been improving. She is not dragging her left foot as much and she is able to walk at a faster rate. She thinks that this is partially attributable to substantial weight loss (approximately 70 pounds).    PHYSICAL EXAMINATION:  VITAL SIGNS: Blood pressure 124/83; pulese 80; weight 90.3 kg; height 1.73 m; oxygen saturation 100%.  GENERAL: Overweight woman who presents to the evaluation alone, awake and alert and in no acute distress.    Investigations: I reviewed images of MRI scan of the brain and cervical spine performed earlier on 11/01/2024, and the following is my independent interpretation of those images.    There  is no abnormal enhancement with gadolinium contrast throughout the brain and cord parenchyma. Short segment foci of T2 hyperintensity in the cervical cord are unchanged in comparison to previous MRI of 10/04/2023. Juxtacortical and infratentorial foci of T2 hyperintensity in the brain are similarly unchanged from 10/04/2023. No new lesions are seen.  Assessment/plan:    1. Multiple sclerosis  The patient is clinically and radiologically stable as regards any evidence of active inflammatory demyelination on current disease modifying therapy with ocrelizumab. She should proceed with the next treatment on or about June 30, 2025.    On the date of the infusion, I will check routine laboratory studies for monitoring of this medication, to include complete blood counts with differential, hepatic panel, and total antibody (IgG) level.    I would like to see her back in 6 months for a review.    The longitudinal plan of care for the diagnosis as documented was addressed during this visit. Due to the added complexity in care, I will continue to support the patient in subsequent management and with ongoing continuity of care.    Luiz Love MD   of Neurology  [facility]    Cc:  Shawna Garcia PA-C (PCP)  Patient

## 2025-01-09 NOTE — Clinical Note
1/9/2025       RE: Chayo Nathan  [address] [facility] [address]     Dear Colleague,    Thank you for referring your patient, Chayo Nathan, to the [facility] [place] [facility]. Please see a copy of my visit note below.    Referral source: Established patient    Chief complaint: Multiple sclerosis    History of the Present Illness: Ms. Chayo Nathan is a 49 year old right-handed woman who presents to the Multiple Sclerosis Clinic today for a scheduled follow up visit after earlier MRI scans of the brain and cervical spine.    The patient's history is as per my previous notes. Initial onset of symptoms of demyelinating disease was about 11 years ago when she noted a dysesthetic, burning sensation in the legs. Somewhat later, she had an episode likely representing optic neuritis, by history. Diagnosis of MS was formally confirmed in 2018 after investigations performed when she developed a new left foot drop. She was initially treated with glatiramer acetate, with therapy later being changed to natalizumab. 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 The last treatment was performed on 12/30/2024.    Today, she denies any new episodic changes in vision, balance, strength, or sensation suggestive of new relapse of multiple sclerosis since she was last seen in this clinic.    She is feeling better in several respects. Neuropathic discomfort in the left foot is less bothersome. Walking has also been improving. She is not dragging her left foot as much and she is able to walk at a faster rate. She thinks that this is partially attributable to substantial weight loss (approximately 70 pounds).    PHYSICAL EXAMINATION:  VITAL SIGNS: Blood pressure 124/83; pulese 80; weight 90.3 kg; height 1.73 m; oxygen saturation 100%.  GENERAL: Overweight woman who presents to the  evaluation alone, awake and alert and in no acute distress.    Investigations: I reviewed images of MRI scan of the brain and cervical spine performed earlier on 11/01/2024, and the following is my independent interpretation of those images.    There is no abnormal enhancement with gadolinium contrast throughout the brain and cord parenchyma. Short segment foci of T2 hyperintensity in the cervical cord are unchanged in comparison to previous MRI of 10/04/2023. Juxtacortical and infratentorial foci of T2 hyperintensity in the brain are similarly unchanged from 10/04/2023. No new lesions are seen.    Assessment/plan:    1. Multiple sclerosis  The patient is clinically and radiologically stable as regards any evidence of active inflammatory demyelination on current disease modifying therapy with ocrelizumab. She should proceed with the next treatment on or about June 30, 2025.    On the date of the infusion, I will check routine laboratory studies for monitoring of this medication, to include complete blood counts with differential, hepatic panel, and total antibody (IgG) level.    I would like to see her back in 6 months for a review.    The longitudinal plan of care for the diagnosis as documented was addressed during this visit. Due to the added complexity in care, I will continue to support the patient in subsequent management and with ongoing continuity of care.      Again, thank you for allowing me to participate in the care of your patient.      Sincerely,    Luiz Love MD

## 2025-01-12 NOTE — PROGRESS NOTES
Referral source: Established patient    Chief complaint: Multiple sclerosis    History of the Present Illness: Ms. Chayo Nathan is a 49 year old right-handed woman who presents to the Multiple Sclerosis Clinic today for a scheduled follow up visit after earlier MRI scans of the brain and cervical spine.    The patient's history is as per my previous notes. Initial onset of symptoms of demyelinating disease was about 11 years ago when she noted a dysesthetic, burning sensation in the legs. Somewhat later, she had an episode likely representing optic neuritis, by history. Diagnosis of MS was formally confirmed in 2018 after investigations performed when she developed a new left foot drop. She was initially treated with glatiramer acetate, with therapy later being changed to natalizumab. 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 The last treatment was performed on 12/30/2024.    Today, she denies any new episodic changes in vision, balance, strength, or sensation suggestive of new relapse of multiple sclerosis since she was last seen in this clinic.    She is feeling better in several respects. Neuropathic discomfort in the left foot is less bothersome. Walking has also been improving. She is not dragging her left foot as much and she is able to walk at a faster rate. She thinks that this is partially attributable to substantial weight loss (approximately 70 pounds).    PHYSICAL EXAMINATION:  VITAL SIGNS: Blood pressure 124/83; pulese 80; weight 90.3 kg; height 1.73 m; oxygen saturation 100%.  GENERAL: Overweight woman who presents to the evaluation alone, awake and alert and in no acute distress.    Investigations: I reviewed images of MRI scan of the brain and cervical spine performed earlier on 11/01/2024, and the following is my independent interpretation of those images.    There is no abnormal enhancement with gadolinium contrast throughout the brain and cord  parenchyma. Short segment foci of T2 hyperintensity in the cervical cord are unchanged in comparison to previous MRI of 10/04/2023. Juxtacortical and infratentorial foci of T2 hyperintensity in the brain are similarly unchanged from 10/04/2023. No new lesions are seen.    Assessment/plan:    1. Multiple sclerosis  The patient is clinically and radiologically stable as regards any evidence of active inflammatory demyelination on current disease modifying therapy with ocrelizumab. She should proceed with the next treatment on or about June 30, 2025.    On the date of the infusion, I will check routine laboratory studies for monitoring of this medication, to include complete blood counts with differential, hepatic panel, and total antibody (IgG) level.    I would like to see her back in 6 months for a review.    The longitudinal plan of care for the diagnosis as documented was addressed during this visit. Due to the added complexity in care, I will continue to support the patient in subsequent management and with ongoing continuity of care.

## 2025-02-01 ENCOUNTER — MYC MEDICAL ADVICE (OUTPATIENT)
Dept: ENDOCRINOLOGY | Facility: CLINIC | Age: 50
End: 2025-02-01
Payer: COMMERCIAL

## 2025-02-01 DIAGNOSIS — E66.01 CLASS 2 SEVERE OBESITY WITH SERIOUS COMORBIDITY AND BODY MASS INDEX (BMI) OF 37.0 TO 37.9 IN ADULT, UNSPECIFIED OBESITY TYPE (H): Primary | ICD-10-CM

## 2025-02-01 DIAGNOSIS — E66.812 CLASS 2 SEVERE OBESITY WITH SERIOUS COMORBIDITY AND BODY MASS INDEX (BMI) OF 37.0 TO 37.9 IN ADULT, UNSPECIFIED OBESITY TYPE (H): Primary | ICD-10-CM

## 2025-02-03 ENCOUNTER — TELEPHONE (OUTPATIENT)
Dept: ENDOCRINOLOGY | Facility: CLINIC | Age: 50
End: 2025-02-03
Payer: COMMERCIAL

## 2025-02-03 RX ORDER — SEMAGLUTIDE 2.4 MG/.75ML
2.4 INJECTION, SOLUTION SUBCUTANEOUS WEEKLY
Qty: 3 ML | Refills: 0 | Status: SHIPPED | OUTPATIENT
Start: 2025-02-03

## 2025-02-03 NOTE — TELEPHONE ENCOUNTER
PRIOR AUTHORIZATION DENIED    Medication: WEGOVY 2.4 MG/0.75ML SC SOAJ  Insurance Company: [employer] - Phone [phone] Fax [phone]  Denial Date: 2/3/2025  Denial Reason(s):           Appeal Information:    Patient Notified: Submitting to Secondary Insurance

## 2025-02-03 NOTE — TELEPHONE ENCOUNTER
Prior Authorization Not Needed per Insurance    Medication: WEGOVY 2.4 MG/0.75ML SC SOAJ  Insurance Company: [employer] - Phone [phone] Fax [phone]  Expected CoPay: $    [id number] [address] [place] [address]  Pharmacy Notified:  Y  Patient Notified: JASON

## 2025-02-04 NOTE — TELEPHONE ENCOUNTER
Prior Authorization Approval    Medication: WEGOVY 2.4 MG/0.75ML SC SOAJ  Authorization Effective Date: 1/5/2025  Authorization Expiration Date: 2/4/2026  Approved Dose/Quantity:    Reference #: Key: [id number]   Insurance Company:  - Phone [phone] Fax [phone]  Expected CoPay: $    CoPay Card Available: No    Financial Assistance Needed:    Which Pharmacy is filling the prescription: [id number] [place] [address]  Pharmacy Notified: Y  Patient Notified: Y

## 2025-02-04 NOTE — TELEPHONE ENCOUNTER
Tried for Prior Auth threw CMM and Received             - Tried to call [facility] #[id number] - [place] ([phone]) twice and         just keep ringing until it hung up.    - Called  and started Prior Auth # [id number] awaiting Approved Jan 5, 25, threw Feb 4, 2026    - Tried to call [id number] - [place] ([phone]) Rang until           hung up

## 2025-02-19 ENCOUNTER — VIRTUAL VISIT (OUTPATIENT)
Dept: ENDOCRINOLOGY | Facility: CLINIC | Age: 50
End: 2025-02-19
Payer: COMMERCIAL

## 2025-02-19 VITALS — BODY MASS INDEX: 29.7 KG/M2 | WEIGHT: 196 LBS | HEIGHT: 68 IN

## 2025-02-19 DIAGNOSIS — E66.9 OBESITY WITH BODY MASS INDEX (BMI) OF 35.0 TO 39.9 WITHOUT COMORBIDITY: ICD-10-CM

## 2025-02-19 DIAGNOSIS — E66.01 CLASS 2 SEVERE OBESITY WITH SERIOUS COMORBIDITY AND BODY MASS INDEX (BMI) OF 37.0 TO 37.9 IN ADULT, UNSPECIFIED OBESITY TYPE (H): ICD-10-CM

## 2025-02-19 DIAGNOSIS — Z98.84 HISTORY OF GASTRIC BYPASS: Primary | ICD-10-CM

## 2025-02-19 DIAGNOSIS — E66.812 CLASS 2 SEVERE OBESITY WITH SERIOUS COMORBIDITY AND BODY MASS INDEX (BMI) OF 37.0 TO 37.9 IN ADULT, UNSPECIFIED OBESITY TYPE (H): ICD-10-CM

## 2025-02-19 PROCEDURE — 98004 SYNCH AUDIO-VIDEO EST SF 10: CPT

## 2025-02-19 PROCEDURE — G2211 COMPLEX E/M VISIT ADD ON: HCPCS | Mod: 95

## 2025-02-19 RX ORDER — SEMAGLUTIDE 2.4 MG/.75ML
2.4 INJECTION, SOLUTION SUBCUTANEOUS WEEKLY
Qty: 3 ML | Refills: 0 | Status: SHIPPED | OUTPATIENT
Start: 2025-02-19

## 2025-02-19 RX ORDER — TOPIRAMATE 25 MG/1
TABLET, FILM COATED ORAL
Qty: 120 TABLET | Refills: 1 | Status: SHIPPED | OUTPATIENT
Start: 2025-02-19

## 2025-02-19 ASSESSMENT — PAIN SCALES - GENERAL: PAINLEVEL_OUTOF10: NO PAIN (0)

## 2025-02-19 NOTE — LETTER
2025       RE: Chayo Nathan  [address] [place] [facility] [place] [address]     Dear Colleague,    Thank you for referring your patient, Chayo Nathan, to the [facility] [place] at [facility]. Please see a copy of my visit note below.      Return Medical Weight Management Note     Chayo Nathan  MRN:  [id number]  :  1975  VIOLET:  2025    Dear Shawna Garcia PA-C,    I had the pleasure of seeing your patient Chayo Nathan. She is a 50 year old female who I am continuing to see for treatment of obesity related to:        2024     2:39 PM   --   I have the following health issues associated with obesity Stress Incontinence   I have the following symptoms associated with obesity Knee Pain    Lower Extremity Swelling    Fatigue    Groin Rash       Assessment & Plan  Problem List Items Addressed This Visit       Class 2 severe obesity with serious comorbidity and body mass index (BMI) of 37.0 to 37.9 in adult, unspecified obesity type (H)    Relevant Medications    topiramate (TOPAMAX) 25 MG tablet    Semaglutide-Weight Management (WEGOVY) 2.4 MG/0.75ML pen    Other Relevant Orders    Hemoglobin A1c    Comprehensive metabolic panel    Lipid panel reflex to direct LDL Fasting    Parathyroid Hormone Intact    Vitamin B12    Vitamin A    Vitamin D Deficiency    CBC with Platelets and Reflex to Iron Studies    History of gastric bypass - Primary    Relevant Orders    Hemoglobin A1c    Comprehensive metabolic panel    Lipid panel reflex to direct LDL Fasting    Parathyroid Hormone Intact    Vitamin B12    Vitamin A    Vitamin D Deficiency    CBC with Platelets and Reflex to Iron Studies    Obesity with body mass index (BMI) of 35.0 to 39.9 without comorbidity    Relevant Medications    topiramate (TOPAMAX) 25 MG tablet    Semaglutide-Weight Management (WEGOVY) 2.4 MG/0.75ML pen      Plan  Continue wegovy and  "topiramate at current doses, refills sent   Goals we discussed today:   Getting outside with dogs as the weather warms up   Continuing to use rowing machine regularly   Continuing to prioritize protein    Labs ordered today: annual post op labs   Follow up with Vanessa in 5-6 months   Dietician appointment as needed   Keep up the excellent work!       INTERVAL HISTORY:  RYGB 2007   Patient with joint pain, fatigue. Additionally diagnosed with MS in 2018, managed by neurologist Dr. Love, is currently getting an infusion for this once every 6 months. Only current MS symptom she experiences is an occasional left foot drop. Additionally has dealt with iron deficiency anemia over the last several years, this has been determined to be in part due to history of rygb and also heavy menstrual cycles. Had a hysterectomy last year and hopes this has resolved these issues, also takes multivitamin with iron.      New MWM with me 2/2/24- had started phentermine 37.5mg 1 month prior through pcp. We started topiramate in addition.      Seen by me 4/16/24:   Has seen 18lbs weight loss over the last 2 months.  Reports things are going well, has seen some plateauing over the last few weeks.   Feels clothes are fitting better.     Discussed working in weight training to her week to help break through plateau.      Could consider adding contrave in the future.      Was unable to see RD, will schedule as needed moving forward.     Could consider wegovy in the future, Chayo reports her insurance requires her to first try qsymia and contrave.      Seen by me 7/17/24:   -had covid 3 weeks ago, feeling better from this   -in ED yesterday (7/16/24) due to palpitations, shortness of breath, right sided chest pain going up to right shoulder and right neck, dizziness, nausea. D-dimer negative, chest x ray negative. EKG positive for sinus tach (101 bpm)     -feeling better today, describes yesterday her heart was doing \"acrobatics,\" wonders " "if palpitations were triggered from covid.  Discussed a plan to discontinue phentermine out of an abundance of caution, however it is not clear if phentermine was at the root of the symptoms.     -plateau lately for the last few weeks      -interested in changing medications.   -started wegovy, stopped phentermine out of an abundance of caution     Last seen by me 10/24/24   6lbs weight loss since last visit   Approximately 50lbs weight loss since starting AOMs. Feeling excellent with weight loss, down several pant sizes      Feeling great overall with her weight loss.   Started lopressor due to persistent arrhythmias through cardiology.      Has dual insurance: [employer] as well as  coverage- anticipates  will continue to cover wegovy into 2025    -continued wegovy     Today in visit 2/19/25   13lbs weight loss since last visit - is thrilled with her weight loss, is down 58lbs since starting with program. Noticing that none of her clothes are fitting. \"I feel like my whole outlook on life is more positive.\":   She is relieved that  is covering her wegovy     Some increase in sugar cravings, discussed taking topiramate regularly. Also just recently increased wegovy to 2.4 so this should help further.     Is excited to go swimming more this summer, go on water slides (loves water slides).   Wt Readings from Last 5 Encounters:   02/19/25 88.9 kg (196 lb)   01/09/25 90.3 kg (199 lb)   12/30/24 90.6 kg (199 lb 12.8 oz)   10/24/24 94.8 kg (209 lb)   09/09/24 99.6 kg (219 lb 9.6 oz)       Anti-obesity medication history    Current:   Wegovy 2.4mg- 1 week into this dose, no major side effects.   Topiramate 50mg BID- sometimes not taking it due to forgetting to take it. Continues to help her with avoiding pop.     Past/Failed/contraindicated:         GERD symptoms: denies     Constipation/Diarrhea: occasional diarrhea, maybe single day of worsened diarrhea 2 days after injection, overall tolerable for her. "     Recent diet changes: no major changes, continues to prioritize protein. Also working on purposefully having a high protein food for breakfast.     Recent exercise/activity changes: has a nordic track rowing machine, using ifit faye through work, trying to use this to workout 3x a week.     Recent sleep changes: improved sleep lately, noting improvement in insomnia     Vitamins/Labs: due for annual post op labs, orders placed. Currently taking daily vitamin d 5000iu, b12 2500 mcg daily. Takes an MVI occasionally. Has taken iron in the past, not currently taking.     CURRENT WEIGHT:   196 lbs 0 oz    Initial Weight (lbs): 254 lbs  Last Visits Weight: 94.8 kg (209 lb)  Cumulative weight loss (lbs): 58  Weight Loss Percentage: 22.83%        2/19/2025     1:35 PM   Changes and Difficulties   I have made the following changes to my diet since my last visit: Still focusing on protein   With regards to my diet, I am still struggling with: Sugar cravings at times   I have made the following changes to my activity/exercise since my last visit: Rowing [employer]   With regards to my activity/exercise, I am still struggling with: Endurance             MEDICATIONS:   Current Outpatient Medications   Medication Sig Dispense Refill     Semaglutide-Weight Management (WEGOVY) 2.4 MG/0.75ML pen Inject 2.4 mg subcutaneously once a week. 3 mL 0     topiramate (TOPAMAX) 25 MG tablet Take 2 tablets (50 mg) by mouth every morning AND 2 tablets (50 mg) every evening. (total of 100mg each day). 120 tablet 1     ferrous sulfate (FE TABS) 325 (65 Fe) MG EC tablet Take 1 tablet (325 mg) by mouth daily 90 tablet 3     metoprolol tartrate (LOPRESSOR) 25 MG tablet Take 1 tablet (25 mg) by mouth 2 times daily. 180 tablet 3     multivitamin w/minerals (THERA-VIT-M) tablet Take 1 tablet by mouth daily       ocrelizumab (OCREVUS) 300 MG/10ML SOLN injection Every six months       ondansetron (ZOFRAN) 4 MG tablet Take one tablet every six hours for  "nausea during colonoscopy bowel prepping 3 tablet 0     Semaglutide-Weight Management (WEGOVY) 1.7 MG/0.75ML pen Inject 1.7 mg subcutaneously once a week. 3 mL 3     vitamin B-12 (CYANOCOBALAMIN) 2500 MCG sublingual tablet Take 2,500 mcg by mouth daily       vitamin D3 (CHOLECALCIFEROL) 75939 units (250 mcg) capsule Take one tablet every other day 15 capsule 11           2/19/2025     1:35 PM   Weight Loss Medication History Reviewed With Patient   Which weight loss medications are you currently taking on a regular basis? Topamax (topiramate)    Wegovy   Are you having any side effects from the weight loss medication that we have prescribed you? No                No data to display                  PHYSICAL EXAM:  Objective   Ht 1.727 m (5' 8\")   Wt 88.9 kg (196 lb)   LMP 08/28/2023 (Approximate)   BMI 29.80 kg/m      Vitals - Patient Reported  Pain Score: No Pain (0)      Vitals:  No vitals were obtained today due to virtual visit.    GENERAL: alert and no distress  EYES: Eyes grossly normal to inspection.  No discharge or erythema, or obvious scleral/conjunctival abnormalities.  RESP: No audible wheeze, cough, or visible cyanosis.    SKIN: Visible skin clear. No significant rash, abnormal pigmentation or lesions.  NEURO: Cranial nerves grossly intact.  Mentation and speech appropriate for age.  PSYCH: Appropriate affect, tone, and pace of words        Sincerely,    Vanessa Mcclendon PA-C      12 minutes spent by me on the date of the encounter doing chart review, history and exam, documentation and further activities per the note    The longitudinal plan of care for the diagnosis(es)/condition(s) as documented were addressed during this visit. Due to the added complexity in care, I will continue to support Chayo in the subsequent management and with ongoing continuity of care.       Again, thank you for allowing me to participate in the care of your patient.      Sincerely,    Vanessa Mcclendon PA-C    "

## 2025-02-19 NOTE — NURSING NOTE
Is the patient currently in the state of [place]? YES    Visit mode:VIDEO    If the visit is dropped, the patient can be reconnected by: VIDEO VISIT: Send to e-mail at: [email or web address]    Will anyone else be joining the visit? NO  (If patient encounters technical issues they should call [phone]    How would you like to obtain your AVS? MyChart    Are changes needed to the allergy or medication list? No    Are refills needed on medications prescribed by this physician? NO    Reason for visit: MABEL PEÑA

## 2025-02-19 NOTE — PATIENT INSTRUCTIONS
"Thank you for allowing us the privilege of caring for you. We hope we provided you with the excellent service you deserve.   Please let us know if there is anything else we can do for you so that we can be sure you are completely satisfied with your care experience.    To ensure the quality of our services you may be receiving a patient satisfaction survey from an independent patient satisfaction monitoring company.    The greatest compliment you can give is a \"Likely to Recommend\"    Your visit was with Vanessa Mcclendon PA-C today.    Instructions per today's visit:     Dionisio Nathan, it was great to visit with you today.  Here is a review of our visit.  If our clinic scheduler is not able to reach you please call [phone] to schedule your next appointments.       Plan  Continue wegovy and topiramate at current doses, refills sent   Goals we discussed today:   Getting outside with dogs as the weather warms up   Continuing to use rowing machine regularly   Continuing to prioritize protein    Labs ordered today: annual post op labs   Follow up with Vanessa in 5-6 months   Dietician appointment as needed   Keep up the excellent work!       Information about Video Visits with [employer]: video visit information  _________________________________________________________________________________________________________________________________________________________  If you are asked by your clinic team to have your blood pressure checked:  [facility] do offer several locations for blood pressure checks. Please follow the below link to schedule an appointment. Scheduling an appointment at the pharmacy for a blood pressure check is now preferred.    Appointment Plus ([email or web address])  _________________________________________________________________________________________________________________________________________________________  Important contact and scheduling information:  Please call " our contact center at [phone] to schedule your next appointments.  To find a lab location near you, please call [phone].  For any nursing questions or concerns call Hayley Zamora LPN at [phone] or Xiomy Neil RN at [phone]  Please call during clinic hours Monday through Friday 8:00a - 4:00p if you have questions or you can contact us via [employer] at anytime and we will reply during clinic hours.    Lab results will be communicated through My Chart or letter (if My Chart not used). Please call the clinic if you have not received communication after 1 week or if you have any questions.?  Clinic Fax: [phone]    _Interested in working with a health ?  Health coaches work with you to improve your overall health and wellbeing.  They look at the whole person, and may involve discussion of different areas of life, including, but not limited to the four pillars of health (sleep, exercise, nutrition, and stress management). Discuss with your care team if you would like to start working a health .  Health Coaching-3 Pack: Schedule by calling [phone]    $99 for three health coaching visits    Visits may be done in person or via phone    Coaching is a partnership between the  and the client; Coaches do not prescribe or diagnose    Coaching helps inspire the client to reach his/her personal goals   _________________________________________________________________________________________________________________________________________________________  __________  [facility] Get Moving Program  Our team of physical therapists is trained to help you understand and take control of your condition. They will perform a thorough evaluation to determine your ability for activity and develop a customized plan to fit your goals and physical ability.  Scheduling: Unsure if the Get Moving program is right for you? Discuss the program with your medical provider or diabetes  educator. You can also call us at [phone] to ask questions or schedule an appointment.   [facility] Get Moving Program  ____________________________________________________________________________________________________________________________________________________________________________        Thank lauri,   [facility] Comprehensive Weight Management Team

## 2025-02-19 NOTE — PROGRESS NOTES
Return Medical Weight Management Note     Chayo Nathan  MRN:  [id number]  :  1975  VIOLET:  2025    Dear Shawna Garcia PA-C,    I had the pleasure of seeing your patient Chayo Nathan. She is a 50 year old female who I am continuing to see for treatment of obesity related to:        2024     2:39 PM   --   I have the following health issues associated with obesity Stress Incontinence   I have the following symptoms associated with obesity Knee Pain    Lower Extremity Swelling    Fatigue    Groin Rash       Assessment & Plan   Problem List Items Addressed This Visit       Class 2 severe obesity with serious comorbidity and body mass index (BMI) of 37.0 to 37.9 in adult, unspecified obesity type (H)    Relevant Medications    topiramate (TOPAMAX) 25 MG tablet    Semaglutide-Weight Management (WEGOVY) 2.4 MG/0.75ML pen    Other Relevant Orders    Hemoglobin A1c    Comprehensive metabolic panel    Lipid panel reflex to direct LDL Fasting    Parathyroid Hormone Intact    Vitamin B12    Vitamin A    Vitamin D Deficiency    CBC with Platelets and Reflex to Iron Studies    History of gastric bypass - Primary    Relevant Orders    Hemoglobin A1c    Comprehensive metabolic panel    Lipid panel reflex to direct LDL Fasting    Parathyroid Hormone Intact    Vitamin B12    Vitamin A    Vitamin D Deficiency    CBC with Platelets and Reflex to Iron Studies    Obesity with body mass index (BMI) of 35.0 to 39.9 without comorbidity    Relevant Medications    topiramate (TOPAMAX) 25 MG tablet    Semaglutide-Weight Management (WEGOVY) 2.4 MG/0.75ML pen      Plan  Continue wegovy and topiramate at current doses, refills sent   Goals we discussed today:   Getting outside with dogs as the weather warms up   Continuing to use rowing machine regularly   Continuing to prioritize protein    Labs ordered today: annual post op labs   Follow up with Vanessa in 5-6 months   Dietician appointment as needed   Keep up the  "excellent work!       INTERVAL HISTORY:  RYGB 2007   Patient with joint pain, fatigue. Additionally diagnosed with MS in 2018, managed by neurologist Dr. Love, is currently getting an infusion for this once every 6 months. Only current MS symptom she experiences is an occasional left foot drop. Additionally has dealt with iron deficiency anemia over the last several years, this has been determined to be in part due to history of rygb and also heavy menstrual cycles. Had a hysterectomy last year and hopes this has resolved these issues, also takes multivitamin with iron.      New MWM with me 2/2/24- had started phentermine 37.5mg 1 month prior through pcp. We started topiramate in addition.      Seen by me 4/16/24:   Has seen 18lbs weight loss over the last 2 months.  Reports things are going well, has seen some plateauing over the last few weeks.   Feels clothes are fitting better.     Discussed working in weight training to her week to help break through plateau.      Could consider adding contrave in the future.      Was unable to see RD, will schedule as needed moving forward.     Could consider wegovy in the future, Chayo reports her insurance requires her to first try qsymia and contrave.      Seen by me 7/17/24:   -had covid 3 weeks ago, feeling better from this   -in ED yesterday (7/16/24) due to palpitations, shortness of breath, right sided chest pain going up to right shoulder and right neck, dizziness, nausea. D-dimer negative, chest x ray negative. EKG positive for sinus tach (101 bpm)     -feeling better today, describes yesterday her heart was doing \"acrobatics,\" wonders if palpitations were triggered from covid.  Discussed a plan to discontinue phentermine out of an abundance of caution, however it is not clear if phentermine was at the root of the symptoms.     -plateau lately for the last few weeks      -interested in changing medications.   -started wegovy, stopped phentermine out of an " "abundance of caution     Last seen by me 10/24/24   6lbs weight loss since last visit   Approximately 50lbs weight loss since starting AOMs. Feeling excellent with weight loss, down several pant sizes      Feeling great overall with her weight loss.   Started lopressor due to persistent arrhythmias through cardiology.      Has dual insurance: [employer] as well as  coverage- anticipates  will continue to cover wegovy into 2025    -continued wegovy     Today in visit 2/19/25   13lbs weight loss since last visit - is thrilled with her weight loss, is down 58lbs since starting with program. Noticing that none of her clothes are fitting. \"I feel like my whole outlook on life is more positive.\":   She is relieved that  is covering her wegovy     Some increase in sugar cravings, discussed taking topiramate regularly. Also just recently increased wegovy to 2.4 so this should help further.     Is excited to go swimming more this summer, go on water slides (loves water slides).   Wt Readings from Last 5 Encounters:   02/19/25 88.9 kg (196 lb)   01/09/25 90.3 kg (199 lb)   12/30/24 90.6 kg (199 lb 12.8 oz)   10/24/24 94.8 kg (209 lb)   09/09/24 99.6 kg (219 lb 9.6 oz)       Anti-obesity medication history    Current:   Wegovy 2.4mg- 1 week into this dose, no major side effects.   Topiramate 50mg BID- sometimes not taking it due to forgetting to take it. Continues to help her with avoiding pop.     Past/Failed/contraindicated:         GERD symptoms: denies     Constipation/Diarrhea: occasional diarrhea, maybe single day of worsened diarrhea 2 days after injection, overall tolerable for her.     Recent diet changes: no major changes, continues to prioritize protein. Also working on purposefully having a high protein food for breakfast.     Recent exercise/activity changes: has a nordic track rowing machine, using ifit faye through work, trying to use this to workout 3x a week.     Recent sleep changes: improved " sleep lately, noting improvement in insomnia     Vitamins/Labs: due for annual post op labs, orders placed. Currently taking daily vitamin d 5000iu, b12 2500 mcg daily. Takes an MVI occasionally. Has taken iron in the past, not currently taking.     CURRENT WEIGHT:   196 lbs 0 oz    Initial Weight (lbs): 254 lbs  Last Visits Weight: 94.8 kg (209 lb)  Cumulative weight loss (lbs): 58  Weight Loss Percentage: 22.83%        2/19/2025     1:35 PM   Changes and Difficulties   I have made the following changes to my diet since my last visit: Still focusing on protein   With regards to my diet, I am still struggling with: Sugar cravings at times   I have made the following changes to my activity/exercise since my last visit: Rowing machjne   With regards to my activity/exercise, I am still struggling with: Endurance             MEDICATIONS:   Current Outpatient Medications   Medication Sig Dispense Refill    Semaglutide-Weight Management (WEGOVY) 2.4 MG/0.75ML pen Inject 2.4 mg subcutaneously once a week. 3 mL 0    topiramate (TOPAMAX) 25 MG tablet Take 2 tablets (50 mg) by mouth every morning AND 2 tablets (50 mg) every evening. (total of 100mg each day). 120 tablet 1    ferrous sulfate (FE TABS) 325 (65 Fe) MG EC tablet Take 1 tablet (325 mg) by mouth daily 90 tablet 3    metoprolol tartrate (LOPRESSOR) 25 MG tablet Take 1 tablet (25 mg) by mouth 2 times daily. 180 tablet 3    multivitamin w/minerals (THERA-VIT-M) tablet Take 1 tablet by mouth daily      ocrelizumab (OCREVUS) 300 MG/10ML SOLN injection Every six months      ondansetron (ZOFRAN) 4 MG tablet Take one tablet every six hours for nausea during colonoscopy bowel prepping 3 tablet 0    Semaglutide-Weight Management (WEGOVY) 1.7 MG/0.75ML pen Inject 1.7 mg subcutaneously once a week. 3 mL 3    vitamin B-12 (CYANOCOBALAMIN) 2500 MCG sublingual tablet Take 2,500 mcg by mouth daily      vitamin D3 (CHOLECALCIFEROL) 31338 units (250 mcg) capsule Take one tablet  "every other day 15 capsule 11           2/19/2025     1:35 PM   Weight Loss Medication History Reviewed With Patient   Which weight loss medications are you currently taking on a regular basis? Topamax (topiramate)    Wegovy   Are you having any side effects from the weight loss medication that we have prescribed you? No                No data to display                  PHYSICAL EXAM:  Objective    Ht 1.727 m (5' 8\")   Wt 88.9 kg (196 lb)   LMP 08/28/2023 (Approximate)   BMI 29.80 kg/m      Vitals - Patient Reported  Pain Score: No Pain (0)      Vitals:  No vitals were obtained today due to virtual visit.    GENERAL: alert and no distress  EYES: Eyes grossly normal to inspection.  No discharge or erythema, or obvious scleral/conjunctival abnormalities.  RESP: No audible wheeze, cough, or visible cyanosis.    SKIN: Visible skin clear. No significant rash, abnormal pigmentation or lesions.  NEURO: Cranial nerves grossly intact.  Mentation and speech appropriate for age.  PSYCH: Appropriate affect, tone, and pace of words        Sincerely,    Vanessa Mcclendon PA-C      12 minutes spent by me on the date of the encounter doing chart review, history and exam, documentation and further activities per the note    The longitudinal plan of care for the diagnosis(es)/condition(s) as documented were addressed during this visit. Due to the added complexity in care, I will continue to support Chayo in the subsequent management and with ongoing continuity of care.   "

## 2025-03-06 ENCOUNTER — TELEPHONE (OUTPATIENT)
Dept: ENDOCRINOLOGY | Facility: CLINIC | Age: 50
End: 2025-03-06
Payer: COMMERCIAL

## 2025-03-06 NOTE — TELEPHONE ENCOUNTER
Left Voicemail (1st Attempt) for the patient to call back and schedule the following:    Appointment type: return   Provider: Vanessa ORTIZ   Return date:next available   Specialty phone number: [phone]   Additional appointment(s) needed:   Additonal Notes:

## 2025-04-20 ENCOUNTER — MYC REFILL (OUTPATIENT)
Dept: ENDOCRINOLOGY | Facility: CLINIC | Age: 50
End: 2025-04-20
Payer: COMMERCIAL

## 2025-04-20 DIAGNOSIS — E66.812 CLASS 2 SEVERE OBESITY WITH SERIOUS COMORBIDITY AND BODY MASS INDEX (BMI) OF 37.0 TO 37.9 IN ADULT, UNSPECIFIED OBESITY TYPE (H): ICD-10-CM

## 2025-04-20 DIAGNOSIS — E66.01 CLASS 2 SEVERE OBESITY WITH SERIOUS COMORBIDITY AND BODY MASS INDEX (BMI) OF 37.0 TO 37.9 IN ADULT, UNSPECIFIED OBESITY TYPE (H): ICD-10-CM

## 2025-04-21 RX ORDER — SEMAGLUTIDE 2.4 MG/.75ML
2.4 INJECTION, SOLUTION SUBCUTANEOUS WEEKLY
Qty: 3 ML | Refills: 0 | Status: SHIPPED | OUTPATIENT
Start: 2025-04-21

## 2025-05-29 DIAGNOSIS — E66.812 CLASS 2 SEVERE OBESITY WITH SERIOUS COMORBIDITY AND BODY MASS INDEX (BMI) OF 37.0 TO 37.9 IN ADULT, UNSPECIFIED OBESITY TYPE (H): ICD-10-CM

## 2025-05-29 DIAGNOSIS — E66.01 CLASS 2 SEVERE OBESITY WITH SERIOUS COMORBIDITY AND BODY MASS INDEX (BMI) OF 37.0 TO 37.9 IN ADULT, UNSPECIFIED OBESITY TYPE (H): ICD-10-CM

## 2025-05-29 RX ORDER — SEMAGLUTIDE 2.4 MG/.75ML
2.4 INJECTION, SOLUTION SUBCUTANEOUS WEEKLY
Qty: 3 ML | Refills: 2 | Status: SHIPPED | OUTPATIENT
Start: 2025-05-29

## 2025-05-29 NOTE — TELEPHONE ENCOUNTER
Last Written Prescription:  Semaglutide-Weight Management (WEGOVY) 2.4 MG/0.75ML pen 3 mL 0 4/21/2025     ----------------------  Last Visit Date: 2/19/25  Future Visit Date: 8/21/25  ----------------------    Care Everywhere updated and checked for outside labs      Refill decision: Medication refilled per  Medication Refill in Ambulatory Care  policy.   []  If no future appointment scheduled: Route to Clinic Coordinators to contact the pt for appointment.          Request from pharmacy:  Requested Prescriptions   Pending Prescriptions Disp Refills    Semaglutide-Weight Management (WEGOVY) 2.4 MG/0.75ML pen 3 mL 0     Sig: Inject 2.4 mg subcutaneously once a week.       GLP-1 Agonists Protocol Passed - 5/29/2025  3:34 PM        Passed - Medication is active on med list and the sig matches. RN to manually verify dose and sig if red X/fail.     If the protocol passes (green check), you do not need to verify med dose and sig.    A prescription matches if they are the same clinical intention.    For Example: once daily and every morning are the same.    The protocol can not identify upper and lower case letters as matching and will fail.     For Example: Take 1 tablet (50 mg) by mouth daily     TAKE 1 TABLET (50 MG) BY MOUTH DAILY    For all fails (red x), verify dose and sig.    If the refill does match what is on file, the RN can still proceed to approve the refill request.       If they do not match, route to the appropriate provider.             Passed - Has GFR on file in past 12 months and most recent value is normal        Passed - Recent (6 month) or future (90 days) visit with the authorizing provider's specialty (provided they have been seen in the past 9 months)     The patient must have completed an in-person or virtual visit within the past 6 months or has a future visit scheduled within the next 90 days with the authorizing provider s specialty.  Urgent care and e-visits do not quality as an office visit  for this protocol.          Passed - Medication indicated for associated diagnosis     Medication is associated with one or more of the following diagnoses:    Type 2 diabetes mellitus  Obesity          Passed - Patient is age 18 or older        Passed - No active pregnancy on record        Passed - No positive pregnancy test in past 12 months

## 2025-06-11 ENCOUNTER — TELEPHONE (OUTPATIENT)
Dept: CARDIOLOGY | Facility: CLINIC | Age: 50
End: 2025-06-11
Payer: COMMERCIAL

## 2025-06-11 NOTE — TELEPHONE ENCOUNTER
Patient Contacted for the patient to call back and schedule the following:    Appointment type: rtn cardio  Provider: BETSY  Return date: 9/8/2025  Specialty phone number: [phone] OPT 1  Additional appointment(s) needed: N/A  Additonal Notes: RESCHEDULING 7/28 APPT

## 2025-06-27 PROCEDURE — 82306 VITAMIN D 25 HYDROXY: CPT

## 2025-06-27 PROCEDURE — 82607 VITAMIN B-12: CPT

## 2025-06-27 PROCEDURE — 83970 ASSAY OF PARATHORMONE: CPT

## 2025-06-30 LAB
ANNOTATION COMMENT IMP: NORMAL
RETINYL PALMITATE SERPL-MCNC: <0.02 MG/L
VIT A SERPL-MCNC: 0.54 MG/L

## 2025-07-02 ENCOUNTER — RESULTS FOLLOW-UP (OUTPATIENT)
Dept: ENDOCRINOLOGY | Facility: CLINIC | Age: 50
End: 2025-07-02

## 2025-07-19 ENCOUNTER — MYC REFILL (OUTPATIENT)
Dept: ENDOCRINOLOGY | Facility: CLINIC | Age: 50
End: 2025-07-19
Payer: COMMERCIAL

## 2025-07-19 DIAGNOSIS — E66.01 CLASS 2 SEVERE OBESITY WITH SERIOUS COMORBIDITY AND BODY MASS INDEX (BMI) OF 37.0 TO 37.9 IN ADULT, UNSPECIFIED OBESITY TYPE (H): ICD-10-CM

## 2025-07-19 DIAGNOSIS — E66.812 CLASS 2 SEVERE OBESITY WITH SERIOUS COMORBIDITY AND BODY MASS INDEX (BMI) OF 37.0 TO 37.9 IN ADULT, UNSPECIFIED OBESITY TYPE (H): ICD-10-CM

## 2025-07-21 ENCOUNTER — OFFICE VISIT (OUTPATIENT)
Dept: NEUROLOGY | Facility: CLINIC | Age: 50
End: 2025-07-21
Payer: COMMERCIAL

## 2025-07-21 VITALS
SYSTOLIC BLOOD PRESSURE: 113 MMHG | WEIGHT: 195.6 LBS | BODY MASS INDEX: 29.74 KG/M2 | HEART RATE: 66 BPM | DIASTOLIC BLOOD PRESSURE: 78 MMHG

## 2025-07-21 DIAGNOSIS — G35 MULTIPLE SCLEROSIS (H): Primary | ICD-10-CM

## 2025-07-21 PROCEDURE — G2211 COMPLEX E/M VISIT ADD ON: HCPCS | Performed by: PSYCHIATRY & NEUROLOGY

## 2025-07-21 PROCEDURE — 3078F DIAST BP <80 MM HG: CPT | Performed by: PSYCHIATRY & NEUROLOGY

## 2025-07-21 PROCEDURE — 3074F SYST BP LT 130 MM HG: CPT | Performed by: PSYCHIATRY & NEUROLOGY

## 2025-07-21 PROCEDURE — 99213 OFFICE O/P EST LOW 20 MIN: CPT | Performed by: PSYCHIATRY & NEUROLOGY

## 2025-07-21 RX ORDER — SEMAGLUTIDE 2.4 MG/.75ML
2.4 INJECTION, SOLUTION SUBCUTANEOUS WEEKLY
Qty: 9 ML | Refills: 1 | Status: SHIPPED | OUTPATIENT
Start: 2025-07-21

## 2025-07-21 NOTE — LETTER
"7/21/2025      RE: Chayo Nathan  [address] [facility] [address]     Referral source: Established patient     Chief complaint: Multiple sclerosis     History of the Present Illness: Ms. Chayo Nathan is a 50 year old right-handed woman who returns to the Multiple Sclerosis Clinic today for a scheduled follow up visit regarding her diagnosis of multiple sclerosis.    The patient's history is as per my previous notes. Initial onset of symptoms of demyelinating disease was in 2011/12 when she noted a dysesthetic, burning sensation in the legs. Somewhat later, she had an episode likely representing optic neuritis, by history. Diagnosis of MS was formally confirmed in 2018 after investigations performed when she developed a new left foot drop. She was initially treated with glatiramer acetate, with therapy later being changed to natalizumab. 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 The last treatment was performed on 6/27/2025.    Today, she denies any new episodic changes in vision, balance, strength, or sensation suggestive of a relapse of multiple sclerosis since she was last seen in this clinic.     Symptomatically, subjectively her left side seems slightly weaker than the right, although this is not functionally limiting in any way. She also notes some neuropathic pain, principally in the ball of the left foot, that is most noticeable at night when she is trying to sleep.  She manages this by rolling of the ball of her foot with a tennis ball, and it is not bothersome enough that she would want medication.    PHYSICAL EXAMINATION:  VITAL SIGNS: Blood pressure 113/78; pulse 66\";weight 88.7 kg.  GENERAL: Well-nourished woman who presents to the examination alone, awake and alert and in no acute distress.    NEUROLOGIC EXAMINATION:  CRANIAL NERVES: Visual fields are full to confrontation.  Extraocular movements are intact with no internuclear " ophthalmoplegia.  Facial strength is normal.  Palate elevation and tongue protrusion are normal.  POWER: Strength is within normal limits in proximal and distal muscles in the upper and lower limbs throughout.  REFLEXES: Reflexes are symmetric and within normal limits in the arms and legs.  MOTOR/CEREBELLAR: There is no appendicular ataxia on finger-to-nose testing.  Rapid alternating movements are within normal limits in the hands and fingers.  There is no pronator drift in the arms.  GAIT: The patient is able to ambulate on a flat, level surface with no gross loss of postural stability, and able to walk on heels, toes and in tandem.    Investigations: I reviewed the results of blood tests performed on the date of her last ocrelizumab infusion.  Vitamin D level was 50 mcg/L.  Total antibody (IgG) level was normal at 851 mg/dL.  Electrolytes and liver function test were normal.  There is mild anemia with hemoglobin 10.5 g/dL and MCV 74, consistent with iron deficiency.  This is a stable finding that predates her treatment with ocrelizumab and is unrelated to the medication.    Assessment/plan:    1. Multiple sclerosis  The patient is clinically stable as regards any evidence of active inflammatory demyelination on current disease modifying therapy with ocrelizumab.  She is very pleased with her response to the medication, and will proceed with the next infusion on or about 12/27/2025.    I would like to see her back in 6 months for a review.  As long as she remains otherwise stable in the interim, next imaging is planned in approximately 18 months.    The longitudinal plan of care for the diagnosis as documented was addressed during this visit. Due to the added complexity in care, I will continue to support the patient in subsequent management and with ongoing continuity of care.    Luiz Love MD   of Neurology  [facility]     Cc:  Shawna  AGAPITO Garcia (PCP)  Patient

## 2025-07-21 NOTE — PROGRESS NOTES
"Referral source: Established patient     Chief complaint: Multiple sclerosis     History of the Present Illness: Ms. Chayo Nathan is a 50 year old right-handed woman who returns to the Multiple Sclerosis Clinic today for a scheduled follow up visit regarding her diagnosis of multiple sclerosis.    The patient's history is as per my previous notes. Initial onset of symptoms of demyelinating disease was in 2011/12 when she noted a dysesthetic, burning sensation in the legs. Somewhat later, she had an episode likely representing optic neuritis, by history. Diagnosis of MS was formally confirmed in 2018 after investigations performed when she developed a new left foot drop. She was initially treated with glatiramer acetate, with therapy later being changed to natalizumab. Most recently, she has been on ocrelizumab since October 2021 due to LLIIANE virus seroconversion. The last treatment was performed on 6/27/2025.    Today, she denies any new episodic changes in vision, balance, strength, or sensation suggestive of a relapse of multiple sclerosis since she was last seen in this clinic.     Symptomatically, subjectively her left side seems slightly weaker than the right, although this is not functionally limiting in any way. She also notes some neuropathic pain, principally in the ball of the left foot, that is most noticeable at night when she is trying to sleep.  She manages this by rolling of the ball of her foot with a tennis ball, and it is not bothersome enough that she would want medication.    PHYSICAL EXAMINATION:  VITAL SIGNS: Blood pressure 113/78; pulse 66\";weight 88.7 kg.  GENERAL: Well-nourished woman who presents to the examination alone, awake and alert and in no acute distress.    NEUROLOGIC EXAMINATION:  CRANIAL NERVES: Visual fields are full to confrontation.  Extraocular movements are intact with no internuclear ophthalmoplegia.  Facial strength is normal.  Palate elevation and tongue protrusion are " normal.  POWER: Strength is within normal limits in proximal and distal muscles in the upper and lower limbs throughout.  REFLEXES: Reflexes are symmetric and within normal limits in the arms and legs.  MOTOR/CEREBELLAR: There is no appendicular ataxia on finger-to-nose testing.  Rapid alternating movements are within normal limits in the hands and fingers.  There is no pronator drift in the arms.  GAIT: The patient is able to ambulate on a flat, level surface with no gross loss of postural stability, and able to walk on heels, toes and in tandem.    Investigations: I reviewed the results of blood tests performed on the date of her last ocrelizumab infusion.  Vitamin D level was 50 mcg/L.  Total antibody (IgG) level was normal at 851 mg/dL.  Electrolytes and liver function test were normal.  There is mild anemia with hemoglobin 10.5 g/dL and MCV 74, consistent with iron deficiency.  This is a stable finding that predates her treatment with ocrelizumab and is unrelated to the medication.    Assessment/plan:    1. Multiple sclerosis  The patient is clinically stable as regards any evidence of active inflammatory demyelination on current disease modifying therapy with ocrelizumab.  She is very pleased with her response to the medication, and will proceed with the next infusion on or about 12/27/2025.    I would like to see her back in 6 months for a review.  As long as she remains otherwise stable in the interim, next imaging is planned in approximately 18 months.    The longitudinal plan of care for the diagnosis as documented was addressed during this visit. Due to the added complexity in care, I will continue to support the patient in subsequent management and with ongoing continuity of care.

## 2025-07-21 NOTE — Clinical Note
"7/21/2025      Chayo Nathan  [address] [facility] [address]      Dear Colleague,    Thank you for referring your patient, Chayo Nathan, to the [facility] [place]. Please see a copy of my visit note below.    Referral source: Established patient     Chief complaint: Multiple sclerosis     History of the Present Illness: Ms. Chayo Nathna is a 50 year old right-handed woman who returns to the Multiple Sclerosis Clinic today for a scheduled follow up visit regarding her diagnosis of multiple sclerosis.    The patient's history is as per my previous notes. Initial onset of symptoms of demyelinating disease was in 2011/12 when she noted a dysesthetic, burning sensation in the legs. Somewhat later, she had an episode likely representing optic neuritis, by history. Diagnosis of MS was formally confirmed in 2018 after investigations performed when she developed a new left foot drop. She was initially treated with glatiramer acetate, with therapy later being changed to natalizumab. 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 The last treatment was performed on 6/27/2025.    Today, she denies any new episodic changes in vision, balance, strength, or sensation suggestive of a relapse of multiple sclerosis since she was last seen in this clinic.     Symptomatically, subjectively her left side seems slightly weaker than the right, although this is not functionally limiting in any way. She also notes some neuropathic pain, principally in the ball of the left foot, that is most noticeable at night when she is trying to sleep.  She manages this by rolling of the ball of her foot with a tennis ball, and it is not bothersome enough that she would want medication.    PHYSICAL EXAMINATION:  VITAL SIGNS: Blood pressure 113/78; pulse 66\";weight 88.7 kg.  GENERAL: Well-nourished woman who presents to the examination alone, awake and alert and in " no acute distress.    NEUROLOGIC EXAMINATION:  CRANIAL NERVES: Visual fields are full to confrontation.  Extraocular movements are intact with no internuclear ophthalmoplegia.  Facial strength is normal.  Palate elevation and tongue protrusion are normal.  POWER: Strength is within normal limits in proximal and distal muscles in the upper and lower limbs throughout.  REFLEXES: Reflexes are symmetric and within normal limits in the arms and legs.  MOTOR/CEREBELLAR: There is no appendicular ataxia on finger-to-nose testing.  Rapid alternating movements are within normal limits in the hands and fingers.  There is no pronator drift in the arms.  GAIT: The patient is able to ambulate on a flat, level surface with no gross loss of postural stability, and able to walk on heels, toes and in tandem.    Investigations: I reviewed the results of blood tests performed on the date of her last ocrelizumab infusion.  Vitamin D level was 50 mcg/L.  Total antibody (IgG) level was normal at 851 mg/dL.  Electrolytes and liver function test were normal.  There is mild anemia with hemoglobin 10.5 g/dL and MCV 74, consistent with iron deficiency.  This is a stable finding that predates her treatment with ocrelizumab and is unrelated to the medication.    Assessment/plan:    1. Multiple sclerosis  The patient is clinically stable as regards any evidence of active inflammatory demyelination on current disease modifying therapy with ocrelizumab.  She is very pleased with her response to the medication, and will proceed with the next infusion on or about 12/27/2025.    I would like to see her back in 6 months for a review.  As long as she remains otherwise stable in the interim, next imaging is planned in approximately 18 months.    The longitudinal plan of care for the diagnosis as documented was addressed during this visit. Due to the added complexity in care, I will continue to support the patient in subsequent management and with ongoing  continuity of care.      Again, thank you for allowing me to participate in the care of your patient.        Sincerely,        Luiz Love MD    Electronically signed

## 2025-07-21 NOTE — PATIENT INSTRUCTIONS
Proceed with next Ocrevus infusion on or about December 27, 2025    2.   Return to clinic in 6 months

## 2025-08-21 ENCOUNTER — VIRTUAL VISIT (OUTPATIENT)
Dept: ENDOCRINOLOGY | Facility: CLINIC | Age: 50
End: 2025-08-21
Payer: COMMERCIAL

## 2025-08-21 VITALS — WEIGHT: 196 LBS | BODY MASS INDEX: 29.7 KG/M2 | HEIGHT: 68 IN

## 2025-08-21 DIAGNOSIS — E66.9 OBESITY WITH BODY MASS INDEX (BMI) OF 35.0 TO 39.9 WITHOUT COMORBIDITY: ICD-10-CM

## 2025-08-21 DIAGNOSIS — Z98.84 HISTORY OF GASTRIC BYPASS: ICD-10-CM

## 2025-08-21 DIAGNOSIS — E61.1 IRON DEFICIENCY: ICD-10-CM

## 2025-08-21 DIAGNOSIS — E66.812 CLASS 2 SEVERE OBESITY WITH SERIOUS COMORBIDITY AND BODY MASS INDEX (BMI) OF 37.0 TO 37.9 IN ADULT, UNSPECIFIED OBESITY TYPE (H): Primary | ICD-10-CM

## 2025-08-21 DIAGNOSIS — E66.01 CLASS 2 SEVERE OBESITY WITH SERIOUS COMORBIDITY AND BODY MASS INDEX (BMI) OF 37.0 TO 37.9 IN ADULT, UNSPECIFIED OBESITY TYPE (H): Primary | ICD-10-CM

## 2025-08-21 RX ORDER — TOPIRAMATE SPINKLE 25 MG/1
75 CAPSULE ORAL 2 TIMES DAILY
COMMUNITY
End: 2025-08-21

## 2025-08-21 RX ORDER — TOPIRAMATE SPINKLE 25 MG/1
75 CAPSULE ORAL DAILY
Qty: 90 CAPSULE | Refills: 3 | Status: SHIPPED | OUTPATIENT
Start: 2025-08-21

## 2025-08-21 RX ORDER — FERROUS SULFATE 325(65) MG
325 TABLET, DELAYED RELEASE (ENTERIC COATED) ORAL DAILY
Qty: 90 TABLET | Refills: 3 | Status: SHIPPED | OUTPATIENT
Start: 2025-08-21 | End: 2026-08-21

## 2025-08-21 ASSESSMENT — PAIN SCALES - GENERAL: PAINLEVEL_OUTOF10: NO PAIN (0)

## 2025-08-27 ENCOUNTER — TELEPHONE (OUTPATIENT)
Dept: ENDOCRINOLOGY | Facility: CLINIC | Age: 50
End: 2025-08-27
Payer: COMMERCIAL

## (undated) DEVICE — PREP CHLORAPREP 26ML TINTED ORANGE  260815

## (undated) DEVICE — PAD CHUX UNDERPAD 23X24" 7136

## (undated) DEVICE — KIT ENDO FIRST STEP DISINFECTANT 200ML W/POUCH EP-4

## (undated) RX ORDER — ONDANSETRON 2 MG/ML
INJECTION INTRAMUSCULAR; INTRAVENOUS
Status: DISPENSED
Start: 2024-04-05

## (undated) RX ORDER — LIDOCAINE HYDROCHLORIDE AND EPINEPHRINE 10; 10 MG/ML; UG/ML
INJECTION, SOLUTION INFILTRATION; PERINEURAL
Status: DISPENSED
Start: 2022-07-19

## (undated) RX ORDER — FENTANYL CITRATE 50 UG/ML
INJECTION, SOLUTION INTRAMUSCULAR; INTRAVENOUS
Status: DISPENSED
Start: 2024-04-05